# Patient Record
Sex: FEMALE | Race: WHITE | NOT HISPANIC OR LATINO | Employment: FULL TIME | URBAN - METROPOLITAN AREA
[De-identification: names, ages, dates, MRNs, and addresses within clinical notes are randomized per-mention and may not be internally consistent; named-entity substitution may affect disease eponyms.]

---

## 2017-08-08 ENCOUNTER — ALLSCRIPTS OFFICE VISIT (OUTPATIENT)
Dept: OTHER | Facility: OTHER | Age: 27
End: 2017-08-08

## 2017-08-08 DIAGNOSIS — Z86.19 PERSONAL HISTORY OF OTHER INFECTIOUS AND PARASITIC DISEASES: ICD-10-CM

## 2017-08-08 DIAGNOSIS — E78.00 PURE HYPERCHOLESTEROLEMIA: ICD-10-CM

## 2018-01-14 NOTE — PROGRESS NOTES
Assessment    1  Encounter for preventive health examination (V70 0) (Z00 00)   2  H/O cold sores (V12 09) (Z86 19)   3  Hypercholesterolemia (272 0) (E78 00)    Plan  H/O cold sores    · ValACYclovir HCl - 500 MG Oral Tablet; take 1 tablet daily prn   · (1) BASIC METABOLIC PROFILE; Status:Active; Requested for:18Pgx3912;    · (1) HEPATIC FUNCTION PANEL; Status:Active; Requested for:94Zzu9862;    · Follow-up PRN Evaluation and Treatment  Follow-up  Status: Complete  Done:  13OZA0281  Hypercholesterolemia    · (1) LIPID PANEL, FASTING; Status:Active; Requested for:81Zel0110;     Discussion/Summary    Reviewed labs  Results on line, call if any questions  The patient was counseled regarding instructions for management, risk factor reductions  Educational resources provided: None  Possible side effects of new medications were reviewed with the patient/guardian today  The treatment plan was reviewed with the patient/guardian  The patient/guardian understands and agrees with the treatment plan     Self Referrals: No      Chief Complaint  pt her today for yearly PE and Rx refill      History of Present Illness  HPI: Follow up  Using Inhalers infrequently, Valacyclovir when in the sun, summer, not much use in fall or winter  Review of Systems    Constitutional: No fever, no chills, feels well, no tiredness, no recent weight gain or weight loss  Eyes: No complaints of eye pain, no red eyes, no eyesight problems, no discharge, no dry eyes, no itching of eyes  ENT: no complaints of earache, no loss of hearing, no nose bleeds, no nasal discharge, no sore throat, no hoarseness  Cardiovascular: No complaints of slow heart rate, no fast heart rate, no chest pain, no palpitations, no leg claudication, no lower extremity edema  Respiratory: No complaints of shortness of breath, no wheezing, no cough, no SOB on exertion, no orthopnea, no PND     Gastrointestinal: No complaints of abdominal pain, no constipation, no nausea or vomiting, no diarrhea, no bloody stools  Genitourinary: No complaints of dysuria, no incontinence, no pelvic pain, no dysmenorrhea, no vaginal discharge or bleeding  Musculoskeletal: No complaints of arthralgias, no myalgias, no joint swelling or stiffness, no limb pain or swelling  Integumentary: No complaints of skin rash or lesions, no itching, no skin wounds, no breast pain or lump  Neurological: No complaints of headache, no confusion, no convulsions, no numbness, no dizziness or fainting, no tingling, no limb weakness, no difficulty walking  Psychiatric: Not suicidal, no sleep disturbance, no anxiety or depression, no change in personality, no emotional problems  Endocrine: No complaints of proptosis, no hot flashes, no muscle weakness, no deepening of the voice, no feelings of weakness  Hematologic/Lymphatic: No complaints of swollen glands, no swollen glands in the neck, does not bleed easily, does not bruise easily  Active Problems    1  Asthma (493 90) (J45 909)   2  Enlarged tonsils (474 11) (J35 1)   3  Exercise-induced asthma (493 81) (J45 990)   4  Fatigue (780 79) (R53 83)   5  H/O cold sores (V12 09) (Z86 19)   6  Herpes simplex type 1 infection (054 9) (B00 9)   7  Hypercholesterolemia (272 0) (E78 00)   8  Hypertension (401 9) (I10)   9  Iron deficiency anemia (280 9) (D50 9)   10  Pap test, as part of routine gynecological examination (V76 2) (Z01 419)   11  Screening for chlamydial disease (V73 98) (Z11 8)   12  Snores (786 09) (R06 83)   13   Vitamin D deficiency (268 9) (E55 9)    Past Medical History    · History of Asthma (493 90) (J45 909)   · History of hypercholesterolemia (V12 29) (Z86 39)    Surgical History    · History of Adenoidectomy   · History of Myringotomy   · History of Oral Surgery Tooth Extraction    Family History  Mother    · Family history of Hypertension (V17 49)  Father    · Family history of No Significant Family History    Social History · Being A Social Drinker   · Caffeine Use   · Daily Coffee Consumption (1  Cups/Day)   · Never A Smoker    Current Meds   1  Advair Diskus 100-50 MCG/DOSE Inhalation Aerosol Powder Breath Activated; INHALE   1 PUFFS Daily before exercise PRN; Therapy: 90QGW6496 to (Evaluate:07Jan2017)  Requested for: 50BTJ0749; Last   Rx:23Sgn5985 Ordered   2  ValACYclovir HCl - 500 MG Oral Tablet; TAKE 1 TABLET BY MOUTH EVERY DAY; Therapy: 37QRE3059 to (Evaluate:05Aug2017)  Requested for: 85KDC4173; Last   Rx:79Ebu0347 Ordered   3  Ventolin  (90 Base) MCG/ACT Inhalation Aerosol Solution; INHALE 1 TO 2   PUFFS EVERY 6 HOURS AS NEEDED; Therapy: 79BTM4526 to (Evaluate:07Jan2017)  Requested for: 15NUP9340; Last   Rx:62Nqg3779 Ordered   4  Zovia 1/35E (28) 1-35 MG-MCG Oral Tablet; TAKE 1 TABLET DAILY; Therapy: 16RKE1122 to Recorded   5  ZyrTEC Allergy 10 MG Oral Capsule; 1 tab daily prn; Therapy: 99RKB3358 to Recorded    Allergies    1  No Known Drug Allergies    2  Seasonal    Vitals   Recorded: 08Aug2017 09:38AM   Temperature 98 2 F, Oral   Heart Rate 80   Systolic 978, LUE   Diastolic 62, LUE   Height 5 ft 5 in   Weight 154 lb    BMI Calculated 25 63   BSA Calculated 1 77   O2 Saturation 98     Physical Exam    Constitutional   General appearance: No acute distress, well appearing and well nourished  Eyes   Conjunctiva and lids: No swelling, erythema or discharge  Pupils and irises: Equal, round and reactive to light  Ears, Nose, Mouth, and Throat   External inspection of ears and nose: Normal     Otoscopic examination: Tympanic membranes translucent with normal light reflex  Canals patent without erythema  Oropharynx: Normal with no erythema, edema, exudate or lesions  Pulmonary   Respiratory effort: No increased work of breathing or signs of respiratory distress  Auscultation of lungs: Clear to auscultation      Cardiovascular   Auscultation of heart: Normal rate and rhythm, normal S1 and S2, without murmurs  Examination of extremities for edema and/or varicosities: Normal     Lymphatic   Palpation of lymph nodes in neck: No lymphadenopathy  Musculoskeletal   Gait and station: Normal     Digits and nails: Normal without clubbing or cyanosis  Skin   Skin and subcutaneous tissue: Normal without rashes or lesions  Neurologic   Reflexes: 2+ and symmetric  Psychiatric   Orientation to person, place, and time: Normal     Mood and affect: Normal        Results/Data  PHQ-2 Adult Depression Screening 60Ebd0123 09:42AM User, Ahs     Test Name Result Flag Reference   PHQ-2 Adult Depression Score 0     Over the last two weeks, how often have you been bothered by any of the following problems? Little interest or pleasure in doing things: Not at all - 0  Feeling down, depressed, or hopeless: Not at all - 0   PHQ-2 Adult Depression Screening Negative         Health Management  Pap test, as part of routine gynecological examination   (every) THINPREP PAP; every 2 years; Last [id number]; Next Due: 95XPQ5521; Overdue  Screening for chlamydial disease   (1) CHLAMYDIA/GC AMPLIFIED DNA, PCR; every 1 year; Next Due: 41Ygg8724;   Overdue    Signatures   Electronically signed by : Agapito Horne DO; Aug  8 2017  1:13PM EST                       (Author)

## 2018-01-15 NOTE — MISCELLANEOUS
Message   Date: 08/15/2016 08:37 AM,   Patient called to say she is scheduled for 08/22/16 and had lab tests done 08/09/16, patient would like to know if any abnormal results you want to discuss or a need too see her again because otherwise, she can cancel appt  17 Aug 2016 12:01 PM   Called patient per Dr Tellez Genera instructions to tell her he said she may cancel her appt since no need to keep appointment  I let her know the lab results show that her cholesterol and triglycerides are a little high and gave her instructions to do moderate exercise, reduce saturated, trans fat, and cholesterol from diet and eat more fish high on Omega 3  Pt stated she understood and will try to change her diet but she does recall her cholesterol being high before  VR/vfp        Active Problems   1  Asthma (493 90) (J45 909)  2  Enlarged tonsils (474 11) (J35 1)  3  Exercise-induced asthma (493 81) (J45 990)  4  Fatigue (780 79) (R53 83)  5  H/O cold sores (V12 09) (Z86 19)  6  Herpes simplex type 1 infection (054 9) (B00 9)  7  Hypercholesterolemia (272 0) (E78 0)  8  Hypertension (401 9) (I10)  9  Iron deficiency anemia (280 9) (D50 9)  10  Pap test, as part of routine gynecological examination (V76 2) (Z01 419)  11  Screening for chlamydial disease (V73 98) (Z11 8)  12  Snores (786 09) (R06 83)  13  Vitamin D deficiency (268 9) (E55 9)    Current Meds  1  Advair Diskus 100-50 MCG/DOSE Inhalation Aerosol Powder Breath Activated; INHALE   1 PUFFS Daily before exercise PRN; Therapy: 56NXA4071 to (Evaluate:07Jan2017)  Requested for: 85DWD5461; Last   Rx:[id number] Ordered  2  ValACYclovir HCl - 500 MG Oral Tablet; TAKE 1 TABLET BY MOUTH EVERY DAY; Therapy: 31FOZ5303 to (Evaluate:07Jan2017)  Requested for: 13VKU0714; Last   Rx:87Kkb5930 Ordered  3  Ventolin  (90 Base) MCG/ACT Inhalation Aerosol Solution; INHALE 1 TO 2   PUFFS EVERY 6 HOURS AS NEEDED;    Therapy: 90YOI7502 to (Evaluate:07Jan2017)  Requested for: 23PLC1765; Last   Rx:02Ssn3550 Ordered  4  Zovia 1/35E (28) 1-35 MG-MCG Oral Tablet; TAKE 1 TABLET DAILY; Therapy: 48UVA4448 to Recorded  5  ZyrTEC Allergy 10 MG Oral Capsule; 1 tab daily prn; Therapy: 25DSQ7588 to Recorded    Allergies   1  No Known Drug Allergies   2   Seasonal    Signatures   Electronically signed by : Wilver Way DO; Aug 23 2016  9:34AM EST                       (Author)

## 2018-01-22 VITALS
DIASTOLIC BLOOD PRESSURE: 62 MMHG | HEART RATE: 80 BPM | OXYGEN SATURATION: 98 % | WEIGHT: 154 LBS | SYSTOLIC BLOOD PRESSURE: 114 MMHG | BODY MASS INDEX: 25.66 KG/M2 | HEIGHT: 65 IN | TEMPERATURE: 98.2 F

## 2018-07-10 ENCOUNTER — OFFICE VISIT (OUTPATIENT)
Dept: FAMILY MEDICINE CLINIC | Facility: CLINIC | Age: 28
End: 2018-07-10
Payer: COMMERCIAL

## 2018-07-10 VITALS
OXYGEN SATURATION: 99 % | HEIGHT: 65 IN | HEART RATE: 74 BPM | SYSTOLIC BLOOD PRESSURE: 106 MMHG | BODY MASS INDEX: 25.96 KG/M2 | WEIGHT: 155.8 LBS | TEMPERATURE: 98.6 F | DIASTOLIC BLOOD PRESSURE: 70 MMHG

## 2018-07-10 DIAGNOSIS — E78.00 HYPERCHOLESTEREMIA: ICD-10-CM

## 2018-07-10 DIAGNOSIS — Z86.19 HX OF COLD SORES: Primary | ICD-10-CM

## 2018-07-10 PROCEDURE — 3008F BODY MASS INDEX DOCD: CPT | Performed by: FAMILY MEDICINE

## 2018-07-10 PROCEDURE — 99213 OFFICE O/P EST LOW 20 MIN: CPT | Performed by: FAMILY MEDICINE

## 2018-07-10 RX ORDER — CEFUROXIME AXETIL 500 MG/1
500 TABLET ORAL 2 TIMES DAILY
Refills: 0 | COMMUNITY
Start: 2018-04-30 | End: 2019-10-09

## 2018-07-10 RX ORDER — SODIUM FLUORIDE 5 MG/G
GEL, DENTIFRICE DENTAL
COMMUNITY
Start: 2016-02-23 | End: 2019-10-09

## 2018-07-10 RX ORDER — VALACYCLOVIR HYDROCHLORIDE 500 MG/1
500 TABLET, FILM COATED ORAL DAILY PRN
Qty: 30 TABLET | Refills: 5 | Status: SHIPPED | OUTPATIENT
Start: 2018-07-10 | End: 2019-07-15 | Stop reason: SDUPTHER

## 2018-07-10 RX ORDER — ETHYNODIOL DIACETATE AND ETHINYL ESTRADIOL 1 MG-35MCG
1 KIT ORAL DAILY
COMMUNITY
Start: 2014-03-12

## 2018-07-10 RX ORDER — ALBUTEROL SULFATE 90 UG/1
2 AEROSOL, METERED RESPIRATORY (INHALATION) EVERY 6 HOURS PRN
COMMUNITY
Start: 2015-05-26 | End: 2020-05-06 | Stop reason: SDUPTHER

## 2018-07-10 RX ORDER — VALACYCLOVIR HYDROCHLORIDE 500 MG/1
1 TABLET, FILM COATED ORAL DAILY PRN
COMMUNITY
Start: 2015-05-26 | End: 2018-07-10 | Stop reason: SDUPTHER

## 2018-07-10 NOTE — PROGRESS NOTES
Assessment/Plan: patient here today for follow up for Rx refills     No problem-specific Assessment & Plan notes found for this encounter  Diagnoses and all orders for this visit:    Hx of cold sores  -     valACYclovir (VALTREX) 500 mg tablet; Take 1 tablet (500 mg total) by mouth daily as needed (as needed) for up to 30 days  -     Basic metabolic panel; Future  -     Hepatic function panel; Future    Hypercholesteremia  -     Lipid panel; Future    Other orders  -     Cetirizine HCl (ZYRTEC ALLERGY) 10 MG CAPS; Take 1 tablet by mouth daily as needed  -     ethynodiol-ethinyl estradiol (ZOVIA 1/35E, 28,) 1-35 MG-MCG per tablet; Take 1 tablet by mouth daily  -     albuterol (VENTOLIN HFA) 90 mcg/act inhaler; Inhale 2 puffs every 6 (six) hours as needed  -     Discontinue: valACYclovir (VALTREX) 500 mg tablet; Take 1 tablet by mouth daily as needed  -     SODIUM FLUORIDE, DENTAL GEL, (PREVIDENT) 1 1 % GEL;   -     cefuroxime (CEFTIN) 500 mg tablet; Take 500 mg by mouth 2 (two) times a day  -     fluticasone-salmeterol (ADVAIR DISKUS) 100-50 mcg/dose inhaler; Inhale Daily          Subjective:      Patient ID: Ness Dill is a 33 y o  female  Follow up  Using Valtrex maybe one 4 days a week during summer if out in sun  Doesn't use much during winter months        The following portions of the patient's history were reviewed and updated as appropriate: allergies, current medications, past family history, past medical history, past social history, past surgical history and problem list     Review of Systems   Constitutional: Negative  HENT: Negative  Eyes: Negative  Respiratory: Negative  Cardiovascular: Negative  Gastrointestinal: Negative  Genitourinary: Negative  Musculoskeletal: Negative  Skin: Negative  Neurological: Negative  Psychiatric/Behavioral: Negative            Objective:      /70 (BP Location: Left arm, Patient Position: Sitting, Cuff Size: Standard) Pulse 74   Temp 98 6 °F (37 °C) (Oral)   Ht 5' 5" (1 651 m)   Wt 70 7 kg (155 lb 12 8 oz)   LMP 06/20/2018   SpO2 99%   BMI 25 93 kg/m²          Physical Exam   Constitutional: She is oriented to person, place, and time  She appears well-developed and well-nourished  HENT:   Head: Normocephalic and atraumatic  Eyes: Conjunctivae are normal  Pupils are equal, round, and reactive to light  Cardiovascular: Normal rate, regular rhythm and normal heart sounds  Pulmonary/Chest: Effort normal and breath sounds normal    Neurological: She is alert and oriented to person, place, and time  She has normal reflexes  Skin: Skin is warm and dry  Psychiatric: She has a normal mood and affect   Her behavior is normal  Judgment and thought content normal

## 2019-07-12 DIAGNOSIS — Z86.19 HX OF COLD SORES: ICD-10-CM

## 2019-07-12 RX ORDER — VALACYCLOVIR HYDROCHLORIDE 500 MG/1
TABLET, FILM COATED ORAL
Qty: 30 TABLET | Refills: 5 | OUTPATIENT
Start: 2019-07-12

## 2019-07-15 DIAGNOSIS — Z86.19 HX OF COLD SORES: ICD-10-CM

## 2019-07-15 RX ORDER — VALACYCLOVIR HYDROCHLORIDE 500 MG/1
500 TABLET, FILM COATED ORAL DAILY PRN
Qty: 30 TABLET | Refills: 0 | Status: SHIPPED | OUTPATIENT
Start: 2019-07-15 | End: 2019-09-30 | Stop reason: SDUPTHER

## 2019-07-15 NOTE — TELEPHONE ENCOUNTER
Patient is overdue for follow up  Patient states she is leaving for vacation on 7/20/19 and will back to schedule yearly pe after vacation

## 2019-09-30 DIAGNOSIS — Z86.19 HX OF COLD SORES: ICD-10-CM

## 2019-09-30 RX ORDER — VALACYCLOVIR HYDROCHLORIDE 500 MG/1
500 TABLET, FILM COATED ORAL DAILY PRN
Qty: 7 TABLET | Refills: 0 | Status: SHIPPED | OUTPATIENT
Start: 2019-09-30 | End: 2019-10-09 | Stop reason: SDUPTHER

## 2019-09-30 NOTE — TELEPHONE ENCOUNTER
Pt celled for Rx refill, pt was told that last OV was 2018 pt scheduled for 10/9/2019 for follow up and will like Rx refills

## 2019-10-09 ENCOUNTER — OFFICE VISIT (OUTPATIENT)
Dept: FAMILY MEDICINE CLINIC | Facility: CLINIC | Age: 29
End: 2019-10-09
Payer: COMMERCIAL

## 2019-10-09 VITALS
BODY MASS INDEX: 27.66 KG/M2 | TEMPERATURE: 97.8 F | HEART RATE: 70 BPM | SYSTOLIC BLOOD PRESSURE: 106 MMHG | OXYGEN SATURATION: 98 % | WEIGHT: 166 LBS | DIASTOLIC BLOOD PRESSURE: 68 MMHG | HEIGHT: 65 IN

## 2019-10-09 DIAGNOSIS — R53.83 TIREDNESS: Primary | ICD-10-CM

## 2019-10-09 DIAGNOSIS — E55.9 VITAMIN D DEFICIENCY: ICD-10-CM

## 2019-10-09 DIAGNOSIS — Z86.19 HX OF COLD SORES: ICD-10-CM

## 2019-10-09 PROCEDURE — 99213 OFFICE O/P EST LOW 20 MIN: CPT | Performed by: FAMILY MEDICINE

## 2019-10-09 PROCEDURE — 3008F BODY MASS INDEX DOCD: CPT | Performed by: FAMILY MEDICINE

## 2019-10-09 RX ORDER — VALACYCLOVIR HYDROCHLORIDE 500 MG/1
500 TABLET, FILM COATED ORAL DAILY PRN
Qty: 30 TABLET | Refills: 5 | Status: SHIPPED | OUTPATIENT
Start: 2019-10-09 | End: 2021-01-27 | Stop reason: SDUPTHER

## 2019-10-09 NOTE — PROGRESS NOTES
Chief Complaint   Patient presents with    Follow-up     Rx refills     Assessment/Plan:  Discussed weight loss, foods to avoid  PHQ-9 Depression Screening    PHQ-9:    Frequency of the following problems over the past two weeks:       Little interest or pleasure in doing things:  0 - not at all  Feeling down, depressed, or hopeless:  0 - not at all  PHQ-2 Score:  0          Diagnoses and all orders for this visit:    Tiredness  -     Basic metabolic panel; Future  -     CBC and Platelet; Future  -     Hepatic function panel; Future  -     TSH, 3rd generation; Future    Hx of cold sores  -     valACYclovir (VALTREX) 500 mg tablet; Take 1 tablet (500 mg total) by mouth daily as needed (as needed)    Vitamin D deficiency  -     Vitamin D 25 hydroxy; Future          Subjective:      Patient ID: Emerita Marie is a 30 y o  female  Follow up refills  Had a nice summer  Tired a lot  Also on the go  Weight up 11 pounds past year  The following portions of the patient's history were reviewed and updated as appropriate: allergies, current medications, past medical history, past social history and problem list     Review of Systems   Constitutional:        Tiredness   HENT: Negative  Eyes: Negative  Respiratory: Negative  Cardiovascular: Negative  Gastrointestinal: Negative  Genitourinary: Negative  Musculoskeletal: Negative  Skin: Negative  Neurological: Negative  Psychiatric/Behavioral: Negative            Objective:      /68 (BP Location: Left arm, Patient Position: Sitting, Cuff Size: Standard)   Pulse 70   Temp 97 8 °F (36 6 °C) (Oral)   Ht 5' 5" (1 651 m)   Wt 75 3 kg (166 lb)   SpO2 98%   BMI 27 62 kg/m²      Current Outpatient Medications:     albuterol (VENTOLIN HFA) 90 mcg/act inhaler, Inhale 2 puffs every 6 (six) hours as needed, Disp: , Rfl:     Cetirizine HCl (ZYRTEC ALLERGY) 10 MG CAPS, Take 1 tablet by mouth daily as needed, Disp: , Rfl:    ethynodiol-ethinyl estradiol ([place] 1/35E, 28,) 1-35 MG-MCG per tablet, Take 1 tablet by mouth daily, Disp: , Rfl:     fluticasone-salmeterol (ADVAIR DISKUS) 100-50 mcg/dose inhaler, Inhale Daily, Disp: , Rfl:     valACYclovir (VALTREX) 500 mg tablet, Take 1 tablet (500 mg total) by mouth daily as needed (as needed) for up to 7 days, Disp: 7 tablet, Rfl: 0     Allergies   Allergen Reactions    Seasonal Ic  [Cholestatin]             Physical Exam   Constitutional: She is oriented to person, place, and time  She appears well-developed and well-nourished  HENT:   Head: Normocephalic and atraumatic  Right Ear: External ear normal    Left Ear: External ear normal    Nose: Nose normal    Mouth/Throat: Oropharynx is clear and moist    Eyes: Pupils are equal, round, and reactive to light  Conjunctivae and EOM are normal    Neck: Normal range of motion  Neck supple  Cardiovascular: Normal rate, regular rhythm, normal heart sounds and intact distal pulses  Pulmonary/Chest: Effort normal and breath sounds normal    Abdominal: Soft  Bowel sounds are normal    Neurological: She is alert and oriented to person, place, and time  She has normal reflexes  Skin: Skin is warm and dry  Psychiatric: She has a normal mood and affect   Her behavior is normal  Judgment and thought content normal

## 2019-10-09 NOTE — PROGRESS NOTES
BMI Counseling: Body mass index is 27 62 kg/m²  The BMI is above normal  Nutrition recommendations include reducing portion sizes, consuming healthier snacks, decreasing soda and/or juice intake, moderation in carbohydrate intake and increasing intake of lean protein

## 2019-10-21 ENCOUNTER — TELEPHONE (OUTPATIENT)
Dept: FAMILY MEDICINE CLINIC | Facility: CLINIC | Age: 29
End: 2019-10-21

## 2019-10-21 ENCOUNTER — APPOINTMENT (OUTPATIENT)
Dept: LAB | Age: 29
End: 2019-10-21
Payer: COMMERCIAL

## 2019-10-21 DIAGNOSIS — E78.00 HYPERCHOLESTEREMIA: Primary | ICD-10-CM

## 2019-10-21 DIAGNOSIS — E55.9 VITAMIN D DEFICIENCY: ICD-10-CM

## 2019-10-21 DIAGNOSIS — R53.83 TIREDNESS: ICD-10-CM

## 2019-10-21 DIAGNOSIS — E78.00 HYPERCHOLESTEREMIA: ICD-10-CM

## 2019-10-21 LAB
25(OH)D3 SERPL-MCNC: 25.9 NG/ML (ref 30–100)
ALBUMIN SERPL BCP-MCNC: 3.6 G/DL (ref 3.5–5)
ALP SERPL-CCNC: 53 U/L (ref 46–116)
ALT SERPL W P-5'-P-CCNC: 20 U/L (ref 12–78)
ANION GAP SERPL CALCULATED.3IONS-SCNC: 3 MMOL/L (ref 4–13)
AST SERPL W P-5'-P-CCNC: 14 U/L (ref 5–45)
BILIRUB DIRECT SERPL-MCNC: 0.11 MG/DL (ref 0–0.2)
BILIRUB SERPL-MCNC: 0.27 MG/DL (ref 0.2–1)
BUN SERPL-MCNC: 9 MG/DL (ref 5–25)
CALCIUM SERPL-MCNC: 9 MG/DL (ref 8.3–10.1)
CHLORIDE SERPL-SCNC: 108 MMOL/L (ref 100–108)
CHOLEST SERPL-MCNC: 221 MG/DL (ref 50–200)
CO2 SERPL-SCNC: 27 MMOL/L (ref 21–32)
CREAT SERPL-MCNC: 0.77 MG/DL (ref 0.6–1.3)
ERYTHROCYTE [DISTWIDTH] IN BLOOD BY AUTOMATED COUNT: 11.7 % (ref 11.6–15.1)
GFR SERPL CREATININE-BSD FRML MDRD: 105 ML/MIN/1.73SQ M
GLUCOSE P FAST SERPL-MCNC: 83 MG/DL (ref 65–99)
HCT VFR BLD AUTO: 40.2 % (ref 34.8–46.1)
HDLC SERPL-MCNC: 63 MG/DL
HGB BLD-MCNC: 13.6 G/DL (ref 11.5–15.4)
LDLC SERPL CALC-MCNC: 135 MG/DL (ref 0–100)
MCH RBC QN AUTO: 31.7 PG (ref 26.8–34.3)
MCHC RBC AUTO-ENTMCNC: 33.8 G/DL (ref 31.4–37.4)
MCV RBC AUTO: 94 FL (ref 82–98)
NONHDLC SERPL-MCNC: 158 MG/DL
PLATELET # BLD AUTO: 236 THOUSANDS/UL (ref 149–390)
PMV BLD AUTO: 11.3 FL (ref 8.9–12.7)
POTASSIUM SERPL-SCNC: 4.2 MMOL/L (ref 3.5–5.3)
PROT SERPL-MCNC: 7.7 G/DL (ref 6.4–8.2)
RBC # BLD AUTO: 4.29 MILLION/UL (ref 3.81–5.12)
SODIUM SERPL-SCNC: 138 MMOL/L (ref 136–145)
TRIGL SERPL-MCNC: 115 MG/DL
TSH SERPL DL<=0.05 MIU/L-ACNC: 1.58 UIU/ML (ref 0.36–3.74)
WBC # BLD AUTO: 7.03 THOUSAND/UL (ref 4.31–10.16)

## 2019-10-21 PROCEDURE — 80048 BASIC METABOLIC PNL TOTAL CA: CPT

## 2019-10-21 PROCEDURE — 82306 VITAMIN D 25 HYDROXY: CPT

## 2019-10-21 PROCEDURE — 85027 COMPLETE CBC AUTOMATED: CPT

## 2019-10-21 PROCEDURE — 80061 LIPID PANEL: CPT

## 2019-10-21 PROCEDURE — 84443 ASSAY THYROID STIM HORMONE: CPT

## 2019-10-21 PROCEDURE — 36415 COLL VENOUS BLD VENIPUNCTURE: CPT

## 2019-10-21 PROCEDURE — 80076 HEPATIC FUNCTION PANEL: CPT

## 2019-10-21 NOTE — TELEPHONE ENCOUNTER
Patient called and asked for lipid panel to be added to her blood work, she is at the lab and is fasting and would like to have that re-checked since she has history of high cholesterol

## 2020-05-06 DIAGNOSIS — J45.909 ASTHMA, UNSPECIFIED ASTHMA SEVERITY, UNSPECIFIED WHETHER COMPLICATED, UNSPECIFIED WHETHER PERSISTENT: Primary | ICD-10-CM

## 2020-05-06 RX ORDER — ALBUTEROL SULFATE 90 UG/1
1 AEROSOL, METERED RESPIRATORY (INHALATION) EVERY 6 HOURS PRN
Qty: 1 INHALER | Refills: 0 | Status: SHIPPED | OUTPATIENT
Start: 2020-05-06

## 2021-01-23 DIAGNOSIS — Z86.19 HX OF COLD SORES: ICD-10-CM

## 2021-01-25 RX ORDER — VALACYCLOVIR HYDROCHLORIDE 500 MG/1
TABLET, FILM COATED ORAL
Qty: 30 TABLET | Refills: 5 | OUTPATIENT
Start: 2021-01-25

## 2021-01-27 DIAGNOSIS — Z86.19 HX OF COLD SORES: ICD-10-CM

## 2021-01-27 RX ORDER — VALACYCLOVIR HYDROCHLORIDE 500 MG/1
500 TABLET, FILM COATED ORAL DAILY PRN
Qty: 30 TABLET | Refills: 0 | Status: SHIPPED | OUTPATIENT
Start: 2021-01-27 | End: 2021-07-13 | Stop reason: SDUPTHER

## 2021-03-31 DIAGNOSIS — Z23 ENCOUNTER FOR IMMUNIZATION: ICD-10-CM

## 2021-07-13 DIAGNOSIS — Z86.19 HX OF COLD SORES: ICD-10-CM

## 2021-07-13 RX ORDER — VALACYCLOVIR HYDROCHLORIDE 500 MG/1
500 TABLET, FILM COATED ORAL DAILY PRN
Qty: 10 TABLET | Refills: 0 | Status: SHIPPED | OUTPATIENT
Start: 2021-07-13 | End: 2021-07-23

## 2022-10-05 ENCOUNTER — TELEPHONE (OUTPATIENT)
Dept: OBGYN CLINIC | Facility: MEDICAL CENTER | Age: 32
End: 2022-10-05

## 2022-10-05 ENCOUNTER — APPOINTMENT (OUTPATIENT)
Dept: LAB | Facility: MEDICAL CENTER | Age: 32
End: 2022-10-05
Payer: COMMERCIAL

## 2022-10-05 DIAGNOSIS — Z32.01 POSITIVE PREGNANCY TEST: ICD-10-CM

## 2022-10-05 DIAGNOSIS — Z32.01 POSITIVE PREGNANCY TEST: Primary | ICD-10-CM

## 2022-10-05 LAB
ABO GROUP BLD: NORMAL
B-HCG SERPL-ACNC: ABNORMAL MIU/ML
BLD GP AB SCN SERPL QL: NEGATIVE
PROGEST SERPL-MCNC: 8.4 NG/ML
RH BLD: POSITIVE
SPECIMEN EXPIRATION DATE: NORMAL

## 2022-10-05 PROCEDURE — 86900 BLOOD TYPING SEROLOGIC ABO: CPT

## 2022-10-05 PROCEDURE — 84144 ASSAY OF PROGESTERONE: CPT

## 2022-10-05 PROCEDURE — 86901 BLOOD TYPING SEROLOGIC RH(D): CPT

## 2022-10-05 PROCEDURE — 86850 RBC ANTIBODY SCREEN: CPT

## 2022-10-05 PROCEDURE — 84702 CHORIONIC GONADOTROPIN TEST: CPT

## 2022-10-05 PROCEDURE — 36415 COLL VENOUS BLD VENIPUNCTURE: CPT

## 2022-10-05 NOTE — TELEPHONE ENCOUNTER
Pt called into office with positive pregnancy test, LMP was 8/16/22  Pt advised to have labs drawn at any time, pt agreed with treatment plan  Lab scripts added to patient's chart

## 2022-10-06 NOTE — TELEPHONE ENCOUNTER
Pt called in with concerns regarding test results  Pt would like to discuss results  Please call pt to follow up

## 2022-10-06 NOTE — TELEPHONE ENCOUNTER
Spoke with patient at this time  Advised her to schedule dating us   Pt requested earliest available apt

## 2022-10-11 PROBLEM — N91.2 AMENORRHEA: Status: ACTIVE | Noted: 2022-10-11

## 2022-10-11 NOTE — PROGRESS NOTES
Pregnancy Confirmation Visit  [facility] [address] [place] [address], [facility]    Assessment/Plan:  33 y o  No obstetric history on file  presenting with missed menses  No LMP recorded  EDC - 5/23/23     @ 8w2d   Sonogram EDC  5/19/23      Final EDC 5/23/22    by    LMP            - Continue/start prenatal vitamin  - We reviewed her current medications and discussed which are safe to continue in pregnancy  - We briefly discussed options for aneuploidy screening, to be discussed further at the prenatal intake  - Schedule prenatal intake with RN and initial prenatal visit; prenatal labs will be ordered during the prenatal intake      Subjective:    CC: Missed period    Madelin Omer is a 33 y o  No obstetric history on file  who presents with missed menses  No LMP recorded  Patient notes that this pregnancy was planned and desired  She was not using contraception at the time of conception  She reports she is certain of her LMP and that she has regular menses  She has has no vaginal bleeding since her LMP  Objective: There were no vitals taken for this visit      Physical Exam:  General: Well appearing, no distress  CV: Regular rate  Respiratory: Unlabored breathing  Abdomen: Soft, nontender  Extremities: Without edema  Mood and Affect: Appropriate    Transvaginal Pelvic Ultrasound  Jane IUP  Yolk sac: Present  Fetal Pole: Present  CRL consistent with EGA 5/26/22  Cardiac activity: Present   bpm  No adnexal masses appreciated

## 2022-10-12 ENCOUNTER — ULTRASOUND (OUTPATIENT)
Dept: OBGYN CLINIC | Facility: MEDICAL CENTER | Age: 32
End: 2022-10-12
Payer: COMMERCIAL

## 2022-10-12 VITALS
DIASTOLIC BLOOD PRESSURE: 68 MMHG | HEIGHT: 65 IN | BODY MASS INDEX: 27.32 KG/M2 | SYSTOLIC BLOOD PRESSURE: 108 MMHG | WEIGHT: 164 LBS

## 2022-10-12 DIAGNOSIS — N91.2 AMENORRHEA: Primary | ICD-10-CM

## 2022-10-12 PROCEDURE — 76817 TRANSVAGINAL US OBSTETRIC: CPT | Performed by: OBSTETRICS & GYNECOLOGY

## 2022-10-12 PROCEDURE — 99214 OFFICE O/P EST MOD 30 MIN: CPT | Performed by: OBSTETRICS & GYNECOLOGY

## 2022-10-12 RX ORDER — MULTIVIT WITH MINERALS/LUTEIN
TABLET ORAL
COMMUNITY
Start: 2022-08-10

## 2022-10-12 RX ORDER — LORATADINE 10 MG/1
10 TABLET ORAL 2 TIMES DAILY
COMMUNITY

## 2022-11-03 ENCOUNTER — INITIAL PRENATAL (OUTPATIENT)
Dept: OBGYN CLINIC | Facility: MEDICAL CENTER | Age: 32
End: 2022-11-03

## 2022-11-03 VITALS
HEIGHT: 65 IN | SYSTOLIC BLOOD PRESSURE: 118 MMHG | WEIGHT: 162 LBS | BODY MASS INDEX: 26.99 KG/M2 | DIASTOLIC BLOOD PRESSURE: 64 MMHG

## 2022-11-03 DIAGNOSIS — Z34.01 ENCOUNTER FOR SUPERVISION OF NORMAL FIRST PREGNANCY IN FIRST TRIMESTER: Primary | ICD-10-CM

## 2022-11-03 NOTE — PROGRESS NOTES
OB History    Para Term  AB Living   1             SAB IAB Ectopic Multiple Live Births                  # Outcome Date GA Lbr Norris/2nd Weight Sex Delivery Anes PTL Lv   1 Current                  * Pt presents for OB intake  *  *Pt's LMP was Patient's last menstrual period was 2022 (exact date)  *Ultrasound date:10/12/2022   8weeks 1days  *Estimated date of delivery: 2023   * confirmed by lmp    *Signs/Symptoms of Pregnancy   *no Constipation    *yes Headaches   *no Cramping  *no Spotting   Diabetes     *no Hx of GDM    *no BMI >35    *no First degree relative with type 2 diabetes     Hypertension-   *no Hx of chronic HTN   *Infection Screening   *no Does the pt have a hx of MRSA? *no History of herpes? *Immunizations:   *yes Discussed influenza vaccine Received:    *yes Discussed TDaP vaccine   *yes COVID Vaccine  Received   ACTIVE MEDICAL/MENTAL HEALTH CONDITIONS  Asthma: *yes- exercise induces   Depression *n   Anxiety*n  Medications*n  *Interview education   *Handouts given:    *[facility] Me support center     *MyChart sign up instructions    *Lab Locations    *[facility]ians List/Choosing Pediatrician Sheet    *[facility]    *Schedule for Prenatal Visits    *Pregnancy Warning Signs Reviewed    *Safe Medications During Pregnancy    *SMA and CF Testing information sheet    *CPT Code Sheet/MFM 13week NT pamphlet    *[employer]      SBIRT Screen:  Depression Screening Follow-up Plan: Patient's depression screening was negative with an [place] score of  4    *[facility]   *yes discussed genetic testing- pt interested   Patient has been informed of basic prenatal advice such as avoiding alcohol, drugs, and smoking  She should remain hydrated and take daily prenatal vitamins   Patient should avoid caffeine, raw sprouts, high mercury fish, undercooked fish, raw eggs, organ meat, unwashed produce, and unpasteurized cheeses, milk, and fruit juice and undercooked meats  She has been informed about toxoplasmosis and to avoid cat feces  *Details that I feel the provider should be aware of:  Tana Moseley was seen for her ob intake at 11w2d  Prenatal panel ordered  PN1 visit scheduled for *11/15/2022  The patient was oriented to our practice and all questions were answered      Interviewed by:  Matthias Urban

## 2022-11-14 PROBLEM — Z3A.13 13 WEEKS GESTATION OF PREGNANCY: Status: ACTIVE | Noted: 2022-10-11

## 2022-11-14 NOTE — PROGRESS NOTES
Prenatal H&P     Denies loss of fluid, vaginal discharge, vaginal bleeding  and abdominal pain  Not feeling fetal movement    Tolerating PNV, Claritin, B6 and Unisom well  Has not needed rescue inhaler  Planning to complete prenatal panel today  Plans for genetic screening appointment today  Planned  pregnancy  Patient has been getting daily headaches  Headaches are described as frontal takes Tylenol once today with good relief  Believes they are related to not drinking coffee  Patient states headaches will improve with caffeine use  Has an aversion to coffee but has been able to tolerate diet soda  OB history:     G1- current     Dating:     LMP - 8/16/22 JOSE 5/23/23     US on 10/12/22 @  8w 4d JOSE 5/20/23  Working JOSE 5/23/23     Surgical history:     Adenoidectomy, dental surgery and myringotomy    Medical History:     Asthma-exercise-induced, high cholesterol, COVID (8/14/2022), cold sores     Social history:     Alcohol/ tobacco/ illicit drug -no alcohol use since positive pregnancy test/denies tobacco or drug use     Denies history of STD/STI     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, family and friends supportive     PCP/ Dental- last PE 8/2022/ last cleaning 6 months ago      SBIRT-screen negative at intake    She denies a hx of recent cough, chills, fever,  STD/STI, denies a personal history  of TB or  Zika virus or close contacts with persons with TB or COVID -23  She denies a family history of inheritable conditions such as physical or intellectual disabilities, birth defects, blood disorders, heart or neural tube defects  She has never had MRSA  She denies recent travel or travel planned in the near future  Patient Plans   - Feeding breastfeeding  - Contraception uncertain  - Aware office delivers at [address] [place] [address]   If < 32 weeks will recommend evaluation at [address]:  - Continue prenatal vitamin daily  -  Genetic screening/ [facility] appointment today   - asthma in pregnancy has rescue inhaler  Encouraged to call office if using rescue inhaler more than 2-3 times per week  -daily headache encouraged good hydration, good eating hygiene  If no improvement can offer Reglan  -nausea/vomiting in pregnancy continue Unisom nightly and B6 12 5 mg 3 times a day  Encouraged small frequent meals  -  Weight gain in pregnancy-Who BMI recommend 15-25lb   Healthy diet and daily exercise reviewed and encouraged  - Unit regimen reviewed visit every 4 weeks until 28 weeks, every 2 weeks until 36 weeks and then weekly until delivery  - Labs - gonorrhea/chlamydia collected  Encouraged to complete prenatal panel  Pap smear UTD  - Vaccines in Pregnancy      - TDAP vaccine after 27 gestational weeks     - Reviewed with patient  Pregnancy with COVID infection is considered  high risk and can have poor outcome including  delivery, more severe infection  therefore  pregnant patients are recommended to get  COVID-19 vaccination  Written information provided  Had vaccine x 2 and booster  Planning to get 2nd booster in December     - influenza October- March had vaccine 22  -  Common discomforts of pregnancy and precautions reviewed  Signs and symptoms to report reviewed  Encouraged social distancing, good hand hygiene, masking, avoiding crowds and written information provided about COVID-19    RTO 4 weeks f/u US, HA & N/V

## 2022-11-14 NOTE — PATIENT INSTRUCTIONS
Medications and Pregnancy  The following list of over-the-counter medications is usually considered safe to take during pregnancy  Take care to not double up on products containing acetaminophen (Tylenol)  Colds/Sore Throat  Robitussin DM - Plain (guaifenesin)  Saline nasal spray  Warm salt water gargle  Cepacol throat lozenges or mouthwash (cetylpyridinium)  Sucrets (hexylresoricinol)  Allergy  AVOID the “D” - or DECONGESTANT  Claritin (loratadine)  Zrytec (cetirizine)  Allerga (fexofenadine)  Headaches / Aches and Pains  Tylenol (acetaminophen)  Do NOT exceed more than 3000 mg of Tylenol in a 24-hour period  Heartburn  Mylanta (aluminum hydroxide/simethicone, magnesium hydroxide)  Maalaox (aluminum magnesium hydroxide, magnesium hydroxide)  Tums (calcium carbonate)  Riopan (magaldrate)  Constipation  Colace (docusate sodium)  Surfak (docusate sodium)  MiraLAX  Glycerin suppositories  Fleets enema (sodium phosphate & sodium biphosphate)  Nausea/Vomiting  Vitamin B6 (pyridoxine) - May take 50 mg at bedtime, 25 mg in the morning, 25 mg in the afternoon  Unisom (doxylamine) - May use for nausea/vomiting - (cut a 25 mg tablet in half)  May cause drowsiness  Sleep  Benadryl (diphenhydramine) - Take 1-2 tablets as needed at bedtime  Unisom (doxylamine) 25 mg tablet - As needed at bedtime  Melatonin 5 mg tablet - As needed at bedtime    Generally the generic form of medicine is usually lower priced than the brand name form of the medicine  Talk to your healthcare provider before you take any medicines  Many medicines may harm your baby if you take them when you are pregnant  Do not take any medicines, vitamins, herbs, or supplements without first talking to your healthcare provider

## 2022-11-15 ENCOUNTER — ROUTINE PRENATAL (OUTPATIENT)
Dept: PERINATAL CARE | Facility: OTHER | Age: 32
End: 2022-11-15

## 2022-11-15 ENCOUNTER — INITIAL PRENATAL (OUTPATIENT)
Dept: OBGYN CLINIC | Facility: CLINIC | Age: 32
End: 2022-11-15

## 2022-11-15 VITALS — SYSTOLIC BLOOD PRESSURE: 118 MMHG | DIASTOLIC BLOOD PRESSURE: 72 MMHG | WEIGHT: 161.6 LBS | BODY MASS INDEX: 26.89 KG/M2

## 2022-11-15 VITALS
BODY MASS INDEX: 27.16 KG/M2 | HEIGHT: 65 IN | HEART RATE: 72 BPM | WEIGHT: 163 LBS | SYSTOLIC BLOOD PRESSURE: 112 MMHG | DIASTOLIC BLOOD PRESSURE: 76 MMHG

## 2022-11-15 DIAGNOSIS — Z11.3 ROUTINE SCREENING FOR STI (SEXUALLY TRANSMITTED INFECTION): ICD-10-CM

## 2022-11-15 DIAGNOSIS — Z13.79 GENETIC SCREENING: ICD-10-CM

## 2022-11-15 DIAGNOSIS — R51.9 PREGNANCY HEADACHE IN FIRST TRIMESTER: Primary | ICD-10-CM

## 2022-11-15 DIAGNOSIS — Z3A.13 13 WEEKS GESTATION OF PREGNANCY: ICD-10-CM

## 2022-11-15 DIAGNOSIS — O26.891 PREGNANCY HEADACHE IN FIRST TRIMESTER: Primary | ICD-10-CM

## 2022-11-15 DIAGNOSIS — Z3A.13 13 WEEKS GESTATION OF PREGNANCY: Primary | ICD-10-CM

## 2022-11-15 DIAGNOSIS — Z36.82 NUCHAL TRANSLUCENCY OF FETUS ON PRENATAL ULTRASOUND: ICD-10-CM

## 2022-11-15 DIAGNOSIS — Z71.85 VACCINE COUNSELING: ICD-10-CM

## 2022-11-15 DIAGNOSIS — J45.990 EXERCISE-INDUCED ASTHMA: ICD-10-CM

## 2022-11-15 DIAGNOSIS — O21.9 NAUSEA AND VOMITING IN PREGNANCY: ICD-10-CM

## 2022-11-15 RX ORDER — DIPHENHYDRAMINE HYDROCHLORIDE 25 MG/1
25 CAPSULE ORAL DAILY
COMMUNITY

## 2022-11-15 NOTE — PROGRESS NOTES
Patient chose to have [facility] Non-invasive Prenatal Screen with fetal sex  Patient given brochure and is aware [facility] will contact patients insurance and coordinate coverage  Patient made aware she will need to respond to text message or e-mail from "[employer] within 2 business days or testing will be run through insurance  Patient informed text message will come from area code  "415"  Provided [employer] # [phone] and web site : [email or web address]  "[place] your test online" card with barcode and test tube ID provided to patient  Reviewed [facility]'s web site states 5-7 business days for results via their portal  [facility] states 7-10 business days for results to be in [facility] [address]  A [employer] message will be sent once we receive results  2 vials of blood drawn from right arm by Danae Wasserman  Patient tolerated blood draw without difficulty  Specimens labeled with patient identifiers (name, date of birth, specimen collection date), order and specimen was verified with patient, packed and sent via [employer]  Copy of lab order scanned to Epic media  Maternal Fetal Medicine will have results in approximately 7-10 business days and will call patient or notify via [address]  Patient aware viewing lab result online will reveal fetal sex if ordered  Patient verbalized understanding of all instructions and no questions at this time

## 2022-11-15 NOTE — LETTER
November 20, 2022     Marleen Zazueta MD  [address]    Patient: Yvette Clayton   YOB: 1990   Date of Visit: 11/15/2022       Dear Dr Sheri Brooks: Thank you for referring Yvette Clayton to me for evaluation  Below are my notes for this consultation  If you have questions, please do not hesitate to call me  I look forward to following your patient along with you  Sincerely,        Abrahan Emerson MD        CC: No Recipients  Abrahan Emerson MD  11/20/2022 11:44 AM  Sign when Signing Visit  A fetal ultrasound was completed  See Ob procedures in Epic for an interpretation and recommendations  Do not hesitate to contact us in [facility] if you have questions  Joannie Spatz Patti Bleacher MD, MSCE  Maternal Fetal Medicine      Jesus Harvey  11/15/2022 11:28 AM  Sign when Signing Visit  Patient chose to have [facility] Non-invasive Prenatal Screen with fetal sex  Patient given brochure and is aware [facility] will contact patients insurance and coordinate coverage  Patient made aware she will need to respond to text message or e-mail from [employer] within 2 business days or testing will be run through insurance  Patient informed text message will come from area code  "415"  Provided [employer] # [phone] and web site : [employer]  "[place] your test online" card with barcode and test tube ID provided to patient  Reviewed [facility]'s web site states 5-7 business days for results via their portal  [facility] states 7-10 business days for results to be in [address] [place] [address] [address]  A [employer] message will be sent once we receive results  2 vials of blood drawn from right arm by Jesus Harvey  Patient tolerated blood draw without difficulty  Specimens labeled with patient identifiers (name, date of birth, specimen collection date), order and specimen was verified with patient, packed and sent via [facility]  Copy of lab order scanned to Epic media      Maternal Fetal Medicine will have results in approximately 7-10 business days and will call patient or notify via [address]  Patient aware viewing lab result online will reveal fetal sex if ordered  Patient verbalized understanding of all instructions and no questions at this time

## 2022-11-15 NOTE — LETTER
November 20, 2022     Zenda Spatz, MD  [address]    Patient: Brittney Garcia   YOB: 1990   Date of Visit: 11/15/2022       Dear Dr Karina Haynes: Thank you for referring Brittney Garcia to me for evaluation  Below are my notes for this consultation  If you have questions, please do not hesitate to call me  I look forward to following your patient along with you  Sincerely,        Amy Beatty MD        CC: No Recipients  Amy Beatty MD  11/20/2022 11:44 AM  Sign when Signing Visit  A fetal ultrasound was completed  See Ob procedures in Epic for an interpretation and recommendations  Do not hesitate to contact us in [facility] if you have questions  Shikha Cardenas MD, MSCE  Maternal Fetal Medicine      Maryjo Ann  11/15/2022 11:28 AM  Sign when Signing Visit  Patient chose to have [facility] Non-invasive Prenatal Screen with fetal sex  Patient given brochure and is aware [facility] will contact patients insurance and coordinate coverage  Patient made aware she will need to respond to text message or e-mail from [employer] within 2 business days or testing will be run through insurance  Patient informed text message will come from area code  "415"  Provided [employer] # [phone] and web site : [email or web address]  "[place] your test online" card with barcode and test tube ID provided to patient  Reviewed [facility]'s web site states 5-7 business days for results via their portal  [facility] states 7-10 business days for results to be in [address] [place] [address] [address]  A [employer] message will be sent once we receive results  2 vials of blood drawn from right arm by Maryjo Ann  Patient tolerated blood draw without difficulty  Specimens labeled with patient identifiers (name, date of birth, specimen collection date), order and specimen was verified with patient, packed and sent via [employer]  Copy of lab order scanned to Epic media      Maternal Fetal Medicine will have results in approximately 7-10 business days and will call patient or notify via [address]  Patient aware viewing lab result online will reveal fetal sex if ordered  Patient verbalized understanding of all instructions and no questions at this time

## 2022-11-16 LAB
C TRACH DNA SPEC QL NAA+PROBE: NEGATIVE
N GONORRHOEA DNA SPEC QL NAA+PROBE: NEGATIVE

## 2022-11-20 PROBLEM — Z71.85 VACCINE COUNSELING: Status: ACTIVE | Noted: 2022-11-20

## 2022-11-20 NOTE — PROGRESS NOTES
A fetal ultrasound was completed  See Ob procedures in Epic for an interpretation and recommendations  Do not hesitate to contact us in [facility] if you have questions  Shikha Cardenas MD, [facility]  Maternal Fetal Medicine

## 2022-11-21 ENCOUNTER — TELEPHONE (OUTPATIENT)
Dept: PERINATAL CARE | Facility: OTHER | Age: 32
End: 2022-11-21

## 2022-11-21 NOTE — TELEPHONE ENCOUNTER
----- Message from Abrahan Emerson MD sent at 11/21/2022 10:07 AM EST -----  I have reviewed the patient's lab results which are normal   Please contact the patient to inform her of the normal results, unless Ms Paulo Pina has already reviewed the results using mychart      Abrahan Emerson

## 2022-11-21 NOTE — TELEPHONE ENCOUNTER
TC to patient  Reported NIPT results screen negative  Patient did not wish to be informed of fetal sex  Patient advised to expect communication about [facility]SAFP  Tana Gant verbalized understanding and denies any questions

## 2022-11-23 ENCOUNTER — LAB (OUTPATIENT)
Dept: LAB | Facility: CLINIC | Age: 32
End: 2022-11-23

## 2022-11-23 DIAGNOSIS — Z34.01 ENCOUNTER FOR SUPERVISION OF NORMAL FIRST PREGNANCY IN FIRST TRIMESTER: ICD-10-CM

## 2022-11-23 LAB
ABO GROUP BLD: NORMAL
AMORPH URATE CRY URNS QL MICRO: ABNORMAL
BACTERIA UR QL AUTO: ABNORMAL /HPF
BASOPHILS # BLD AUTO: 0.04 THOUSANDS/ÂΜL (ref 0–0.1)
BASOPHILS NFR BLD AUTO: 1 % (ref 0–1)
BILIRUB UR QL STRIP: NEGATIVE
BLD GP AB SCN SERPL QL: NEGATIVE
CLARITY UR: ABNORMAL
COLOR UR: YELLOW
EOSINOPHIL # BLD AUTO: 0.04 THOUSAND/ÂΜL (ref 0–0.61)
EOSINOPHIL NFR BLD AUTO: 1 % (ref 0–6)
ERYTHROCYTE [DISTWIDTH] IN BLOOD BY AUTOMATED COUNT: 12.5 % (ref 11.6–15.1)
GLUCOSE UR STRIP-MCNC: NEGATIVE MG/DL
HBV SURFACE AG SER QL: NORMAL
HCT VFR BLD AUTO: 34 % (ref 34.8–46.1)
HGB BLD-MCNC: 11.9 G/DL (ref 11.5–15.4)
HGB UR QL STRIP.AUTO: NEGATIVE
IMM GRANULOCYTES # BLD AUTO: 0.03 THOUSAND/UL (ref 0–0.2)
IMM GRANULOCYTES NFR BLD AUTO: 0 % (ref 0–2)
KETONES UR STRIP-MCNC: NEGATIVE MG/DL
LEUKOCYTE ESTERASE UR QL STRIP: NEGATIVE
LYMPHOCYTES # BLD AUTO: 2.29 THOUSANDS/ÂΜL (ref 0.6–4.47)
LYMPHOCYTES NFR BLD AUTO: 29 % (ref 14–44)
MCH RBC QN AUTO: 31.6 PG (ref 26.8–34.3)
MCHC RBC AUTO-ENTMCNC: 35 G/DL (ref 31.4–37.4)
MCV RBC AUTO: 90 FL (ref 82–98)
MONOCYTES # BLD AUTO: 0.43 THOUSAND/ÂΜL (ref 0.17–1.22)
MONOCYTES NFR BLD AUTO: 5 % (ref 4–12)
MUCOUS THREADS UR QL AUTO: ABNORMAL
NEUTROPHILS # BLD AUTO: 5.15 THOUSANDS/ÂΜL (ref 1.85–7.62)
NEUTS SEG NFR BLD AUTO: 64 % (ref 43–75)
NITRITE UR QL STRIP: NEGATIVE
NON-SQ EPI CELLS URNS QL MICRO: ABNORMAL /HPF
NRBC BLD AUTO-RTO: 0 /100 WBCS
PH UR STRIP.AUTO: 7.5 [PH]
PLATELET # BLD AUTO: 235 THOUSANDS/UL (ref 149–390)
PMV BLD AUTO: 10.7 FL (ref 8.9–12.7)
PROT UR STRIP-MCNC: ABNORMAL MG/DL
RBC # BLD AUTO: 3.76 MILLION/UL (ref 3.81–5.12)
RBC #/AREA URNS AUTO: ABNORMAL /HPF
RH BLD: POSITIVE
RUBV IGG SERPL IA-ACNC: 5.3 IU/ML
SP GR UR STRIP.AUTO: 1.01 (ref 1–1.03)
SPECIMEN EXPIRATION DATE: NORMAL
UROBILINOGEN UR STRIP-ACNC: <2 MG/DL
WBC # BLD AUTO: 7.98 THOUSAND/UL (ref 4.31–10.16)
WBC #/AREA URNS AUTO: ABNORMAL /HPF

## 2022-11-24 LAB
BACTERIA UR CULT: NORMAL
HIV 1+2 AB+HIV1 P24 AG SERPL QL IA: NORMAL
RPR SER QL: NORMAL

## 2022-11-25 ENCOUNTER — TELEPHONE (OUTPATIENT)
Dept: OBGYN CLINIC | Facility: MEDICAL CENTER | Age: 32
End: 2022-11-25

## 2022-11-25 NOTE — RESULT ENCOUNTER NOTE
Please inform patient I reviewed her labs   She is rubella non immune and will need MMR after delivery

## 2022-11-25 NOTE — TELEPHONE ENCOUNTER
----- Message from Ishan Alvarez MD sent at 11/25/2022  4:09 AM EST -----  Please inform patient I reviewed her labs   She is rubella non immune and will need MMR after delivery

## 2022-12-13 ENCOUNTER — ROUTINE PRENATAL (OUTPATIENT)
Dept: OBGYN CLINIC | Facility: MEDICAL CENTER | Age: 32
End: 2022-12-13

## 2022-12-13 VITALS — BODY MASS INDEX: 28.29 KG/M2 | WEIGHT: 170 LBS | SYSTOLIC BLOOD PRESSURE: 122 MMHG | DIASTOLIC BLOOD PRESSURE: 80 MMHG

## 2022-12-13 DIAGNOSIS — Z13.79 GENETIC TESTING: Primary | ICD-10-CM

## 2022-12-13 DIAGNOSIS — Z3A.17 17 WEEKS GESTATION OF PREGNANCY: ICD-10-CM

## 2022-12-13 NOTE — PROGRESS NOTES
Judah Crowley is a 33y o  year old  at 17w0d for routine prenatal visit    + FM, no vaginal bleeding, contractions, or LOF  Complaints: No   Most recent ultrasound and labs reviewed    Has level 2 US scheduled   Aware will need MMR postpartum  Order for AFP given   NIPT - normal female [place] ([facility]

## 2022-12-26 ENCOUNTER — HOSPITAL ENCOUNTER (OUTPATIENT)
Facility: HOSPITAL | Age: 32
End: 2022-12-26
Admitting: OBSTETRICS & GYNECOLOGY

## 2022-12-26 ENCOUNTER — NURSE TRIAGE (OUTPATIENT)
Dept: OTHER | Facility: OTHER | Age: 32
End: 2022-12-26

## 2022-12-26 ENCOUNTER — HOSPITAL ENCOUNTER (OUTPATIENT)
Facility: HOSPITAL | Age: 32
Discharge: HOME | End: 2022-12-26
Attending: OBSTETRICS & GYNECOLOGY | Admitting: OBSTETRICS & GYNECOLOGY

## 2022-12-26 VITALS — DIASTOLIC BLOOD PRESSURE: 73 MMHG | TEMPERATURE: 99 F | SYSTOLIC BLOOD PRESSURE: 126 MMHG | HEART RATE: 77 BPM

## 2022-12-26 DIAGNOSIS — Z3A.18 18 WEEKS GESTATION OF PREGNANCY: Primary | ICD-10-CM

## 2022-12-26 LAB
BACTERIA UR QL AUTO: NORMAL /HPF
BILIRUB UR QL STRIP: NEGATIVE
CLARITY UR: CLEAR
COLOR UR: ABNORMAL
GLUCOSE UR STRIP-MCNC: NEGATIVE MG/DL
HGB UR QL STRIP.AUTO: NEGATIVE
KETONES UR STRIP-MCNC: ABNORMAL MG/DL
LEUKOCYTE ESTERASE UR QL STRIP: ABNORMAL
NITRITE UR QL STRIP: NEGATIVE
NON-SQ EPI CELLS URNS QL MICRO: NORMAL /HPF
PH UR STRIP.AUTO: 5.5 [PH]
PROT UR STRIP-MCNC: NEGATIVE MG/DL
RBC #/AREA URNS AUTO: NORMAL /HPF
SP GR UR STRIP.AUTO: 1.01 (ref 1–1.03)
UROBILINOGEN UR QL STRIP.AUTO: 0.2 E.U./DL
WBC #/AREA URNS AUTO: NORMAL /HPF

## 2022-12-26 RX ORDER — NITROFURANTOIN 25; 75 MG/1; MG/1
100 CAPSULE ORAL 2 TIMES DAILY
Qty: 14 CAPSULE | Refills: 0 | Status: SHIPPED | OUTPATIENT
Start: 2022-12-26 | End: 2022-12-26

## 2022-12-26 RX ORDER — NITROFURANTOIN 25; 75 MG/1; MG/1
100 CAPSULE ORAL 2 TIMES DAILY
Qty: 14 CAPSULE | Refills: 0 | Status: SHIPPED | OUTPATIENT
Start: 2022-12-26 | End: 2023-01-02

## 2022-12-26 NOTE — TELEPHONE ENCOUNTER
Regardin wks pregnant - lower abdomen pain/feeling like passing out  ----- Message from [facility] sent at 2022  8:09 AM EST -----  "I am 19 weeks pregnant  I have been having shooting in my lower abdomen after I have been going to the bathroom for the last 4 or 5 days  It eventually subsides but it is lasting longger and longer  I have been frequently urinating   Last night I feel like a nerve was triggered and I felt like I was going to pass out "

## 2022-12-26 NOTE — PROGRESS NOTES
L&D Triage Note - OB/GYN  Breana Hutchins 33 y o  female MRN: [id number]  Unit/Bed#: L&D 831-35 Encounter: [id number]      ASSESSMENT:    Breana Hutchins is a 33 y o   at 18w6d presenting to triage with complaint of abdominal pain  R/o PTL negative, may be UTI vs round ligament  Treating empirically for UTI with nitrofurantoin, Patient discharged  PLAN:    1) R/o PTL  -SVE cl/th/high  -CL4  13cm  -Tones 145  2) Pelvic pain and back pain  -Pain consistent with UTI  -Treating with nitrofurantoin  -No ovarian cysts on TVUS  3) Continue routine prenatal care  4) Discharge from [facility] triage with  labor precautions    - Reviewed rupture of membranes, false vs true labor, decreased fetal movement, and vaginal bleeding   - Pt to call provider with any concerns and follow up at her next scheduled prenatal appointment    - Case discussed with Dr Marco Antonio Steen:    Breana Hutchins 33 y o   at 18w6d with an Estimated Date of Delivery: 23 presenting to triage with low pelvic pain and back pain for past 6-7 days  Patient has a history of ovarian cysts and was concerned that this may be the source of the pain      Her current obstetrical history is significant for HSV    Her past obstetrical history is significant for none    Contractions: back pain  Leakage of fluid: none  Vaginal Bleeding: none  Fetal movement: not yet    OBJECTIVE:    Vitals:    22 1619   BP: 126/73   Pulse: 77   Temp: 99 °F (37 2 °C)       ROS:  Constitutional: Negative  Respiratory: Negative  Cardiovascular: Negative    Gastrointestinal: Negative    General Physical Exam:  General: in no apparent distress  Cardiovascular: No murmurs  Lungs: non-labored breathing  Abdomen: abdomen is soft without significant tenderness, masses, organomegaly or guarding  Lower extremeties: nontender    Cervical Exam  Speculum: Cervical os is closed, absence of pooling, scant white discharge    SVE: cl/th/high  FHT:     Tones 145  TOCO:   Contraction Frequency (minutes): 0  Contraction Duration (seconds): 0  Contraction Quality: Not applicablequiet    Lab Results   Component Value Date    WBC 7 98 11/23/2022    HGB 11 9 11/23/2022    HCT 34 0 (L) 11/23/2022     11/23/2022     Lab Results   Component Value Date    K 4 2 10/21/2019     10/21/2019    CO2 27 10/21/2019    BUN 9 10/21/2019    CREATININE 0 77 10/21/2019    AST 14 10/21/2019    ALT 20 10/21/2019       KOH/WTMT:     Infection:   - few clue cells    - no hyphae   - no trichomonads present    Membrane status   - neg ferning   - neg nitrazene   - neg pooling     Urine Dip    - pending    Imaging:           TVUS   - Cervical length    - 4 13cm    - 4 14cm    - 4 72cm   -No ovarian cysts      Saniya Modi MD,  OBGYN PGY-1  12/26/2022 5:38 PM

## 2022-12-26 NOTE — TELEPHONE ENCOUNTER
Regardin Weeks pregnanat Believe I have an ovarian cyst pain has gotten more frequent and worst  ----- Message from Nini Paulson sent at 2022  2:58 PM EST -----  '' I'm 19 weeks pregnant believe I have an ovarian cyst, I'm having abdominal pain for about 6-7 days the pain is gotten more frequent and worst ''

## 2022-12-26 NOTE — TELEPHONE ENCOUNTER
Patient calling in stating that she has been having intermittent sharp pain in her suprapubic area that lasts for a few seconds after she urinates or has a BM  She states that she has no burning with passing urine but her urine has been more cloudy than usual and she is urinating more frequently than usual  Patient denies VB, discharge or cramping  She has not been able to feel any fetal movement so far this pregnancy  Encouraged patient to increase fluids, use tylenol and heating pad for pain if needed and if symptoms do not get better to be seen at an UC today  Patient verbalized understanding

## 2022-12-26 NOTE — TELEPHONE ENCOUNTER
Patient called in to report worsening intermittent abdominal pain occurring more frequently and lasting longer  Triaged earlier today and patient went to [facility]; negative for UTI  Using Tylenol and heating pad  Reached out to on call provider  Advised to go to [facility] for evaluation since this is patient's second call in today and pain is worsening  Patient advised and will be there in 15-20 minutes  Charge RN L&D AL advised    Reason for Disposition  • Pregnant or could be pregnant (i e , missed last menstrual period)    Answer Assessment - Initial Assessment Questions  1  LOCATION: "Where does it hurt?"       Pain in lower abdomen with increasing frequency of shooting abdominal pain and problem lifting her left leg increases pain in lower abdomen; pain is the worst after finish voiding or moving bowels and trying to stand up after; would subside in a few minutes, now lasting up to an hour  2  RADIATION: "Does the pain shoot anywhere else?" (e g , chest, back)      Denies  3  ONSET: "When did the pain begin?" (e g , minutes, hours or days ago)       12/20   4  SUDDEN: "Gradual or sudden onset?"      Gradual  5  PATTERN "Does the pain come and go, or is it constant?"     - If constant: "Is it getting better, staying the same, or worsening?"       (Note: Constant means the pain never goes away completely; most serious pain is constant and it progresses)      - If intermittent: "How long does it last?" "Do you have pain now?"      (Note: Intermittent means the pain goes away completely between bouts)      Intermittent; becoming more frequent is the last two days  6   SEVERITY: "How bad is the pain?"  (e g , Scale 1-10; mild, moderate, or severe)    - MILD (1-3): doesn't interfere with normal activities, abdomen soft and not tender to touch     - MODERATE (4-7): interferes with normal activities or awakens from sleep, tender to touch     - SEVERE (8-10): excruciating pain, doubled over, unable to do any normal activities       Moderate to severe  7  RECURRENT SYMPTOM: "Have you ever had this type of stomach pain before?" If Yes, ask: "When was the last time?" and "What happened that time?"       Reminds patient of occurrences with ovarian cysts in the past  8  CAUSE: "What do you think is causing the stomach pain?"      Not sure; ruled out UTI  9  RELIEVING/AGGRAVATING FACTORS: "What makes it better or worse?" (e g , movement, antacids, bowel movement)      Heating pad  10  OTHER SYMPTOMS: "Has there been any vomiting, diarrhea, constipation, or urine problems?"       Ovarian cysts with one rupture in past with hospitalization, and 2 episodes of with non treatment  11   PREGNANCY: "Is there any chance you are pregnant?" "When was your last menstrual period?"        19 weeks; JOSE is 5/23/2023    Protocols used: ABDOMINAL PAIN - [facility] - CHANI HERMAN

## 2022-12-26 NOTE — PROCEDURES
[employer], a  at 18w6d with an JOSE of 2023, by Last Menstrual Period, was seen at [facility] for the following procedure(s): $Procedure Type: US - Transvaginal]                   Ultrasound Other  Cervical Length: 4 13         Ultrasound Probe Disinfection    A transvaginal ultrasound was performed     Prior to use, disinfection was performed with High Level Disinfection Process (Trophon)        Kristi Haas MD  22  5:12 PM

## 2022-12-26 NOTE — TELEPHONE ENCOUNTER
Reason for Disposition  • Pain or burning with passing urine (urination)    Answer Assessment - Initial Assessment Questions  1  LOCATION: "Where does it hurt?"       Lower abd- suprapubic area     2  RADIATION: "Does the pain shoot anywhere else?" (e g , chest, back, shoulder)      No     3  ONSET: "When did the pain begin?" (e g , minutes, hours or days ago)       4-5 days ago     4  ONSET: "Gradual or sudden onset?"    Sudden     5  PATTERN "Does the pain come and go, or has it been constant since it started?"       Comes and goes     6  SEVERITY: "How bad is the pain?" "What does it keep you from doing?"  (e g , Scale 1-10; mild, moderate, or severe)    - MILD (1-3): doesn't interfere with normal activities, abdomen soft and not tender to touch     - MODERATE (4-7): interferes with normal activities or awakens from sleep, tender to touch     - SEVERE (8-10): excruciating pain, doubled over, unable to do any normal activities      Initially to stand up from urinating pain is 7/10 and then subsides after a few seconds     7  RECURRENT SYMPTOM: "Have you ever had this type of stomach pain before?" If Yes, ask: "When was the last time?" and "What happened that time?"       No not like this before     8  CAUSE: "What do you think is causing the stomach pain?"    Unsure     9  RELIEVING/AGGRAVATING FACTORS: "What makes it better or worse?" (e g , antacids, bowel movement, movement)      After BM or urinating gets worse     10  OTHER SYMPTOMS: "Has there been any vaginal bleeding, fever, vomiting, diarrhea, or urine problems?"       Loose stools, denies VB or leaking, no fever       11  JOSE: "What date are you expecting to deliver?"        *No Answer*    Protocols used: PREGNANCY - ABDOMINAL PAIN LESS THAN 20 WEEKS EGA-ADULT-

## 2022-12-27 ENCOUNTER — TELEPHONE (OUTPATIENT)
Dept: OBGYN CLINIC | Facility: MEDICAL CENTER | Age: 32
End: 2022-12-27

## 2022-12-27 NOTE — TELEPHONE ENCOUNTER
Called pt to see if issue had been resolved pt stated that she had already spoken to on call Md ans was doing better

## 2022-12-30 ENCOUNTER — APPOINTMENT (OUTPATIENT)
Dept: LAB | Facility: CLINIC | Age: 32
End: 2022-12-30

## 2023-01-01 LAB
2ND TRIMESTER 4 SCREEN SERPL-IMP: NORMAL
AFP ADJ MOM SERPL: 1
AFP INTERP AMN-IMP: NORMAL
AFP INTERP SERPL-IMP: NORMAL
AFP INTERP SERPL-IMP: NORMAL
AFP SERPL-MCNC: 55.5 NG/ML
AGE AT DELIVERY: 31.6 YR
GA METHOD: NORMAL
GA: 19.4 WEEKS
IDDM PATIENT QL: NO
MULTIPLE PREGNANCY: NO
NEURAL TUBE DEFECT RISK FETUS: NORMAL %

## 2023-01-04 ENCOUNTER — TELEPHONE (OUTPATIENT)
Dept: OBGYN CLINIC | Facility: MEDICAL CENTER | Age: 33
End: 2023-01-04

## 2023-01-04 NOTE — TELEPHONE ENCOUNTER
Pt called in concerns with having a couple episodes since 12/25 pt stated feels like passing out and b/p might be dropping, though checked b/p and seemed okay  Pt stated same feeling happened again today at work and is in the nurse office for now and would like a call back to further discuss the episodes and any recommendation to help in the meantime   Please advise thank you

## 2023-01-05 PROBLEM — Z3A.20 20 WEEKS GESTATION OF PREGNANCY: Status: ACTIVE | Noted: 2022-10-11

## 2023-01-06 ENCOUNTER — APPOINTMENT (OUTPATIENT)
Dept: LAB | Facility: MEDICAL CENTER | Age: 33
End: 2023-01-06

## 2023-01-06 ENCOUNTER — ROUTINE PRENATAL (OUTPATIENT)
Dept: OBGYN CLINIC | Facility: MEDICAL CENTER | Age: 33
End: 2023-01-06

## 2023-01-06 ENCOUNTER — APPOINTMENT (OUTPATIENT)
Dept: URGENT CARE | Facility: MEDICAL CENTER | Age: 33
End: 2023-01-06

## 2023-01-06 VITALS
DIASTOLIC BLOOD PRESSURE: 60 MMHG | SYSTOLIC BLOOD PRESSURE: 110 MMHG | HEIGHT: 65 IN | BODY MASS INDEX: 28.49 KG/M2 | WEIGHT: 171 LBS

## 2023-01-06 DIAGNOSIS — R55 NEAR SYNCOPE: ICD-10-CM

## 2023-01-06 DIAGNOSIS — Z34.92 PRENATAL CARE IN SECOND TRIMESTER: Primary | ICD-10-CM

## 2023-01-06 DIAGNOSIS — Z3A.20 20 WEEKS GESTATION OF PREGNANCY: ICD-10-CM

## 2023-01-06 LAB
ATRIAL RATE: 73 BPM
BASOPHILS # BLD AUTO: 0.05 THOUSANDS/ÂΜL (ref 0–0.1)
BASOPHILS NFR BLD AUTO: 0 % (ref 0–1)
EOSINOPHIL # BLD AUTO: 0.07 THOUSAND/ÂΜL (ref 0–0.61)
EOSINOPHIL NFR BLD AUTO: 1 % (ref 0–6)
ERYTHROCYTE [DISTWIDTH] IN BLOOD BY AUTOMATED COUNT: 12.2 % (ref 11.6–15.1)
HCT VFR BLD AUTO: 31.5 % (ref 34.8–46.1)
HGB BLD-MCNC: 10.7 G/DL (ref 11.5–15.4)
IMM GRANULOCYTES # BLD AUTO: 0.05 THOUSAND/UL (ref 0–0.2)
IMM GRANULOCYTES NFR BLD AUTO: 0 % (ref 0–2)
LYMPHOCYTES # BLD AUTO: 2.51 THOUSANDS/ÂΜL (ref 0.6–4.47)
LYMPHOCYTES NFR BLD AUTO: 21 % (ref 14–44)
MCH RBC QN AUTO: 31.7 PG (ref 26.8–34.3)
MCHC RBC AUTO-ENTMCNC: 34 G/DL (ref 31.4–37.4)
MCV RBC AUTO: 93 FL (ref 82–98)
MONOCYTES # BLD AUTO: 0.53 THOUSAND/ÂΜL (ref 0.17–1.22)
MONOCYTES NFR BLD AUTO: 5 % (ref 4–12)
NEUTROPHILS # BLD AUTO: 8.53 THOUSANDS/ÂΜL (ref 1.85–7.62)
NEUTS SEG NFR BLD AUTO: 73 % (ref 43–75)
NRBC BLD AUTO-RTO: 0 /100 WBCS
P AXIS: -5 DEGREES
PLATELET # BLD AUTO: 241 THOUSANDS/UL (ref 149–390)
PMV BLD AUTO: 10.1 FL (ref 8.9–12.7)
PR INTERVAL: 164 MS
QRS AXIS: 36 DEGREES
QRSD INTERVAL: 92 MS
QT INTERVAL: 400 MS
QTC INTERVAL: 440 MS
RBC # BLD AUTO: 3.38 MILLION/UL (ref 3.81–5.12)
T WAVE AXIS: 18 DEGREES
VENTRICULAR RATE: 73 BPM
WBC # BLD AUTO: 11.74 THOUSAND/UL (ref 4.31–10.16)

## 2023-01-06 NOTE — PROGRESS NOTES
Problem OB visit -  denies loss of fluid, vaginal bleeding or abdominal pain  Has not felt fetal movement  Tolerating prenatal vitamin well  Patient is complaining of 3 isolated episodes of near syncope lasting 5 to 15 minutes  Episodes occurred on ,  &  Patient states it starts by feeling sweaty, foggy and then she starts dry heaving  Eats breakfast about 1 hour after waking has at least 2-3 snacks in between 3 meals a day  Denies feeling jittery, shaky, palpitations, dizziness, visual changes  Has had 2 vasovagal episodes in the past  at school while having a bowel movement and 2018 with ovarian cyst rupture admits to drinking a lot of water throughout the day  BP: 110/60 weight: +11lb  Plan  -Continue prenatal vitamins daily  -Near syncope-encouraged to complete EKG and blood work-TSH, free T4, CBC, CMP, ferritin  Encouraged to continue having a snack in between meals  Encouraged to keep hydrated however not to drink in excess of water can cause sodium deficiency   -Follow-up with  center scheduled for 23  -Common discomforts of pregnancy and precautions reviewed  Signs and symptoms to report reviewed    Written information provided about COVID-19  RTO as scheduled review labs/ EKG

## 2023-01-07 LAB
ALBUMIN SERPL BCP-MCNC: 3 G/DL (ref 3.5–5)
ALP SERPL-CCNC: 71 U/L (ref 46–116)
ALT SERPL W P-5'-P-CCNC: 20 U/L (ref 12–78)
ANION GAP SERPL CALCULATED.3IONS-SCNC: 8 MMOL/L (ref 4–13)
AST SERPL W P-5'-P-CCNC: 15 U/L (ref 5–45)
BILIRUB SERPL-MCNC: 0.19 MG/DL (ref 0.2–1)
BUN SERPL-MCNC: 7 MG/DL (ref 5–25)
CALCIUM ALBUM COR SERPL-MCNC: 9.6 MG/DL (ref 8.3–10.1)
CALCIUM SERPL-MCNC: 8.8 MG/DL (ref 8.3–10.1)
CHLORIDE SERPL-SCNC: 106 MMOL/L (ref 96–108)
CO2 SERPL-SCNC: 23 MMOL/L (ref 21–32)
CREAT SERPL-MCNC: 0.55 MG/DL (ref 0.6–1.3)
FERRITIN SERPL-MCNC: 40 NG/ML (ref 8–388)
GFR SERPL CREATININE-BSD FRML MDRD: 125 ML/MIN/1.73SQ M
GLUCOSE P FAST SERPL-MCNC: 111 MG/DL (ref 65–99)
POTASSIUM SERPL-SCNC: 3.6 MMOL/L (ref 3.5–5.3)
PROT SERPL-MCNC: 6.8 G/DL (ref 6.4–8.4)
SODIUM SERPL-SCNC: 137 MMOL/L (ref 135–147)
T4 FREE SERPL-MCNC: 0.99 NG/DL (ref 0.76–1.46)
TSH SERPL DL<=0.05 MIU/L-ACNC: 1.31 UIU/ML (ref 0.45–4.5)

## 2023-01-09 ENCOUNTER — ROUTINE PRENATAL (OUTPATIENT)
Dept: OBGYN CLINIC | Facility: MEDICAL CENTER | Age: 33
End: 2023-01-09

## 2023-01-09 VITALS — DIASTOLIC BLOOD PRESSURE: 68 MMHG | BODY MASS INDEX: 28.96 KG/M2 | WEIGHT: 174 LBS | SYSTOLIC BLOOD PRESSURE: 105 MMHG

## 2023-01-09 DIAGNOSIS — R94.31 ABNORMAL EKG: Primary | ICD-10-CM

## 2023-01-09 DIAGNOSIS — Z34.02 ENCOUNTER FOR SUPERVISION OF NORMAL FIRST PREGNANCY IN SECOND TRIMESTER: Primary | ICD-10-CM

## 2023-01-09 DIAGNOSIS — R94.31 ELECTROCARDIOGRAM SHOWING T WAVE ABNORMALITIES: ICD-10-CM

## 2023-01-09 DIAGNOSIS — Z3A.20 20 WEEKS GESTATION OF PREGNANCY: ICD-10-CM

## 2023-01-09 DIAGNOSIS — R55 NEAR SYNCOPE: ICD-10-CM

## 2023-01-09 NOTE — PROGRESS NOTES
Routine Prenatal Visit  [facility] [address] [place] [address]    Assessment/Plan:  Shell Kruse is a 33y o  year old  at 23w11d who presents for routine prenatal visit  1  Encounter for supervision of normal first pregnancy in second trimester    2  20 weeks gestation of pregnancy    3  Electrocardiogram showing T wave abnormalities          Subjective:     CC: Prenatal care    Seth Estevez is a 33 y o   female who presents for routine prenatal care at 23w11d  Pregnancy ROS: no leakage of fluid, pelvic pain, or vaginal bleeding  some fetal movement  Patient is follow up after recent visit with Leonor Swartz  Labs are normal, EKG reviewed with possible T wave abnormality  Will refer to cardiology  Has level 2 this week   The following portions of the patient's history were reviewed and updated as appropriate: allergies, current medications, past family history, past medical history, obstetric history, gynecologic history, past social history, past surgical history and problem list       Objective:  /68   Wt 78 9 kg (174 lb)   LMP 2022 (Exact Date)   BMI 28 96 kg/m²   Pregravid Weight/BMI: 72 6 kg (160 lb) (BMI 26 63)  Current Weight: 78 9 kg (174 lb)   Total Weight Gain: 6 35 kg (14 lb)   Pre-Leanne Vitals    Flowsheet Row Most Recent Value   Prenatal Assessment    Fetal Heart Rate 140   Movement Present   Prenatal Vitals    Blood Pressure 105/68   Weight - Scale 78 9 kg (174 lb)   Urine Albumin/Glucose    Dilation/Effacement/Station    Vaginal Drainage    Edema    LLE Edema None   RLE Edema None   Facial Edema None           General: Well appearing, no distress  Respiratory: Unlabored breathing  Cardiovascular: Regular rate  Abdomen: Soft, gravid, nontender, FH 20 5  Fundal Height: Appropriate for gestational age  Extremities: Warm and well perfused  Non tender

## 2023-01-10 ENCOUNTER — TELEPHONE (OUTPATIENT)
Dept: OBGYN CLINIC | Facility: MEDICAL CENTER | Age: 33
End: 2023-01-10

## 2023-01-10 NOTE — TELEPHONE ENCOUNTER
Pt scheduled with cardiology associates for 1/11/2023 at 3:40pm at the [place] location  Pt aware and agreeable   All office info provided

## 2023-01-11 NOTE — PATIENT INSTRUCTIONS
Thank you for choosing us for your  care today  If you have any questions about your ultrasound or care, please do not hesitate to contact us or your primary obstetrician  Some general instructions for your pregnancy are:    Exercise: Aim for 22 minutes per day (150 minutes per week) of regular exercise  Walking is great! Nutrition: Choose healthy sources of calcium, iron, and protein  Learn about Preeclampsia: preeclampsia is a common, serious high blood pressure complication in pregnancy  A blood pressure of 510PBAQ (systolic or top number) or 45LZFO (diastolic or bottom number) is not normal and needs evaluation by your doctor  Aspirin is sometimes prescribed in early pregnancy to prevent preeclampsia in women with risk factors - ask your obstetrician if you should be on this medication  For more resources, visit:  https://mothertobaby org/fact-sheets/low-dose-aspirin/  [employer] com cy  https://www highriskpregnancyinfo org/preeclampsia  If you smoke, try to reduce how many cigarettes you smoke or try to quit completely  Do not vape  Other warning signs to watch out for in pregnancy or postpartum: chest pain, obstructed breathing or shortness of breath, seizures, thoughts of hurting yourself or your baby, bleeding, a painful or swollen leg, fever, or headache (see [facility] POST-BIRTH Warning Signs campaign)  If these happen call 911  Itching is also not normal in pregnancy and if you experience this, especially over your hands and feet, potentially worse at night, notify your doctors

## 2023-01-12 ENCOUNTER — ROUTINE PRENATAL (OUTPATIENT)
Dept: PERINATAL CARE | Facility: CLINIC | Age: 33
End: 2023-01-12

## 2023-01-12 ENCOUNTER — CONSULT (OUTPATIENT)
Dept: CARDIOLOGY CLINIC | Facility: CLINIC | Age: 33
End: 2023-01-12

## 2023-01-12 VITALS
BODY MASS INDEX: 28.79 KG/M2 | DIASTOLIC BLOOD PRESSURE: 68 MMHG | HEART RATE: 93 BPM | SYSTOLIC BLOOD PRESSURE: 114 MMHG | WEIGHT: 172.8 LBS | HEIGHT: 65 IN

## 2023-01-12 VITALS
SYSTOLIC BLOOD PRESSURE: 110 MMHG | WEIGHT: 175 LBS | HEART RATE: 84 BPM | HEIGHT: 65 IN | BODY MASS INDEX: 29.16 KG/M2 | DIASTOLIC BLOOD PRESSURE: 74 MMHG

## 2023-01-12 DIAGNOSIS — Z36.86 ENCOUNTER FOR ANTENATAL SCREENING FOR CERVICAL LENGTH: ICD-10-CM

## 2023-01-12 DIAGNOSIS — R55 NEAR SYNCOPE: ICD-10-CM

## 2023-01-12 DIAGNOSIS — D50.9 IRON DEFICIENCY ANEMIA DURING PREGNANCY: ICD-10-CM

## 2023-01-12 DIAGNOSIS — Z3A.20 20 WEEKS GESTATION OF PREGNANCY: ICD-10-CM

## 2023-01-12 DIAGNOSIS — R94.31 ABNORMAL EKG: ICD-10-CM

## 2023-01-12 DIAGNOSIS — Z36.3 ENCOUNTER FOR ANTENATAL SCREENING FOR MALFORMATIONS: Primary | ICD-10-CM

## 2023-01-12 DIAGNOSIS — O99.019 IRON DEFICIENCY ANEMIA DURING PREGNANCY: ICD-10-CM

## 2023-01-12 RX ORDER — FERROUS SULFATE TAB EC 324 MG (65 MG FE EQUIVALENT) 324 (65 FE) MG
324 TABLET DELAYED RESPONSE ORAL DAILY
Qty: 28 TABLET | Refills: 0 | Status: SHIPPED | OUTPATIENT
Start: 2023-01-12 | End: 2023-02-09

## 2023-01-12 RX ORDER — VALACYCLOVIR HYDROCHLORIDE 1 G/1
1000 TABLET, FILM COATED ORAL 2 TIMES DAILY
COMMUNITY

## 2023-01-12 NOTE — PATIENT INSTRUCTIONS
You were seen today in the Cardiology office for evaluation of an abnormal EKG        Please schedule your echocardiogram      Thank you for choosing [address]  Please call our office or use [employer] with any questions

## 2023-01-12 NOTE — PROGRESS NOTES
[facility]    Name:Wander Barker   DOS: 1/12/2023     Chief Complaint:   Chief Complaint   Patient presents with   • New Patient Visit     21 weeks pregnant   • near syncope   • Abnormal ECG       HISTORY OF PRESENT ILLNESS:    HPI:  Sweta Trinidad is a 33 y o  female  She  has a past medical history of Asthma, Hypercholesterolemia, and Varicella  She is presents to establish care with a cardiologist at the recommendation of her obstetrician for evaluation of an abnormal EKG in the setting of longstanding history of vasovagal near syncope  The patient is presently 21 weeks pregnant, G1, P0  She reports that she was first diagnosed as having vasovagal near syncope in 2013, when she was in college  The event at that time occurred immediately after a bowel movement, and the patient reports feeling sweaty, flushed, dizzy at the time  There is no reported history of syncope with that episode, and the patient was not constipated or overly straining  Subsequent to that episode, she had another episode in 2018 similar preceding symptoms however this episode was associated with true syncope  The patient does not recall undergoing a cardiac work-up at that time  Today, she reports that she has been experiencing an increased frequency of episodic near syncopal symptoms associated with the same situational triggers (bowel movements, sudden changes in position)  Again, she describes these episodes as being associated with sweating, dizziness, feeling "foggy"  Routine lab work at her obstetrician has demonstrated mild anemia, low normal ferritin level, normal thyroid function  She denies chest pain, shortness of breath, diaphoresis, palpitations, orthopnea, edema, claudication, syncope, loss of vision  States that she had a poor appetite in her first trimester with mild nausea, and is feeling better in her second trimester with good appetite and no nausea  Sleeping well at night        Family history: Mother has HTN and HLD, LBBB  LBBB and CAD with stent in maternal materal grandmother  Maternal grandfather with 3V CABG  No family history of cardiac arrhythmias, valvular heart disease, sudden cardiac death, miscarriage, clotting disorders  ROS    ROS: Pertinent positives and negatives as described in History of Present Illness  Remainder of a 14 point review of systems was negative  Allergies   Allergen Reactions   • Seasonal Ic  [Cholestatin]         Current Outpatient Medications on File Prior to Visit   Medication Sig Dispense Refill   • loratadine (CLARITIN) 10 mg tablet Take 10 mg by mouth 2 (two) times a day     • Prenatal Vit-Fe Fumarate-FA (PRENATAL VITAMINS PO) Take by mouth     • valACYclovir (VALTREX) 1,000 mg tablet Take 1,000 mg by mouth 2 (two) times a day PRN       No current facility-administered medications on file prior to visit         Past Medical History:   Diagnosis Date   • Asthma    • Hypercholesterolemia     Last Assessed: [id number]   • Varicella        Past Surgical History:   Procedure Laterality Date   • ADENOIDECTOMY      Last Assessed: [id number]   • DENTAL SURGERY      Last Assessed: [id number]   • MYRINGOTOMY      Last Assessed: [id number]       Family History   Problem Relation Age of Onset   • Hypertension Mother    • Hyperlipidemia Mother    • Heart block Mother    • Hyperlipidemia Father    • Skin cancer Father    • No Known Problems Sister    • Lung cancer Maternal Grandmother    • Heart block Maternal Grandmother    • Diabetes Maternal Grandfather    • Hypertension Maternal Grandfather    • Coronary artery disease Maternal Grandfather    • Hyperlipidemia Maternal Grandfather    • Breast cancer Paternal Grandmother    • Throat cancer Paternal Grandmother    • Alcohol abuse Paternal Grandmother    • Colon cancer Paternal Grandfather    • Liver cancer Paternal Grandfather    • Ovarian cancer Neg Hx        Social History     Socioeconomic History   • Marital status: /Civil Union     Spouse name: Not on file   • Number of children: Not on file   • Years of education: Not on file   • Highest education level: Not on file   Occupational History   • Not on file   Tobacco Use   • Smoking status: Never   • Smokeless tobacco: Never   Vaping Use   • Vaping Use: Never used   Substance and Sexual Activity   • Alcohol use: Not Currently     Comment: Socially   • Drug use: Never   • Sexual activity: Yes     Partners: Male     Birth control/protection: None   Other Topics Concern   • Not on file   Social History Narrative   • Not on file     Social Determinants of Health     Financial Resource Strain: Not on file   Food Insecurity: Not on file   Transportation Needs: Not on file   Physical Activity: Not on file   Stress: Not on file   Social Connections: Not on file   Intimate Partner Violence: Not on file   Housing Stability: Not on file       OBJECTIVE:    /68 (BP Location: Left arm, Patient Position: Sitting, Cuff Size: Standard)   Pulse 93   Ht 5' 5" (1 651 m)   Wt 78 4 kg (172 lb 12 8 oz)   LMP 08/16/2022 (Exact Date)   BMI 28 76 kg/m²      BP Readings from Last 3 Encounters:   01/12/23 114/68   01/09/23 105/68   01/06/23 110/60       Wt Readings from Last 3 Encounters:   01/12/23 78 4 kg (172 lb 12 8 oz)   01/09/23 78 9 kg (174 lb)   01/06/23 77 6 kg (171 lb)         Physical Exam  Vitals reviewed  Constitutional:       General: She is not in acute distress  Appearance: Normal appearance  She is not diaphoretic  HENT:      Head: Normocephalic and atraumatic  Eyes:      Conjunctiva/sclera: Conjunctivae normal    Neck:      Vascular: No carotid bruit or JVD  Cardiovascular:      Rate and Rhythm: Regular rhythm  Tachycardia present  Pulses: Normal pulses  Heart sounds: Normal heart sounds  No murmur heard  No friction rub  No gallop  Pulmonary:      Effort: Pulmonary effort is normal       Breath sounds: Normal breath sounds   No wheezing, rhonchi or rales  Abdominal:      General: Abdomen is flat  Bowel sounds are normal  There is no distension  Palpations: Abdomen is soft  Musculoskeletal:      Right lower leg: No edema  Left lower leg: No edema  Skin:     General: Skin is warm and dry  Neurological:      General: No focal deficit present  Mental Status: She is alert and oriented to person, place, and time  Psychiatric:         Mood and Affect: Mood normal          Behavior: Behavior normal                                                        Cardiac testing:   EKG reviewed personally: Sinus rhythm  Nonspecific T wave abnormality in the anterior leads  Consider persistent juvenile T waves  LABS:  Lab Results   Component Value Date    BUN 7 01/06/2023    CREATININE 0 55 (L) 01/06/2023    CALCIUM 8 8 01/06/2023    K 3 6 01/06/2023    CO2 23 01/06/2023     01/06/2023    ALKPHOS 71 01/06/2023    AST 15 01/06/2023    ALT 20 01/06/2023        Lab Results   Component Value Date    WBC 11 74 (H) 01/06/2023    HGB 10 7 (L) 01/06/2023    HCT 31 5 (L) 01/06/2023    MCV 93 01/06/2023     01/06/2023       Lab Results   Component Value Date    HDL 63 10/21/2019    LDLCALC 135 (H) 10/21/2019    TRIG 115 10/21/2019       No results found for: HGBA1C    No results found for: TSH      ASSESSMENT/PLAN:  Diagnoses and all orders for this visit:    Abnormal EKG  Near syncope  Personally reviewed, interpreted, and compared to her EKG from today's office visit to prior available in the chart  Her EKG today demonstrates sinus rhythm, with anterior T wave inversions  Although this likely represents persistent juvenile T wave changes, further evaluation is required given her history of vasovagal syncope and recurrent near syncopal symptoms    Have recommended a transthoracic echocardiogram   She has no concerning findings to suggest an arrhythmia genic cause, and therefore I have deferred rhythm monitoring after a detailed discussion with the patient regarding the risks and benefits of doing so  If her transthoracic echo is unrevealing and/or she develops tachycardia/palpitations/recurrent syncope, at that time I do believe that a rhythm monitoring would be useful  I did  the patient on red flag signs that would warrant urgent evaluation, including but not limited to chest pain, shortness of breath at rest, resting tachycardia, exertion limiting dyspnea, recurrent syncope  -     Ambulatory Referral to Cardiology  -     POCT ECG  -     Echo complete w/ contrast if indicated; Future    20 weeks gestation of pregnancy  First pregnancy  No personal history of miscarriage  No history of cardiomyopathy  No family history of gestational hypertension, eclampsia, or peripartum cardiomyopathy  -     Ambulatory Referral to Cardiology    Other orders  -     valACYclovir (VALTREX) 1,000 mg tablet;  Take 1,000 mg by mouth 2 (two) times a day PRN Judie Cowden, MD

## 2023-01-12 NOTE — PROGRESS NOTES
[address]: Ms Candance Gamble was seen today for anatomic survey and cervical length screening ultrasound  See ultrasound report under "OB Procedures" tab  The time spent on this established patient on the encounter date included 5 minutes previsit service time reviewing records and precharting, 10 minutes face-to-face service time counseling regarding results and coordinating care, and  5 minutes charting, totalling 20 minutes  Please don't hesitate to contact our office with any concerns or questions    Kristen Medina MD

## 2023-01-12 NOTE — PROGRESS NOTES
Ultrasound Probe Disinfection    A transvaginal ultrasound was performed  Prior to use, disinfection was performed with High Level Disinfection Process (Trophon)  Probe serial number [id number]: [id number] was used        Martín Royal  01/12/23  4:28 PM

## 2023-01-27 ENCOUNTER — HOSPITAL ENCOUNTER (OUTPATIENT)
Dept: NON INVASIVE DIAGNOSTICS | Facility: CLINIC | Age: 33
Discharge: HOME | End: 2023-01-27

## 2023-01-27 VITALS
HEART RATE: 75 BPM | HEIGHT: 65 IN | WEIGHT: 175 LBS | SYSTOLIC BLOOD PRESSURE: 110 MMHG | BODY MASS INDEX: 29.16 KG/M2 | DIASTOLIC BLOOD PRESSURE: 74 MMHG

## 2023-01-27 DIAGNOSIS — R94.31 ABNORMAL EKG: ICD-10-CM

## 2023-01-27 DIAGNOSIS — R55 NEAR SYNCOPE: ICD-10-CM

## 2023-01-27 LAB
AORTIC ROOT: 2.8 CM
APICAL FOUR CHAMBER EJECTION FRACTION: 57 %
ASCENDING AORTA: 3.2 CM
E WAVE DECELERATION TIME: 196 MS
FRACTIONAL SHORTENING: 30 % (ref 28–44)
INTERVENTRICULAR SEPTUM IN DIASTOLE (PARASTERNAL SHORT AXIS VIEW): 0.7 CM
INTERVENTRICULAR SEPTUM: 0.7 CM (ref 0.6–1.1)
LAAS-AP2: 9.9 CM2
LAAS-AP4: 12.7 CM2
LEFT ATRIUM AREA SYSTOLE SINGLE PLANE A4C: 12.8 CM2
LEFT ATRIUM SIZE: 3.6 CM
LEFT INTERNAL DIMENSION IN SYSTOLE: 3.2 CM (ref 2.1–4)
LEFT VENTRICLE DIASTOLIC VOLUME (MOD BIPLANE): 91 ML
LEFT VENTRICLE SYSTOLIC VOLUME (MOD BIPLANE): 33 ML
LEFT VENTRICULAR INTERNAL DIMENSION IN DIASTOLE: 4.6 CM (ref 3.5–6)
LEFT VENTRICULAR POSTERIOR WALL IN END DIASTOLE: 0.7 CM
LEFT VENTRICULAR STROKE VOLUME: 58 ML
LV EF: 63 %
LVSV (TEICH): 58 ML
MV E'TISSUE VEL-SEP: 17 CM/S
MV PEAK A VEL: 0.69 M/S
MV PEAK E VEL: 125 CM/S
MV STENOSIS PRESSURE HALF TIME: 57 MS
MV VALVE AREA P 1/2 METHOD: 3.86 CM2
RIGHT ATRIUM AREA SYSTOLE A4C: 11.2 CM2
RIGHT VENTRICLE ID DIMENSION: 3.3 CM
SL CV LEFT ATRIUM LENGTH A2C: 3.7 CM
SL CV LV EF: 60
SL CV PED ECHO LEFT VENTRICLE DIASTOLIC VOLUME (MOD BIPLANE) 2D: 98 ML
SL CV PED ECHO LEFT VENTRICLE SYSTOLIC VOLUME (MOD BIPLANE) 2D: 40 ML
TR MAX PG: 21 MMHG
TR PEAK VELOCITY: 2.3 M/S
TRICUSPID VALVE PEAK REGURGITATION VELOCITY: 2.26 M/S

## 2023-02-01 ENCOUNTER — PATIENT MESSAGE (OUTPATIENT)
Dept: OBGYN CLINIC | Facility: MEDICAL CENTER | Age: 33
End: 2023-02-01

## 2023-02-01 ENCOUNTER — HOSPITAL ENCOUNTER (EMERGENCY)
Facility: HOSPITAL | Age: 33
Discharge: HOME | End: 2023-02-01
Attending: EMERGENCY MEDICINE

## 2023-02-01 VITALS
SYSTOLIC BLOOD PRESSURE: 125 MMHG | DIASTOLIC BLOOD PRESSURE: 73 MMHG | WEIGHT: 173.72 LBS | BODY MASS INDEX: 28.91 KG/M2 | RESPIRATION RATE: 18 BRPM | TEMPERATURE: 97.5 F | HEART RATE: 87 BPM | OXYGEN SATURATION: 98 %

## 2023-02-01 DIAGNOSIS — R19.7 DIARRHEA: ICD-10-CM

## 2023-02-01 DIAGNOSIS — R82.71 BACTERIA IN URINE: ICD-10-CM

## 2023-02-01 DIAGNOSIS — R11.2 NAUSEA AND VOMITING: Primary | ICD-10-CM

## 2023-02-01 LAB
ALBUMIN SERPL BCP-MCNC: 3.5 G/DL (ref 3.5–5)
ALP SERPL-CCNC: 79 U/L (ref 34–104)
ALT SERPL W P-5'-P-CCNC: 16 U/L (ref 7–52)
ANION GAP SERPL CALCULATED.3IONS-SCNC: 7 MMOL/L (ref 4–13)
AST SERPL W P-5'-P-CCNC: 27 U/L (ref 13–39)
BACTERIA UR QL AUTO: ABNORMAL /HPF
BASOPHILS # BLD AUTO: 0.02 THOUSANDS/ÂΜL (ref 0–0.1)
BASOPHILS NFR BLD AUTO: 0 % (ref 0–1)
BILIRUB DIRECT SERPL-MCNC: 0.05 MG/DL (ref 0–0.2)
BILIRUB SERPL-MCNC: 0.27 MG/DL (ref 0.2–1)
BILIRUB UR QL STRIP: NEGATIVE
BUN SERPL-MCNC: 5 MG/DL (ref 5–25)
CALCIUM SERPL-MCNC: 8.2 MG/DL (ref 8.4–10.2)
CHLORIDE SERPL-SCNC: 105 MMOL/L (ref 96–108)
CLARITY UR: CLEAR
CO2 SERPL-SCNC: 23 MMOL/L (ref 21–32)
COLOR UR: YELLOW
CREAT SERPL-MCNC: 0.55 MG/DL (ref 0.6–1.3)
EOSINOPHIL # BLD AUTO: 0.03 THOUSAND/ÂΜL (ref 0–0.61)
EOSINOPHIL NFR BLD AUTO: 1 % (ref 0–6)
ERYTHROCYTE [DISTWIDTH] IN BLOOD BY AUTOMATED COUNT: 12.4 % (ref 11.6–15.1)
GFR SERPL CREATININE-BSD FRML MDRD: 125 ML/MIN/1.73SQ M
GLUCOSE SERPL-MCNC: 97 MG/DL (ref 65–140)
GLUCOSE UR STRIP-MCNC: NEGATIVE MG/DL
HCT VFR BLD AUTO: 32.8 % (ref 34.8–46.1)
HGB BLD-MCNC: 11.4 G/DL (ref 11.5–15.4)
HGB UR QL STRIP.AUTO: ABNORMAL
IMM GRANULOCYTES # BLD AUTO: 0.02 THOUSAND/UL (ref 0–0.2)
IMM GRANULOCYTES NFR BLD AUTO: 0 % (ref 0–2)
KETONES UR STRIP-MCNC: NEGATIVE MG/DL
LEUKOCYTE ESTERASE UR QL STRIP: ABNORMAL
LIPASE SERPL-CCNC: 18 U/L (ref 11–82)
LYMPHOCYTES # BLD AUTO: 1.2 THOUSANDS/ÂΜL (ref 0.6–4.47)
LYMPHOCYTES NFR BLD AUTO: 20 % (ref 14–44)
MAGNESIUM SERPL-MCNC: 1.8 MG/DL (ref 1.9–2.7)
MCH RBC QN AUTO: 31.7 PG (ref 26.8–34.3)
MCHC RBC AUTO-ENTMCNC: 34.8 G/DL (ref 31.4–37.4)
MCV RBC AUTO: 91 FL (ref 82–98)
MONOCYTES # BLD AUTO: 0.47 THOUSAND/ÂΜL (ref 0.17–1.22)
MONOCYTES NFR BLD AUTO: 8 % (ref 4–12)
NEUTROPHILS # BLD AUTO: 4.14 THOUSANDS/ÂΜL (ref 1.85–7.62)
NEUTS SEG NFR BLD AUTO: 71 % (ref 43–75)
NITRITE UR QL STRIP: NEGATIVE
NON-SQ EPI CELLS URNS QL MICRO: ABNORMAL /HPF
NRBC BLD AUTO-RTO: 0 /100 WBCS
PH UR STRIP.AUTO: 6.5 [PH] (ref 4.5–8)
PLATELET # BLD AUTO: 189 THOUSANDS/UL (ref 149–390)
PMV BLD AUTO: 10.4 FL (ref 8.9–12.7)
POTASSIUM SERPL-SCNC: 3.4 MMOL/L (ref 3.5–5.3)
PROT SERPL-MCNC: 6.9 G/DL (ref 6.4–8.4)
PROT UR STRIP-MCNC: NEGATIVE MG/DL
RBC # BLD AUTO: 3.6 MILLION/UL (ref 3.81–5.12)
RBC #/AREA URNS AUTO: ABNORMAL /HPF
SODIUM SERPL-SCNC: 135 MMOL/L (ref 135–147)
SP GR UR STRIP.AUTO: 1.01 (ref 1–1.03)
UROBILINOGEN UR QL STRIP.AUTO: 0.2 E.U./DL
WBC # BLD AUTO: 5.88 THOUSAND/UL (ref 4.31–10.16)
WBC #/AREA URNS AUTO: ABNORMAL /HPF

## 2023-02-01 RX ORDER — ONDANSETRON 2 MG/ML
4 INJECTION INTRAMUSCULAR; INTRAVENOUS ONCE
Status: COMPLETED | OUTPATIENT
Start: 2023-02-01 | End: 2023-02-01

## 2023-02-01 RX ORDER — NITROFURANTOIN 25; 75 MG/1; MG/1
100 CAPSULE ORAL 2 TIMES DAILY
Qty: 14 CAPSULE | Refills: 0 | Status: SHIPPED | OUTPATIENT
Start: 2023-02-01 | End: 2023-02-08

## 2023-02-01 RX ORDER — NITROFURANTOIN 25; 75 MG/1; MG/1
100 CAPSULE ORAL 2 TIMES DAILY
Qty: 14 CAPSULE | Refills: 0 | Status: SHIPPED | OUTPATIENT
Start: 2023-02-01 | End: 2023-02-01 | Stop reason: SDUPTHER

## 2023-02-01 RX ORDER — NITROFURANTOIN 25; 75 MG/1; MG/1
100 CAPSULE ORAL 2 TIMES DAILY
Qty: 10 CAPSULE | Refills: 0 | Status: SHIPPED | OUTPATIENT
Start: 2023-02-01 | End: 2023-02-01 | Stop reason: SDUPTHER

## 2023-02-01 RX ORDER — ONDANSETRON 4 MG/1
4 TABLET, ORALLY DISINTEGRATING ORAL EVERY 6 HOURS PRN
Qty: 20 TABLET | Refills: 0 | Status: SHIPPED | OUTPATIENT
Start: 2023-02-01

## 2023-02-01 RX ADMIN — ONDANSETRON HYDROCHLORIDE 4 MG: 2 SOLUTION INTRAMUSCULAR; INTRAVENOUS at 15:07

## 2023-02-01 RX ADMIN — SODIUM CHLORIDE 1000 ML: 0.9 INJECTION, SOLUTION INTRAVENOUS at 15:06

## 2023-02-01 NOTE — PATIENT COMMUNICATION
Called patient to ask how she is feeling, patient said she is been having diarrhea, nausea and no appetite  Patient was advise to go to the ED to get IV fluids and evaluation   Patient agreed and will go to ED

## 2023-02-01 NOTE — ED PROVIDER NOTES
History  Chief Complaint   Patient presents with   • Vomiting     Patient reports N/V/D since Sunday, vomiting ended Monday with continued diarrhea  Patient is 24 weeks pregnant and advised to come in by OB     34 YO female, 24 weeks pregnant, presents with nausea, vomiting and diarrhea for the last 3 days  Patient states nausea began first and she did have significant vomiting which has subsided as of two days prior  She did then develop watery diarrhea, multiple episodes  She continues to have nausea with eating food and has had diarrhea, states as soon as she eats or drinks anything  She has been taking B6 and Unisom at home without relief  She has no abdominal pain and has had no vaginal bleeding or loss of fluid, states baby has been very active  Patient has been able to drink fluids but is unsure if she is getting enough hydration, she continues to have very little appetite and decreased PO intake due to nausea with food  Patient works in a school, 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 Taking?    Prenatal Vit-Fe Fumarate-FA (PRENATAL VITAMINS PO) 2/1/2023  Yes Yes   Sig: Take by mouth   ferrous sulfate 324 (65 Fe) mg Not Taking  No No   Sig: Take 1 tablet (324 mg total) by mouth daily for 28 days If on thyroid medication: do not take within 4 hours   Patient not taking: Reported on 2/1/2023   loratadine (CLARITIN) 10 mg tablet 2/1/2023  Yes Yes   Sig: Take 10 mg by mouth 2 (two) times a day   valACYclovir (VALTREX) 1,000 mg tablet Past Week  Yes Yes   Sig: Take 1,000 mg by mouth 2 (two) times a day PRN      Facility-Administered Medications: None       Past Medical History:   Diagnosis Date   • Asthma    • Hypercholesterolemia     Last Assessed: [id number]   • Varicella Past Surgical History:   Procedure Laterality Date   • ADENOIDECTOMY      Last Assessed: [id number]   • DENTAL SURGERY      Last Assessed: [id number]   • MYRINGOTOMY      Last Assessed: [id number]       Family History   Problem Relation Age of Onset   • Hypertension Mother    • Hyperlipidemia Mother    • Heart block Mother    • Hyperlipidemia Father    • Skin cancer Father    • No Known Problems Sister    • Lung cancer Maternal Grandmother    • Heart block Maternal Grandmother    • Diabetes Maternal Grandfather    • Hypertension Maternal Grandfather    • Coronary artery disease Maternal Grandfather    • Hyperlipidemia Maternal Grandfather    • Breast cancer Paternal Grandmother    • Throat cancer Paternal Grandmother    • Alcohol abuse Paternal Grandmother    • Colon cancer Paternal Grandfather    • Liver cancer Paternal Grandfather    • Ovarian cancer Neg Hx      I have reviewed and agree with the history as documented  E-Cigarette/Vaping   • E-Cigarette Use Never User      E-Cigarette/Vaping Substances   • Nicotine No    • THC No    • CBD No    • Flavoring No    • Other No    • Unknown No      Social History     Tobacco Use   • Smoking status: Never   • Smokeless tobacco: Never   Vaping Use   • Vaping Use: Never used   Substance Use Topics   • Alcohol use: Not Currently     Comment: Socially   • Drug use: Never       Review of Systems   Constitutional: Negative for chills, fatigue and fever  HENT: Negative for dental problem  Eyes: Negative for visual disturbance  Respiratory: Negative for shortness of breath  Cardiovascular: Negative for chest pain  Gastrointestinal: Negative for abdominal pain, diarrhea and vomiting  Genitourinary: Negative for dysuria and frequency  Musculoskeletal: Negative for arthralgias  Skin: Negative for rash  Neurological: Negative for dizziness, weakness and light-headedness  Psychiatric/Behavioral: Negative for agitation, behavioral problems and confusion  All other systems reviewed and are negative  Physical Exam  Physical Exam  Vitals and nursing note reviewed  Constitutional:       Appearance: Normal appearance  HENT:      Head: Normocephalic and atraumatic  Eyes:      Extraocular Movements: Extraocular movements intact  Conjunctiva/sclera: Conjunctivae normal    Cardiovascular:      Rate and Rhythm: Normal rate  Pulmonary:      Effort: Pulmonary effort is normal    Abdominal:      General: There is no distension  Musculoskeletal:         General: Normal range of motion  Cervical back: Normal range of motion  Skin:     Findings: No rash  Neurological:      General: No focal deficit present  Mental Status: She is alert  Cranial Nerves: No cranial nerve deficit  Psychiatric:         Mood and Affect: Mood normal          Vital Signs  ED Triage Vitals [02/01/23 1430]   Temperature Pulse Respirations Blood Pressure SpO2   97 5 °F (36 4 °C) 87 18 125/73 98 %      Temp Source Heart Rate Source Patient Position - Orthostatic VS BP Location FiO2 (%)   Oral Monitor Sitting Right arm --      Pain Score       No Pain           Vitals:    02/01/23 1430   BP: 125/73   Pulse: 87   Patient Position - Orthostatic VS: Sitting         Visual Acuity      ED Medications  Medications   sodium chloride 0 9 % bolus 1,000 mL (0 mL Intravenous Stopped 2/1/23 1557)   ondansetron (ZOFRAN) injection 4 mg (4 mg Intravenous Given 2/1/23 1507)       Diagnostic Studies  Results Reviewed     Procedure Component Value Units Date/Time    Urine Microscopic [[id number]]  (Abnormal) Collected: 02/01/23 1602    Lab Status: Final result Specimen: Urine, Clean Catch Updated: 02/01/23 1731     RBC, UA None Seen /hpf      WBC, UA 0-1 /hpf      Epithelial Cells Occasional /hpf      Bacteria, UA Occasional /hpf     Urine culture [[id number]] Collected: 02/01/23 1602    Lab Status:  In process Specimen: Urine, Clean Catch Updated: 02/01/23 1626    Urine Macroscopic, POC [[id number]]  (Abnormal) Collected: 02/01/23 1602    Lab Status: Final result Specimen: Urine Updated: 02/01/23 1604     Color, UA Yellow     Clarity, UA Clear     pH, UA 6 5     Leukocytes, UA Trace     Nitrite, UA Negative     Protein, UA Negative mg/dl      Glucose, UA Negative mg/dl      Ketones, UA Negative mg/dl      Urobilinogen, UA 0 2 E U /dl      Bilirubin, UA Negative     Occult Blood, UA Trace     Specific [place], UA 1 010    Narrative:      CLINITEK RESULT    Basic metabolic panel [[id number]]  (Abnormal) Collected: 02/01/23 1510    Lab Status: Final result Specimen: Blood from Arm, Right Updated: 02/01/23 1553     Sodium 135 mmol/L      Potassium 3 4 mmol/L      Chloride 105 mmol/L      CO2 23 mmol/L      ANION GAP 7 mmol/L      BUN 5 mg/dL      Creatinine 0 55 mg/dL      Glucose 97 mg/dL      Calcium 8 2 mg/dL      eGFR 125 ml/min/1 73sq m     Narrative:      [facility] guidelines for Chronic Kidney Disease (CKD):   •  Stage 1 with normal or high GFR (GFR > 90 mL/min/1 73 square meters)  •  Stage 2 Mild CKD (GFR = 60-89 mL/min/1 73 square meters)  •  Stage 3A Moderate CKD (GFR = 45-59 mL/min/1 73 square meters)  •  Stage 3B Moderate CKD (GFR = 30-44 mL/min/1 73 square meters)  •  Stage 4 Severe CKD (GFR = 15-29 mL/min/1 73 square meters)  •  Stage 5 End Stage CKD (GFR <15 mL/min/1 73 square meters)  Note: GFR calculation is accurate only with a steady state creatinine    Hepatic function panel [[id number]]  (Normal) Collected: 02/01/23 1510    Lab Status: Final result Specimen: Blood from Arm, Right Updated: 02/01/23 1553     Total Bilirubin 0 27 mg/dL      Bilirubin, Direct 0 05 mg/dL      Alkaline Phosphatase 79 U/L      AST 27 U/L      ALT 16 U/L      Total Protein 6 9 g/dL      Albumin 3 5 g/dL     Magnesium [[id number]]  (Abnormal) Collected: 02/01/23 1510    Lab Status: Final result Specimen: Blood from Arm, Right Updated: 02/01/23 1553     Magnesium 1 8 mg/dL     Lipase [[id number]]  (Normal) Collected: 02/01/23 1510    Lab Status: Final result Specimen: Blood from Arm, Right Updated: 02/01/23 1553     Lipase 18 u/L     CBC and differential [[id number]]  (Abnormal) Collected: 02/01/23 1510    Lab Status: Final result Specimen: Blood from Arm, Right Updated: 02/01/23 1527     WBC 5 88 Thousand/uL      RBC 3 60 Million/uL      Hemoglobin 11 4 g/dL      Hematocrit 32 8 %      MCV 91 fL      MCH 31 7 pg      MCHC 34 8 g/dL      RDW 12 4 %      MPV 10 4 fL      Platelets 921 Thousands/uL      nRBC 0 /100 WBCs      Neutrophils Relative 71 %      Immat GRANS % 0 %      Lymphocytes Relative 20 %      Monocytes Relative 8 %      Eosinophils Relative 1 %      Basophils Relative 0 %      Neutrophils Absolute 4 14 Thousands/µL      Immature Grans Absolute 0 02 Thousand/uL      Lymphocytes Absolute 1 20 Thousands/µL      Monocytes Absolute 0 47 Thousand/µL      Eosinophils Absolute 0 03 Thousand/µL      Basophils Absolute 0 02 Thousands/µL                  No orders to display              Procedures  Procedures         ED Course  ED Course as of 02/02/23 0901   Wed Feb 01, 2023   1540 Hemoglobin(!): 11 4  10 7 three weeks prior  SBIRT 22yo+    Flowsheet Row Most Recent Value   SBIRT (25 yo +)    In order to provide better care to our patients, we are screening all of our patients for alcohol and drug use  Would it be okay to ask you these screening questions? Unable to answer at this time Filed at: 02/01/2023 1541                    Medical Decision Making  1  Nausea, Vomiting, diarrhea - Patient with known sick contacts, likely viral  She has no abdominal pain, states no specific concerns regarding the well being of baby though worried due to her own decreased intake  Will check urine for infection, CBC for leukocytosis, metabolic panel for electrolyte abnormalities and dehydration,  LFT's to assess GB dysfunction, lipase for pancreatitis   Will provide IV fluids and give Zofran for nausea  Did perform non-diagnostic transabdominal ultrasound at bedside which displayed a viable fetus with active cardiac activity, fetal movement and what appeared to be sufficient amniotic fluid  No concerns regarding baby at this time  Have recommended patient return for formal work up should she develop abdominal pain, vaginal bleeding or loss of fluid  Bacteria in urine: acute illness or injury  Diarrhea: acute illness or injury  Nausea and vomiting: acute illness or injury  Amount and/or Complexity of Data Reviewed  Independent Historian:      Details: Visitor assists with details  External Data Reviewed: notes  Details: Reviewed notes regarding current pregnancy  Labs: ordered  Decision-making details documented in ED Course  Risk  Prescription drug management  Disposition  Final diagnoses:   Nausea and vomiting   Diarrhea   Bacteria in urine     Time reflects when diagnosis was documented in both MDM as applicable and the Disposition within this note     Time User Action Codes Description Comment    2/1/2023  4:16 PM [address], [address] [R11 2] Nausea and vomiting     2/1/2023  4:17 PM Desmond Barron [R19 7] Diarrhea     2/1/2023  4:17 PM Desmond Barron [R82 71] Bacteria in urine       ED Disposition     ED Disposition   Discharge    Condition   Stable    Date/Time   Wed Feb 1, 2023  4:16 PM    [address] discharge to home/self care                 Follow-up Information    None         Discharge Medication List as of 2/1/2023  4:19 PM      START taking these medications    Details   ondansetron (ZOFRAN-ODT) 4 mg disintegrating tablet Take 1 tablet (4 mg total) by mouth every 6 (six) hours as needed for nausea, Starting Wed 2/1/2023, Normal         CONTINUE these medications which have CHANGED    Details   nitrofurantoin (MACROBID) 100 mg capsule Take 1 capsule (100 mg total) by mouth 2 (two) times a day for 7 days, Starting Wed 2/1/2023, Until Wed 2/8/2023, Normal         CONTINUE these medications which have NOT CHANGED    Details   loratadine (CLARITIN) 10 mg tablet Take 10 mg by mouth 2 (two) times a day, Historical Med      Prenatal Vit-Fe Fumarate-FA (PRENATAL VITAMINS PO) Take by mouth, Historical Med      valACYclovir (VALTREX) 1,000 mg tablet Take 1,000 mg by mouth 2 (two) times a day PRN, Historical Med      ferrous sulfate 324 (65 Fe) mg Take 1 tablet (324 mg total) by mouth daily for 28 days If on thyroid medication: do not take within 4 hours, Starting Thu 1/12/2023, Until Thu 2/9/2023, Normal             No discharge procedures on file      PDMP Review     None          ED Provider  Electronically Signed by           Nicholas Thomas MD  02/02/23 7432

## 2023-02-01 NOTE — Clinical Note
Kim Sharma was seen and treated in our emergency department on 2/1/2023  No restrictions            Diagnosis:     Maricruz Leonardo  may return to work on return date  She may return on this date: 02/03/2023         If you have any questions or concerns, please don't hesitate to call        Antoinette Coley MD    ______________________________           _______________          _______________  Hospital Representative                              Date                                Time

## 2023-02-01 NOTE — DISCHARGE INSTRUCTIONS
Return to the ER if you develop abdominal pain or vaginal bleeding  Take the zofran as needed for nausea, this can be placed under the tongue to dissolve if you are vomiting  Check the urine micro when it results, if there are more than occasional bacteria, start taking the Macrobid twice daily for the next 7 days

## 2023-02-03 LAB
BACTERIA UR CULT: ABNORMAL
BACTERIA UR CULT: ABNORMAL

## 2023-02-08 PROBLEM — Z3A.25 25 WEEKS GESTATION OF PREGNANCY: Status: ACTIVE | Noted: 2022-10-11

## 2023-02-09 ENCOUNTER — ROUTINE PRENATAL (OUTPATIENT)
Dept: OBGYN CLINIC | Facility: MEDICAL CENTER | Age: 33
End: 2023-02-09

## 2023-02-09 VITALS — WEIGHT: 176.4 LBS | SYSTOLIC BLOOD PRESSURE: 126 MMHG | BODY MASS INDEX: 29.35 KG/M2 | DIASTOLIC BLOOD PRESSURE: 76 MMHG

## 2023-02-09 DIAGNOSIS — D50.9 IRON DEFICIENCY ANEMIA DURING PREGNANCY: Primary | ICD-10-CM

## 2023-02-09 DIAGNOSIS — B37.31 VAGINAL YEAST INFECTION: ICD-10-CM

## 2023-02-09 DIAGNOSIS — O99.019 IRON DEFICIENCY ANEMIA DURING PREGNANCY: Primary | ICD-10-CM

## 2023-02-09 DIAGNOSIS — R94.31 ELECTROCARDIOGRAM SHOWING T WAVE ABNORMALITIES: ICD-10-CM

## 2023-02-09 DIAGNOSIS — Z3A.25 25 WEEKS GESTATION OF PREGNANCY: ICD-10-CM

## 2023-02-09 NOTE — ASSESSMENT & PLAN NOTE
- reviewed 2nd trimester precautions  - glucola and instructions give for next visit  - cardiology follow-up 2/24/23- for near syncope x3 and abnormal T wave  - MFM growth follow-up 2/13/23  - next visit 3 weeks

## 2023-02-09 NOTE — PROGRESS NOTES
Routine Prenatal Visit  [facility] [address] [place] [address]    Assessment/Plan:  Cassie Rehman is a 33y o  year old  at 25w2d who presents for routine prenatal visit  1  Iron deficiency anemia during pregnancy    2  Electrocardiogram showing T wave abnormalities    3  25 weeks gestation of pregnancy  Assessment & Plan:  - reviewed 2nd trimester precautions  - glucola and instructions give for next visit  - cardiology follow-up 23- for near syncope x3 and abnormal T wave  - MFM growth follow-up 23  - next visit 3 weeks      4  Vaginal yeast infection  -     miconazole (MONISTAT 7) 100 mg vaginal suppository; Insert 1 suppository (100 mg total) into the vagina daily at bedtime        Subjective:     CC: Prenatal care    Ijeoma Walker is a 33 y o   female who presents for routine prenatal care at 25w2d  Pregnancy ROS: no leakage of fluid, pelvic pain, or vaginal bleeding  Good fetal movement  States that she is having a yellowish/green discharge and some itching, unsure if odor  80-90 oz of water daily   Wet mount positive for yeast      The following portions of the patient's history were reviewed and updated as appropriate: allergies, current medications, past family history, past medical history, obstetric history, gynecologic history, past social history, past surgical history and problem list       Objective:  /76   Wt 80 kg (176 lb 6 4 oz)   LMP 2022 (Exact Date)   BMI 29 35 kg/m²   Pregravid Weight/BMI: 72 6 kg (160 lb) (BMI 26 63)  Current Weight: 80 kg (176 lb 6 4 oz)   Total Weight Gain: 7 439 kg (16 lb 6 4 oz)   Pre- Vitals    Flowsheet Row Most Recent Value   Prenatal Assessment    Fetal Heart Rate 146   Fundal Height (cm) 25 cm   Movement Present   Prenatal Vitals    Blood Pressure 126/76   Weight - Scale 80 kg (176 lb 6 4 oz)   Urine Albumin/Glucose    Dilation/Effacement/Station    Vaginal Drainage    Draining Fluid No   Edema LLE Edema None   RLE Edema None           General: Well appearing, no distress  Respiratory: Unlabored breathing  Cardiovascular: Regular rate  Abdomen: Soft, gravid, nontender  Fundal Height: Appropriate for gestational age  Extremities: Warm and well perfused  Non tender

## 2023-02-13 ENCOUNTER — ULTRASOUND (OUTPATIENT)
Dept: PERINATAL CARE | Facility: OTHER | Age: 33
End: 2023-02-13

## 2023-02-13 VITALS
BODY MASS INDEX: 29.39 KG/M2 | SYSTOLIC BLOOD PRESSURE: 122 MMHG | DIASTOLIC BLOOD PRESSURE: 64 MMHG | WEIGHT: 176.4 LBS | HEIGHT: 65 IN | HEART RATE: 110 BPM

## 2023-02-13 DIAGNOSIS — Z3A.25 25 WEEKS GESTATION OF PREGNANCY: ICD-10-CM

## 2023-02-13 DIAGNOSIS — O36.62X0 LARGE FOR GESTATIONAL AGE FETUS AFFECTING MOTHER, ANTEPARTUM, SECOND TRIMESTER, SINGLE GESTATION: ICD-10-CM

## 2023-02-13 DIAGNOSIS — Z36.89 ENCOUNTER FOR ULTRASOUND TO CHECK FETAL GROWTH: Primary | ICD-10-CM

## 2023-02-13 DIAGNOSIS — Z36.2 ENCOUNTER FOR FOLLOW-UP ULTRASOUND OF FETAL ANATOMY: ICD-10-CM

## 2023-02-13 NOTE — PROGRESS NOTES
[address]: Ms Viral Castellanos was seen today for fetal growth and followup missed anatomy ultrasound  See ultrasound report under "OB Procedures" tab  The time spent on this established patient on the encounter date included 5 minutes previsit service time reviewing records and precharting, 6 minutes face-to-face service time counseling regarding results and coordinating care, and  5 minutes charting, totalling 16 minutes  Please don't hesitate to contact our office with any concerns or questions    -Elizabeth Browning MD

## 2023-02-24 ENCOUNTER — OFFICE VISIT (OUTPATIENT)
Dept: CARDIOLOGY CLINIC | Facility: CLINIC | Age: 33
End: 2023-02-24

## 2023-02-24 VITALS
HEART RATE: 93 BPM | DIASTOLIC BLOOD PRESSURE: 62 MMHG | WEIGHT: 178 LBS | SYSTOLIC BLOOD PRESSURE: 110 MMHG | OXYGEN SATURATION: 99 % | BODY MASS INDEX: 29.62 KG/M2

## 2023-02-24 DIAGNOSIS — R55 NEAR SYNCOPE: Primary | ICD-10-CM

## 2023-02-24 NOTE — PATIENT INSTRUCTIONS
You were seen today in the Cardiology office for follow up evaluation  Please continue your current cardiac medications as prescribed  Thank you for choosing [address]  Please call our office or use "[employer]" with any questions

## 2023-02-24 NOTE — PROGRESS NOTES
[facility]    Name:Wander Barker   DOS: 2/24/2023     Chief Complaint: No chief complaint on file  HISTORY OF PRESENT ILLNESS:      HPI:  Priya Decker is a 33 y o  female  She  has a past medical history of Asthma, Hypercholesterolemia, and Varicella  She presents for follow-up evaluation  Last saw me in the office on 1/12/2023  At that time per my office visit note:  Seen for evaluation of an abnormal EKG in the setting of longstanding history of vasovagal near syncope  The patient is presently 21 weeks pregnant, G1, P0  She reports that she was first diagnosed as having vasovagal near syncope in 2013, when she was in college  The event at that time occurred immediately after a bowel movement, and the patient reports feeling sweaty, flushed, dizzy at the time  There is no reported history of syncope with that episode, and the patient was not constipated or overly straining  Subsequent to that episode, she had another episode in 2018 similar preceding symptoms however this episode was associated with true syncope  The patient does not recall undergoing a cardiac work-up at that time  She reports that she has been experiencing an increased frequency of episodic near syncopal symptoms associated with the same situational triggers (bowel movements, sudden changes in position)  Again, she describes these episodes as being associated with sweating, dizziness, feeling "foggy"  Routine lab work at her obstetrician has demonstrated mild anemia, low normal ferritin level, normal thyroid function  Based upon her presentation at the time, I had referred her for a transthoracic echocardiogram to exclude structural pathology in the setting of longstanding symptoms and EKG findings  Her transthoracic echo was normal, demonstrating only trace MR and trace TR      Today, the patient states that she has had a substantial improvement in symptom burden and has had no near syncopal or true syncopal episodes  Pregnancy is progressing well, and she specifically denies chest pain, shortness of breath, diaphoresis, dizziness, palpitations, syncope  ROS    ROS: Pertinent positives and negatives as described in History of Present Illness  Remainder of a 14 point review of systems was negative  Allergies   Allergen Reactions   • Seasonal Ic  [Cholestatin]         Current Outpatient Medications on File Prior to Visit   Medication Sig Dispense Refill   • loratadine (CLARITIN) 10 mg tablet Take 10 mg by mouth daily     • ondansetron (ZOFRAN-ODT) 4 mg disintegrating tablet Take 1 tablet (4 mg total) by mouth every 6 (six) hours as needed for nausea 20 tablet 0   • Prenatal Vit-Fe Fumarate-FA (PRENATAL VITAMINS PO) Take by mouth     • valACYclovir (VALTREX) 1,000 mg tablet Take 1,000 mg by mouth 2 (two) times a day PRN     • ferrous sulfate 324 (65 Fe) mg Take 1 tablet (324 mg total) by mouth daily for 28 days If on thyroid medication: do not take within 4 hours (Patient not taking: Reported on 2/1/2023) 28 tablet 0   • miconazole (MONISTAT 7) 100 mg vaginal suppository Insert 1 suppository (100 mg total) into the vagina daily at bedtime (Patient not taking: Reported on 2/24/2023) 7 suppository 0     No current facility-administered medications on file prior to visit         Past Medical History:   Diagnosis Date   • Asthma    • Hypercholesterolemia     Last Assessed: [id number]   • Varicella        Past Surgical History:   Procedure Laterality Date   • ADENOIDECTOMY      Last Assessed: [id number]   • DENTAL SURGERY      Last Assessed: [id number]   • MYRINGOTOMY      Last Assessed: [id number]       Family History   Problem Relation Age of Onset   • Hypertension Mother    • Hyperlipidemia Mother    • Heart block Mother    • Hyperlipidemia Father    • Skin cancer Father    • No Known Problems Sister    • Lung cancer Maternal Grandmother    • Heart block Maternal Grandmother    • Diabetes Maternal Grandfather    • Hypertension Maternal Grandfather    • Coronary artery disease Maternal Grandfather    • Hyperlipidemia Maternal Grandfather    • Breast cancer Paternal Grandmother    • Throat cancer Paternal Grandmother    • Alcohol abuse Paternal Grandmother    • Colon cancer Paternal Grandfather    • Liver cancer Paternal Grandfather    • Ovarian cancer Neg Hx        Social History     Socioeconomic History   • Marital status: /Civil Union     Spouse name: Not on file   • Number of children: Not on file   • Years of education: Not on file   • Highest education level: Not on file   Occupational History   • Not on file   Tobacco Use   • Smoking status: Never   • Smokeless tobacco: Never   Vaping Use   • Vaping Use: Never used   Substance and Sexual Activity   • Alcohol use: Not Currently     Comment: Socially   • Drug use: Never   • Sexual activity: Yes     Partners: Male     Birth control/protection: None   Other Topics Concern   • Not on file   Social History Narrative   • Not on file     Social Determinants of Health     Financial Resource Strain: Not on file   Food Insecurity: Not on file   Transportation Needs: Not on file   Physical Activity: Not on file   Stress: Not on file   Social Connections: Not on file   Intimate Partner Violence: Not on file   Housing Stability: Not on file       OBJECTIVE:    /62 (BP Location: Right arm, Patient Position: Sitting, Cuff Size: Standard)   Pulse 93   Wt 80 7 kg (178 lb)   LMP 08/16/2022 (Exact Date)   SpO2 99%   BMI 29 62 kg/m²      BP Readings from Last 3 Encounters:   02/24/23 110/62   02/13/23 122/64   02/09/23 126/76       Wt Readings from Last 3 Encounters:   02/24/23 80 7 kg (178 lb)   02/13/23 80 kg (176 lb 6 4 oz)   02/09/23 80 kg (176 lb 6 4 oz)       Limited physical exam due to: result review visit  Physical Exam  Constitutional:       General: She is not in acute distress  Appearance: Normal appearance  She is not diaphoretic  HENT:      Head: Normocephalic and atraumatic  Pulmonary:      Effort: Pulmonary effort is normal  No respiratory distress  Neurological:      General: No focal deficit present  Mental Status: She is alert and oriented to person, place, and time  Psychiatric:         Mood and Affect: Mood normal          Behavior: Behavior normal                                                        Cardiac testing:     ECHO 1/27/23:  Left Ventricle Left ventricular cavity size is normal  Wall thickness is normal  The left ventricular ejection fraction is 60%  Systolic function is normal   Wall motion is normal  Diastolic function is normal    Right Ventricle Right ventricular cavity size is normal  Systolic function is normal  Wall thickness is normal    Left Atrium The atrium is normal in size  Right Atrium The atrium is normal in size  Aortic Valve The aortic valve is trileaflet  The leaflets are not thickened  The leaflets are not calcified  The leaflets exhibit normal mobility  There is no evidence of regurgitation  The aortic valve has no significant stenosis  Mitral Valve Mitral valve structure is normal  There is trace regurgitation  There is no evidence of stenosis  Tricuspid Valve Tricuspid valve structure is normal  There is trace regurgitation  There is no evidence of stenosis  The right ventricular systolic pressure is normal    Pulmonic Valve Pulmonic valve structure is normal  There is no evidence of regurgitation  There is no evidence of stenosis  Ascending Aorta The aortic root is normal in size  IVC/SVC The inferior vena cava is normal in size  Pericardium There is no pericardial effusion  The pericardium is normal in appearance           LABS:  Lab Results   Component Value Date    BUN 5 02/01/2023    CREATININE 0 55 (L) 02/01/2023    CALCIUM 8 2 (L) 02/01/2023    K 3 4 (L) 02/01/2023    CO2 23 02/01/2023     02/01/2023    ALKPHOS 79 02/01/2023    AST 27 02/01/2023    ALT 16 02/01/2023        Lab Results   Component Value Date    WBC 5 88 02/01/2023    HGB 11 4 (L) 02/01/2023    HCT 32 8 (L) 02/01/2023    MCV 91 02/01/2023     02/01/2023       Lab Results   Component Value Date    HDL 63 10/21/2019    LDLCALC 135 (H) 10/21/2019    TRIG 115 10/21/2019       No results found for: HGBA1C    No results found for: TSH      ASSESSMENT/PLAN:  Diagnoses and all orders for this visit:    Near syncope  Substantially decreased symptom frequency, with no recent recurrence  Transthoracic echo demonstrated no clinically significant structural pathology, reassuring  At this time, I have recommended continued symptom monitoring  There have been no red flag signs to warrant rhythm monitoring at this time, however I did advise the patient that this would be the next step if she has recurrent near or true syncopal episodes  May follow up NADEEN Guajardo MD

## 2023-03-07 ENCOUNTER — ROUTINE PRENATAL (OUTPATIENT)
Dept: OBGYN CLINIC | Facility: MEDICAL CENTER | Age: 33
End: 2023-03-07

## 2023-03-07 VITALS — DIASTOLIC BLOOD PRESSURE: 70 MMHG | SYSTOLIC BLOOD PRESSURE: 110 MMHG

## 2023-03-07 DIAGNOSIS — Z23 NEED FOR TDAP VACCINATION: ICD-10-CM

## 2023-03-07 DIAGNOSIS — Z71.85 VACCINE COUNSELING: ICD-10-CM

## 2023-03-07 DIAGNOSIS — O36.62X0 LARGE FOR GESTATIONAL AGE FETUS AFFECTING MOTHER, ANTEPARTUM, SECOND TRIMESTER, SINGLE GESTATION: ICD-10-CM

## 2023-03-07 DIAGNOSIS — Z3A.29 29 WEEKS GESTATION OF PREGNANCY: Primary | ICD-10-CM

## 2023-03-07 DIAGNOSIS — Z3A.25 25 WEEKS GESTATION OF PREGNANCY: ICD-10-CM

## 2023-03-07 DIAGNOSIS — B00.9 HERPES SIMPLEX TYPE 1 INFECTION: ICD-10-CM

## 2023-03-07 LAB
BASOPHILS # BLD AUTO: 0.05 THOUSANDS/ÂΜL (ref 0–0.1)
BASOPHILS NFR BLD AUTO: 0 % (ref 0–1)
EOSINOPHIL # BLD AUTO: 0.16 THOUSAND/ÂΜL (ref 0–0.61)
EOSINOPHIL NFR BLD AUTO: 1 % (ref 0–6)
ERYTHROCYTE [DISTWIDTH] IN BLOOD BY AUTOMATED COUNT: 12.5 % (ref 11.6–15.1)
GLUCOSE 1H P 50 G GLC PO SERPL-MCNC: 120 MG/DL (ref 40–134)
HCT VFR BLD AUTO: 33.7 % (ref 34.8–46.1)
HGB BLD-MCNC: 11.2 G/DL (ref 11.5–15.4)
IMM GRANULOCYTES # BLD AUTO: 0.05 THOUSAND/UL (ref 0–0.2)
IMM GRANULOCYTES NFR BLD AUTO: 0 % (ref 0–2)
LYMPHOCYTES # BLD AUTO: 2.78 THOUSANDS/ÂΜL (ref 0.6–4.47)
LYMPHOCYTES NFR BLD AUTO: 25 % (ref 14–44)
MCH RBC QN AUTO: 31.5 PG (ref 26.8–34.3)
MCHC RBC AUTO-ENTMCNC: 33.2 G/DL (ref 31.4–37.4)
MCV RBC AUTO: 95 FL (ref 82–98)
MONOCYTES # BLD AUTO: 0.61 THOUSAND/ÂΜL (ref 0.17–1.22)
MONOCYTES NFR BLD AUTO: 5 % (ref 4–12)
NEUTROPHILS # BLD AUTO: 7.7 THOUSANDS/ÂΜL (ref 1.85–7.62)
NEUTS SEG NFR BLD AUTO: 69 % (ref 43–75)
NRBC BLD AUTO-RTO: 0 /100 WBCS
PLATELET # BLD AUTO: 225 THOUSANDS/UL (ref 149–390)
PMV BLD AUTO: 11.1 FL (ref 8.9–12.7)
RBC # BLD AUTO: 3.55 MILLION/UL (ref 3.81–5.12)
WBC # BLD AUTO: 11.35 THOUSAND/UL (ref 4.31–10.16)

## 2023-03-08 LAB
DME PARACHUTE DELIVERY DATE REQUESTED: NORMAL
DME PARACHUTE ITEM DESCRIPTION: NORMAL
DME PARACHUTE ORDER STATUS: NORMAL
DME PARACHUTE SUPPLIER NAME: NORMAL
DME PARACHUTE SUPPLIER PHONE: NORMAL

## 2023-03-08 NOTE — PROGRESS NOTES
Christopher Jaeger is a 33y o  year old  at 28w2d for routine prenatal visit  GTT and CBC done today  RhoGam needed No  Tdap needed Yes Given: Yes  FKC reviewed  28 week booklet and birth plan given today  - consent for delivery not signed (I forgot at todays visit) please sign next visit   Has follow up at [address] as baby measuring bigger   We briefly discussed ACOG recommendations / will be able to better  further along in pregnancy in terms of EFW and plan for delivery   Herpes labialis - no lesion currently , interested in initiation of valtrex at 36 weeks to try to prevent oral herpes   aware recommendation for genital herpes but would feel most comfortable if can avoid an oral lesion

## 2023-03-08 NOTE — PATIENT INSTRUCTIONS
Kick Counts in Pregnancy   WHAT YOU NEED TO KNOW:   Kick counts measure how much your baby is moving in your womb  A kick from your baby can be felt as a twist, turn, swish, roll, or jab  It is common to feel your baby kicking at 26 to 28 weeks of pregnancy  You may feel your baby kick as early as 20 weeks of pregnancy  You may want to start counting at 28 weeks  DISCHARGE INSTRUCTIONS:   Contact your doctor immediately if:   You feel a change in the number of kicks or movements of your baby  You feel fewer than 10 kicks within 2 hours  You have questions or concerns about your baby's movements  Why measure kick counts:  Your baby's movement may provide information about your baby's health  He or she may move less, or not at all, if there are problems  Your baby may move less if he or she is not getting enough oxygen or nutrition from the placenta  Do not smoke while you are pregnant  Smoking decreases the amount of oxygen that gets to your baby  Talk to your healthcare provider if you need help to quit smoking  Tell your healthcare provider as soon as you feel a change in your baby's movements  When to measure kick counts:   Measure kick counts at the same time every day  Measure kick counts when your baby is awake and most active  Your baby may be most active in the evening  How to measure kick counts:  Check that your baby is awake before you measure kick counts  You can wake up your baby by lightly pushing on your belly, walking, or drinking something cold  Your healthcare provider may tell you different ways to measure kick counts  You may be told to do the following:  Use a chart or clock to keep track of the time you start and finish counting  Sit in a chair or lie on your left side  Place your hands on the largest part of your belly  Count until you reach 10 kicks  Write down how much time it takes to count 10 kicks  It may take 30 minutes to 2 hours to count 10 kicks  It should not take more than 2 hours to count 10 kicks  Follow up with your doctor as directed:  Write down your questions so you remember to ask them during your visits  © Copyright Shonna Steinberg 2022 Information is for End User's use only and may not be sold, redistributed or otherwise used for commercial purposes  The above information is an  only  It is not intended as medical advice for individual conditions or treatments  Talk to your doctor, nurse or pharmacist before following any medical regimen to see if it is safe and effective for you

## 2023-03-13 ENCOUNTER — ULTRASOUND (OUTPATIENT)
Dept: PERINATAL CARE | Facility: OTHER | Age: 33
End: 2023-03-13

## 2023-03-13 VITALS
HEART RATE: 78 BPM | DIASTOLIC BLOOD PRESSURE: 80 MMHG | SYSTOLIC BLOOD PRESSURE: 118 MMHG | BODY MASS INDEX: 29.99 KG/M2 | HEIGHT: 65 IN | WEIGHT: 180 LBS

## 2023-03-13 DIAGNOSIS — R00.2 PALPITATIONS: ICD-10-CM

## 2023-03-13 DIAGNOSIS — Z3A.29 29 WEEKS GESTATION OF PREGNANCY: Primary | ICD-10-CM

## 2023-03-13 DIAGNOSIS — O36.62X0 LARGE FOR GESTATIONAL AGE FETUS AFFECTING MOTHER, ANTEPARTUM, SECOND TRIMESTER, SINGLE GESTATION: Primary | ICD-10-CM

## 2023-03-13 DIAGNOSIS — Z36.89 ENCOUNTER FOR ULTRASOUND TO CHECK FETAL GROWTH: ICD-10-CM

## 2023-03-13 DIAGNOSIS — Z3A.29 29 WEEKS GESTATION OF PREGNANCY: ICD-10-CM

## 2023-03-13 NOTE — PROGRESS NOTES
[address]: Ms Aidan Hazel was seen today for fetal growth assessment ultrasound  See ultrasound report under "OB Procedures" tab  MDM:   I  Diagnoses/Problems addressed:  Acute uncomplicated illness/injury: possible macrosomia (ex  GDMA1, [place], UTI)  II  Data: prior US/EFW, glucola  III  Risk of morbidity: Low    Please don't hesitate to contact our office with any concerns or questions    -Reed Fonseca MD

## 2023-03-20 ENCOUNTER — HOSPITAL ENCOUNTER (OUTPATIENT)
Dept: NON INVASIVE DIAGNOSTICS | Facility: HOSPITAL | Age: 33
Discharge: HOME | End: 2023-03-20

## 2023-03-20 DIAGNOSIS — R00.2 PALPITATIONS: ICD-10-CM

## 2023-03-20 DIAGNOSIS — Z3A.29 29 WEEKS GESTATION OF PREGNANCY: ICD-10-CM

## 2023-03-20 PROBLEM — Z3A.31 31 WEEKS GESTATION OF PREGNANCY: Status: ACTIVE | Noted: 2022-10-11

## 2023-03-20 PROBLEM — O36.63X0 EXCESSIVE FETAL GROWTH AFFECTING MANAGEMENT OF PREGNANCY IN THIRD TRIMESTER: Status: ACTIVE | Noted: 2023-02-13

## 2023-03-24 ENCOUNTER — ROUTINE PRENATAL (OUTPATIENT)
Dept: OBGYN CLINIC | Facility: MEDICAL CENTER | Age: 33
End: 2023-03-24

## 2023-03-24 VITALS
BODY MASS INDEX: 30.66 KG/M2 | SYSTOLIC BLOOD PRESSURE: 124 MMHG | WEIGHT: 184 LBS | HEIGHT: 65 IN | DIASTOLIC BLOOD PRESSURE: 78 MMHG

## 2023-03-24 DIAGNOSIS — B00.9 HSV INFECTION: ICD-10-CM

## 2023-03-24 DIAGNOSIS — O99.019 IRON DEFICIENCY ANEMIA DURING PREGNANCY: ICD-10-CM

## 2023-03-24 DIAGNOSIS — Z3A.31 31 WEEKS GESTATION OF PREGNANCY: ICD-10-CM

## 2023-03-24 DIAGNOSIS — O36.63X0 EXCESSIVE FETAL GROWTH AFFECTING MANAGEMENT OF PREGNANCY IN THIRD TRIMESTER, SINGLE OR UNSPECIFIED FETUS: Primary | ICD-10-CM

## 2023-03-24 DIAGNOSIS — D50.9 IRON DEFICIENCY ANEMIA DURING PREGNANCY: ICD-10-CM

## 2023-03-24 RX ORDER — VALACYCLOVIR HYDROCHLORIDE 1 G/1
1000 TABLET, FILM COATED ORAL 2 TIMES DAILY
Qty: 90 TABLET | Refills: 1 | Status: SHIPPED | OUTPATIENT
Start: 2023-03-24 | End: 2023-09-20

## 2023-03-24 NOTE — PROGRESS NOTES
Denies loss of fluid, vaginal bleeding and abdominal pain  Confirms frequent fetal movement  Doing fetal kick counts  Tolerating prenatal vitamin and Valtrex well  Is taking Valtrex for cold sores  Reviewed 28-week labs  Holter monitor results in process  Denies questions or concerns at today's visit   center ultrasound reviewed-3/13/23-Vertex presentation, possible macrosomia, posterior placenta, no placenta previa, MICHELLE-WNL and EFW 1990g/4 pounds 6 ounces (>97%)  Has follow-up growth ultrasound scheduled  BP: 124/78 weight: +24lbs  Plan  -Continue prenatal vitamins daily  - Continue fetal kick counts daily  -Awaiting Holter monitor results  -Follow-up with  center scheduled for 4/10/23 possible macrosomia  -Common discomforts of pregnancy and precautions including  labor reviewed  Signs and symptoms to report reviewed    Written information provided by COVID-19  RTO 2 weeks

## 2023-04-04 ENCOUNTER — ROUTINE PRENATAL (OUTPATIENT)
Dept: OBGYN CLINIC | Facility: MEDICAL CENTER | Age: 33
End: 2023-04-04

## 2023-04-04 VITALS — SYSTOLIC BLOOD PRESSURE: 120 MMHG | BODY MASS INDEX: 30.95 KG/M2 | DIASTOLIC BLOOD PRESSURE: 78 MMHG | WEIGHT: 186 LBS

## 2023-04-04 DIAGNOSIS — O36.63X0 EXCESSIVE FETAL GROWTH AFFECTING MANAGEMENT OF PREGNANCY IN THIRD TRIMESTER, SINGLE OR UNSPECIFIED FETUS: ICD-10-CM

## 2023-04-04 DIAGNOSIS — Z3A.33 33 WEEKS GESTATION OF PREGNANCY: Primary | ICD-10-CM

## 2023-04-04 NOTE — PATIENT INSTRUCTIONS
Kick Counts in Pregnancy   WHAT YOU NEED TO KNOW:   Kick counts measure how much your baby is moving in your womb  A kick from your baby can be felt as a twist, turn, swish, roll, or jab  It is common to feel your baby kicking at 26 to 28 weeks of pregnancy  You may feel your baby kick as early as 20 weeks of pregnancy  You may want to start counting at 28 weeks  DISCHARGE INSTRUCTIONS:   Contact your doctor immediately if:   You feel a change in the number of kicks or movements of your baby  You feel fewer than 10 kicks within 2 hours  You have questions or concerns about your baby's movements  Why measure kick counts:  Your baby's movement may provide information about your baby's health  He or she may move less, or not at all, if there are problems  Your baby may move less if he or she is not getting enough oxygen or nutrition from the placenta  Do not smoke while you are pregnant  Smoking decreases the amount of oxygen that gets to your baby  Talk to your healthcare provider if you need help to quit smoking  Tell your healthcare provider as soon as you feel a change in your baby's movements  When to measure kick counts:   Measure kick counts at the same time every day  Measure kick counts when your baby is awake and most active  Your baby may be most active in the evening  How to measure kick counts:  Check that your baby is awake before you measure kick counts  You can wake up your baby by lightly pushing on your belly, walking, or drinking something cold  Your healthcare provider may tell you different ways to measure kick counts  You may be told to do the following:  Use a chart or clock to keep track of the time you start and finish counting  Sit in a chair or lie on your left side  Place your hands on the largest part of your belly  Count until you reach 10 kicks  Write down how much time it takes to count 10 kicks  It may take 30 minutes to 2 hours to count 10 kicks  It should not take more than 2 hours to count 10 kicks  Follow up with your doctor as directed:  Write down your questions so you remember to ask them during your visits  © Copyright [employer]onson Rangelt 2022 Information is for End User's use only and may not be sold, redistributed or otherwise used for commercial purposes  The above information is an  only  It is not intended as medical advice for individual conditions or treatments  Talk to your doctor, nurse or pharmacist before following any medical regimen to see if it is safe and effective for you

## 2023-04-04 NOTE — PROGRESS NOTES
Tomás Rodas is a 33y o  year old  at 33w0d for routine prenatal visit    + FM, no vaginal bleeding, contractions, or LOF  Complaints: No   Most recent ultrasound and labs reviewed  We reviewed recent holter monitor with no arrhythmias noted    Suspected macrosomia - has follow up at [facility]  Scheduled  4/10   Considering primary  section - we discussed risks and benefits of this vs trial of labor    All questions answered / states will await next scan to [facility] reviewed

## 2023-04-24 ENCOUNTER — TELEPHONE (OUTPATIENT)
Dept: OBGYN CLINIC | Facility: MEDICAL CENTER | Age: 33
End: 2023-04-24

## 2023-04-24 NOTE — TELEPHONE ENCOUNTER
----- Message from Arian Gutierres MD sent at 4/20/2023  2:57 PM EDT -----  Patient requesting primary c/s  Please schedule with me and Jazmín Potter on 5/16 at 730  Thank you!

## 2023-04-24 NOTE — TELEPHONE ENCOUNTER
Patient scheduled for c/s on 5/16/2023 at 730 with [id number]   Patient notified

## 2023-05-04 ENCOUNTER — ROUTINE PRENATAL (OUTPATIENT)
Dept: OBGYN CLINIC | Facility: CLINIC | Age: 33
End: 2023-05-04

## 2023-05-04 VITALS — BODY MASS INDEX: 31.28 KG/M2 | WEIGHT: 188 LBS | SYSTOLIC BLOOD PRESSURE: 128 MMHG | DIASTOLIC BLOOD PRESSURE: 80 MMHG

## 2023-05-04 DIAGNOSIS — Z3A.37 37 WEEKS GESTATION OF PREGNANCY: ICD-10-CM

## 2023-05-04 DIAGNOSIS — Z34.03 ENCOUNTER FOR SUPERVISION OF NORMAL FIRST PREGNANCY IN THIRD TRIMESTER: Primary | ICD-10-CM

## 2023-05-04 DIAGNOSIS — O36.63X0 EXCESSIVE FETAL GROWTH AFFECTING MANAGEMENT OF PREGNANCY IN THIRD TRIMESTER, SINGLE OR UNSPECIFIED FETUS: ICD-10-CM

## 2023-05-04 NOTE — PROGRESS NOTES
Routine Prenatal Visit  [facility]  [address], [address], [place], [place]    Assessment/Plan:  Josie Qiu is a 33y o  year old  at 42w2d who presents for routine prenatal visit  1  Encounter for supervision of normal first pregnancy in third trimester    2  37 weeks gestation of pregnancy  -     Strep B DNA probe, amplification    3  Excessive fetal growth affecting management of pregnancy in third trimester, single or unspecified fetus          Subjective:     CC: Prenatal care    Billy Ponce is a 33 y o   female who presents for routine prenatal care at 37w2d  Pregnancy ROS: no leakage of fluid, pelvic pain, or vaginal bleeding   good fetal movement  Birth plan reviewed    The following portions of the patient's history were reviewed and updated as appropriate: allergies, current medications, past family history, past medical history, obstetric history, gynecologic history, past social history, past surgical history and problem list       Objective:  /80   Wt 85 3 kg (188 lb)   LMP 2022 (Exact Date)   BMI 31 28 kg/m²   Pregravid Weight/BMI: 72 6 kg (160 lb) (BMI 26 63)  Current Weight: 85 3 kg (188 lb)   Total Weight Gain: 12 7 kg (28 lb)   Pre- Vitals    Flowsheet Row Most Recent Value   Prenatal Assessment    Fetal Heart Rate 135   Movement Present   Prenatal Vitals    Blood Pressure 128/80   Weight - Scale 85 3 kg (188 lb)   Urine Albumin/Glucose    Dilation/Effacement/Station    Vaginal Drainage    Edema    LLE Edema None   RLE Edema None   Facial Edema None           General: Well appearing, no distress  Respiratory: Unlabored breathing  Cardiovascular: Regular rate  Abdomen: Soft, gravid, nontender  Fundal Height: Appropriate for gestational age  Extremities: Warm and well perfused  Non tender

## 2023-05-04 NOTE — PATIENT INSTRUCTIONS
Fetal Movement   WHAT YOU NEED TO KNOW:   Fetal movements are the kicks, rolls, and hiccups of your unborn baby  You may start to feel these movements when you are 20 weeks pregnant  The movements grow stronger and more frequent as your baby grows  Fetal movements show that your unborn baby is getting the oxygen and nutrients he or she needs before birth  Fewer fetal movements may signal a problem with your baby's health  DISCHARGE INSTRUCTIONS:   Follow up with your doctor or obstetrician as directed:  Write down your questions so you remember to ask them during your visits  Normal fetal movement:  Fetal activity can be described by 4 states, from least to most active  During quiet sleep, your unborn baby may be still for up to 2 hours  During active sleep, he or she kicks, rolls, and moves often  During the quiet awake state, he or she may only move his or her eyes  The active awake state includes strong kicks and rolls  What affects fetal movement:  You may feel your baby move more after you eat, or after you drink caffeine  You may feel your baby move less while you are more active, such as when you exercise  You may also feel fewer movements if you are obese  Certain medicines can change your baby's movements  Tell your healthcare provider about the medicines you are taking  Track fetal movements at home:  Fetal movement is most often felt when you lie quietly on your side  Your healthcare provider may ask you to count movements for 2 hours  He or she may ask you to track how long it takes for your baby to move 10 times  Keep a log of your baby's movements  Contact your doctor or obstetrician if:   It takes longer than usual to feel 8 of your unborn baby's movements  You do not feel your unborn baby move at least 10 times in 2 hours  The skin on your hands, feet, and around your eyes is more swollen than usual     You have a headache for at least 24 hours      Tiny red dots appear on your skin     Your belly is tender when you press on it  You have questions or concerns about your condition or care  Return to the emergency department if:   You do not feel your unborn baby move for 12 hours  You feel cramping or constant pain in your abdomen  You have heavy bleeding from your vagina  You have a severe headache and cannot see clearly  You are having trouble breathing or are vomiting  You have a seizure  © Copyright Alexia Gallagher 2022 Information is for End User's use only and may not be sold, redistributed or otherwise used for commercial purposes  The above information is an  only  It is not intended as medical advice for individual conditions or treatments  Talk to your doctor, nurse or pharmacist before following any medical regimen to see if it is safe and effective for you  Early Labor Signs   WHAT YOU NEED TO KNOW:   Early labor signs can happen weeks, days, or hours before your baby is ready to be delivered  You may have false labor signs, which are also called [place] contractions  False labor is common and may happen several weeks or days before your actual labor  The contractions are not regular, and do not get closer together  The pain is usually mild, does not worsen, and is felt only in front  [place] contractions may happen later in the day, and stop after you change position, walk, or rest   DISCHARGE INSTRUCTIONS:   Call 911 for any of the following: You have heavy vaginal bleeding  You cannot get to the hospital before the baby starts to come out  Return to the emergency department if:   You have regular, painful contractions that are less than 5 minutes apart and last 30 to 70 seconds each  You have a constant trickle or sudden gush of clear fluid from your vagina  You notice a sudden decrease in your baby's movement      Contact your obstetrician or healthcare provider if:   You have pain in your lower back or abdomen that does not get better when you change positions  You have bloody mucus or show  You have questions or concerns about your condition or care  Early Labor signs and symptoms:   Lightening  occurs when your baby drops inside your pelvis  You may feel increased pressure in your pelvis  This may happen a few weeks to a few hours before your labor begins  Contractions  are cramps and tightening that occur in your uterus to help move the baby through your birth canal  Contractions occur regularly and more often each time  Each one lasts about 30 to 70 seconds, and gets stronger until you deliver your baby  Contractions do not go away with movement  The pain usually starts in your lower back and moves to your abdomen  Effacement  occurs when your cervix softens and thins, so it can easily open for the baby  You will not be able to feel effacement  Your healthcare provider will examine your cervix for effacement  Dilation  is widening of your cervix  Your healthcare provider will examine your cervix for dilation  Your cervix may start to dilate weeks before your baby is delivered  Your cervix will be fully opened and ready for delivery when it is dilated to 10 centimeters  Increased discharge  from your vagina may occur  It may be brown, pink, clear, or slightly bloody  This discharge may also be called bloody show  Bloody show is a mucus plug that forms and blocks your cervix during pregnancy  The discharge may mean that your cervix is opening up and getting ready for delivery  Rupture of membranes  is a sudden release of clear fluid from your vagina  Ruptured membranes means your water broke  Your healthcare provider may need to break your water if it does not happen on its own  © Copyright Madonna Alexis 2022 Information is for End User's use only and may not be sold, redistributed or otherwise used for commercial purposes  The above information is an  only   It is not intended as medical advice for individual conditions or treatments  Talk to your doctor, nurse or pharmacist before following any medical regimen to see if it is safe and effective for you

## 2023-05-08 LAB — GP B STREP DNA SPEC QL NAA+PROBE: POSITIVE

## 2023-05-09 ENCOUNTER — ROUTINE PRENATAL (OUTPATIENT)
Dept: OBGYN CLINIC | Facility: MEDICAL CENTER | Age: 33
End: 2023-05-09

## 2023-05-09 VITALS — BODY MASS INDEX: 31.78 KG/M2 | SYSTOLIC BLOOD PRESSURE: 128 MMHG | DIASTOLIC BLOOD PRESSURE: 82 MMHG | WEIGHT: 191 LBS

## 2023-05-09 DIAGNOSIS — O36.63X0 EXCESSIVE FETAL GROWTH AFFECTING MANAGEMENT OF PREGNANCY IN THIRD TRIMESTER, SINGLE OR UNSPECIFIED FETUS: Primary | ICD-10-CM

## 2023-05-09 DIAGNOSIS — B00.9 HERPES SIMPLEX TYPE 1 INFECTION: ICD-10-CM

## 2023-05-09 NOTE — PATIENT INSTRUCTIONS
Kick Counts in Pregnancy   WHAT YOU NEED TO KNOW:   Kick counts measure how much your baby is moving in your womb  A kick from your baby can be felt as a twist, turn, swish, roll, or jab  It is common to feel your baby kicking at 26 to 28 weeks of pregnancy  You may feel your baby kick as early as 20 weeks of pregnancy  You may want to start counting at 28 weeks  DISCHARGE INSTRUCTIONS:   Contact your doctor immediately if:   You feel a change in the number of kicks or movements of your baby  You feel fewer than 10 kicks within 2 hours  You have questions or concerns about your baby's movements  Why measure kick counts:  Your baby's movement may provide information about your baby's health  He or she may move less, or not at all, if there are problems  Your baby may move less if he or she is not getting enough oxygen or nutrition from the placenta  Do not smoke while you are pregnant  Smoking decreases the amount of oxygen that gets to your baby  Talk to your healthcare provider if you need help to quit smoking  Tell your healthcare provider as soon as you feel a change in your baby's movements  When to measure kick counts:   Measure kick counts at the same time every day  Measure kick counts when your baby is awake and most active  Your baby may be most active in the evening  How to measure kick counts:  Check that your baby is awake before you measure kick counts  You can wake up your baby by lightly pushing on your belly, walking, or drinking something cold  Your healthcare provider may tell you different ways to measure kick counts  You may be told to do the following:  Use a chart or clock to keep track of the time you start and finish counting  Sit in a chair or lie on your left side  Place your hands on the largest part of your belly  Count until you reach 10 kicks  Write down how much time it takes to count 10 kicks  It may take 30 minutes to 2 hours to count 10 kicks  It should not take more than 2 hours to count 10 kicks  Follow up with your doctor as directed:  Write down your questions so you remember to ask them during your visits  © Copyright Meron Tobar 2022 Information is for End User's use only and may not be sold, redistributed or otherwise used for commercial purposes  The above information is an  only  It is not intended as medical advice for individual conditions or treatments  Talk to your doctor, nurse or pharmacist before following any medical regimen to see if it is safe and effective for you

## 2023-05-09 NOTE — PROGRESS NOTES
Rupal Modi is a 33y o  year old  at 38w0d for routine prenatal visit    + FM, no vaginal bleeding, contractions, or LOF  Complaints: No   Most recent ultrasound and labs reviewed    Has elective  section scheduled next week- we discussed pre op / intraop and post op course   Has Hibiclens at home

## 2023-05-10 ENCOUNTER — ANESTHESIA EVENT (INPATIENT)
Dept: LABOR AND DELIVERY | Facility: HOSPITAL | Age: 33
End: 2023-05-10

## 2023-05-13 ENCOUNTER — HOSPITAL ENCOUNTER (INPATIENT)
Facility: HOSPITAL | Age: 33
LOS: 3 days | Discharge: HOME | End: 2023-05-17
Attending: OBSTETRICS & GYNECOLOGY | Admitting: OBSTETRICS & GYNECOLOGY

## 2023-05-13 ENCOUNTER — NURSE TRIAGE (OUTPATIENT)
Dept: OTHER | Facility: OTHER | Age: 33
End: 2023-05-13

## 2023-05-13 DIAGNOSIS — O36.63X0 EXCESSIVE FETAL GROWTH AFFECTING MANAGEMENT OF PREGNANCY IN THIRD TRIMESTER, SINGLE OR UNSPECIFIED FETUS: Primary | ICD-10-CM

## 2023-05-13 DIAGNOSIS — Z98.891 STATUS POST PRIMARY LOW TRANSVERSE CESAREAN SECTION: ICD-10-CM

## 2023-05-13 PROBLEM — R03.0 ELEVATED BP WITHOUT DIAGNOSIS OF HYPERTENSION: Status: ACTIVE | Noted: 2023-05-13

## 2023-05-14 PROBLEM — O09.899 RUBELLA NON-IMMUNE STATUS, ANTEPARTUM: Status: ACTIVE | Noted: 2023-05-14

## 2023-05-14 PROBLEM — Z98.891 STATUS POST PRIMARY LOW TRANSVERSE CESAREAN SECTION: Status: ACTIVE | Noted: 2023-05-14

## 2023-05-14 PROBLEM — Z28.39 RUBELLA NON-IMMUNE STATUS, ANTEPARTUM: Status: ACTIVE | Noted: 2023-05-14

## 2023-05-14 PROBLEM — Z3A.38 38 WEEKS GESTATION OF PREGNANCY: Status: ACTIVE | Noted: 2022-10-11

## 2023-05-14 PROBLEM — O13.9 GESTATIONAL HTN: Status: ACTIVE | Noted: 2023-05-13

## 2023-05-14 LAB
ABO GROUP BLD: NORMAL
ALBUMIN SERPL BCP-MCNC: 3.6 G/DL (ref 3.5–5)
ALP SERPL-CCNC: 185 U/L (ref 34–104)
ALT SERPL W P-5'-P-CCNC: 13 U/L (ref 7–52)
ANION GAP SERPL CALCULATED.3IONS-SCNC: 10 MMOL/L (ref 4–13)
AST SERPL W P-5'-P-CCNC: 20 U/L (ref 13–39)
BASE EXCESS BLDCOA CALC-SCNC: -8.6 MMOL/L (ref 3–11)
BASE EXCESS BLDCOV CALC-SCNC: -4.4 MMOL/L (ref 1–9)
BILIRUB SERPL-MCNC: 0.33 MG/DL (ref 0.2–1)
BLD GP AB SCN SERPL QL: NEGATIVE
BUN SERPL-MCNC: 10 MG/DL (ref 5–25)
CALCIUM SERPL-MCNC: 9.4 MG/DL (ref 8.4–10.2)
CHLORIDE SERPL-SCNC: 104 MMOL/L (ref 96–108)
CO2 SERPL-SCNC: 22 MMOL/L (ref 21–32)
CREAT SERPL-MCNC: 0.74 MG/DL (ref 0.6–1.3)
CREAT UR-MCNC: 46.3 MG/DL
ERYTHROCYTE [DISTWIDTH] IN BLOOD BY AUTOMATED COUNT: 12.8 % (ref 11.6–15.1)
GFR SERPL CREATININE-BSD FRML MDRD: 108 ML/MIN/1.73SQ M
GLUCOSE SERPL-MCNC: 84 MG/DL (ref 65–140)
HCO3 BLDCOA-SCNC: 20.4 MMOL/L (ref 17.3–27.3)
HCO3 BLDCOV-SCNC: 21 MMOL/L (ref 12.2–28.6)
HCT VFR BLD AUTO: 37.7 % (ref 34.8–46.1)
HGB BLD-MCNC: 12.8 G/DL (ref 11.5–15.4)
MCH RBC QN AUTO: 31.3 PG (ref 26.8–34.3)
MCHC RBC AUTO-ENTMCNC: 34 G/DL (ref 31.4–37.4)
MCV RBC AUTO: 92 FL (ref 82–98)
O2 CT VFR BLDCOA CALC: 5.6 ML/DL
OXYHGB MFR BLDCOA: 24.5 %
OXYHGB MFR BLDCOV: 47.1 %
PCO2 BLDCOA: 55.6 MM[HG] (ref 30–60)
PCO2 BLDCOV: 39.6 MM HG (ref 27–43)
PH BLDCOA: 7.18 [PH] (ref 7.23–7.43)
PH BLDCOV: 7.34 [PH] (ref 7.19–7.49)
PLATELET # BLD AUTO: 184 THOUSANDS/UL (ref 149–390)
PMV BLD AUTO: 11.4 FL (ref 8.9–12.7)
PO2 BLDCOA: 16.1 MM HG (ref 5–25)
PO2 BLDCOV: 21.5 MM HG (ref 15–45)
POTASSIUM SERPL-SCNC: 3.7 MMOL/L (ref 3.5–5.3)
PROT SERPL-MCNC: 7 G/DL (ref 6.4–8.4)
PROT UR-MCNC: 5 MG/DL
PROT/CREAT UR: 0.11 MG/G{CREAT} (ref 0–0.1)
RBC # BLD AUTO: 4.09 MILLION/UL (ref 3.81–5.12)
RH BLD: POSITIVE
SAO2 % BLDCOV: 9.8 ML/DL
SODIUM SERPL-SCNC: 136 MMOL/L (ref 135–147)
SPECIMEN EXPIRATION DATE: NORMAL
TREPONEMA PALLIDUM IGG+IGM AB [PRESENCE] IN SERUM OR PLASMA BY IMMUNOASSAY: NORMAL
WBC # BLD AUTO: 11.68 THOUSAND/UL (ref 4.31–10.16)

## 2023-05-14 PROCEDURE — 4A1HXCZ MONITORING OF PRODUCTS OF CONCEPTION, CARDIAC RATE, EXTERNAL APPROACH: ICD-10-PCS | Performed by: OBSTETRICS & GYNECOLOGY

## 2023-05-14 RX ORDER — ONDANSETRON 2 MG/ML
INJECTION INTRAMUSCULAR; INTRAVENOUS
Status: COMPLETED
Start: 2023-05-14 | End: 2023-05-14

## 2023-05-14 RX ORDER — OXYCODONE HYDROCHLORIDE 5 MG/1
10 TABLET ORAL EVERY 4 HOURS PRN
Status: DISCONTINUED | OUTPATIENT
Start: 2023-05-15 | End: 2023-05-17 | Stop reason: HOSPADM

## 2023-05-14 RX ORDER — OXYCODONE HYDROCHLORIDE 5 MG/1
5 TABLET ORAL EVERY 4 HOURS PRN
Status: DISCONTINUED | OUTPATIENT
Start: 2023-05-15 | End: 2023-05-17 | Stop reason: HOSPADM

## 2023-05-14 RX ORDER — FENTANYL CITRATE/PF 50 MCG/ML
25 SYRINGE (ML) INJECTION
Status: DISCONTINUED | OUTPATIENT
Start: 2023-05-14 | End: 2023-05-14

## 2023-05-14 RX ORDER — ENOXAPARIN SODIUM 100 MG/ML
40 INJECTION SUBCUTANEOUS
Status: DISCONTINUED | OUTPATIENT
Start: 2023-05-15 | End: 2023-05-17 | Stop reason: HOSPADM

## 2023-05-14 RX ORDER — MORPHINE SULFATE 0.5 MG/ML
INJECTION, SOLUTION EPIDURAL; INTRATHECAL; INTRAVENOUS
Status: DISPENSED
Start: 2023-05-14 | End: 2023-05-14

## 2023-05-14 RX ORDER — ONDANSETRON 2 MG/ML
4 INJECTION INTRAMUSCULAR; INTRAVENOUS ONCE AS NEEDED
Status: DISCONTINUED | OUTPATIENT
Start: 2023-05-14 | End: 2023-05-14

## 2023-05-14 RX ORDER — ONDANSETRON 2 MG/ML
INJECTION INTRAMUSCULAR; INTRAVENOUS
Status: DISPENSED
Start: 2023-05-14 | End: 2023-05-14

## 2023-05-14 RX ORDER — METOCLOPRAMIDE HYDROCHLORIDE 5 MG/ML
10 INJECTION INTRAMUSCULAR; INTRAVENOUS EVERY 6 HOURS PRN
Status: DISCONTINUED | OUTPATIENT
Start: 2023-05-14 | End: 2023-05-16

## 2023-05-14 RX ORDER — MORPHINE SULFATE 1 MG/ML
INJECTION, SOLUTION EPIDURAL; INTRATHECAL; INTRAVENOUS AS NEEDED
Status: DISCONTINUED | OUTPATIENT
Start: 2023-05-14 | End: 2023-05-14

## 2023-05-14 RX ORDER — BUPIVACAINE HYDROCHLORIDE 7.5 MG/ML
INJECTION, SOLUTION INTRASPINAL AS NEEDED
Status: DISCONTINUED | OUTPATIENT
Start: 2023-05-14 | End: 2023-05-14

## 2023-05-14 RX ORDER — ACETAMINOPHEN 325 MG/1
650 TABLET ORAL EVERY 6 HOURS SCHEDULED
Status: COMPLETED | OUTPATIENT
Start: 2023-05-14 | End: 2023-05-17

## 2023-05-14 RX ORDER — IBUPROFEN 600 MG/1
600 TABLET ORAL EVERY 6 HOURS
Status: DISCONTINUED | OUTPATIENT
Start: 2023-05-15 | End: 2023-05-17 | Stop reason: HOSPADM

## 2023-05-14 RX ORDER — LORATADINE 10 MG/1
10 TABLET ORAL DAILY PRN
Status: DISCONTINUED | OUTPATIENT
Start: 2023-05-14 | End: 2023-05-17 | Stop reason: HOSPADM

## 2023-05-14 RX ORDER — CEFAZOLIN SODIUM 2 G/50ML
2000 SOLUTION INTRAVENOUS ONCE
Status: COMPLETED | OUTPATIENT
Start: 2023-05-14 | End: 2023-05-14

## 2023-05-14 RX ORDER — VALACYCLOVIR HYDROCHLORIDE 500 MG/1
500 TABLET, FILM COATED ORAL EVERY 12 HOURS SCHEDULED
Status: DISCONTINUED | OUTPATIENT
Start: 2023-05-14 | End: 2023-05-14

## 2023-05-14 RX ORDER — KETOROLAC TROMETHAMINE 30 MG/ML
30 INJECTION, SOLUTION INTRAMUSCULAR; INTRAVENOUS EVERY 6 HOURS SCHEDULED
Status: DISPENSED | OUTPATIENT
Start: 2023-05-14 | End: 2023-05-15

## 2023-05-14 RX ORDER — ONDANSETRON 2 MG/ML
INJECTION INTRAMUSCULAR; INTRAVENOUS AS NEEDED
Status: DISCONTINUED | OUTPATIENT
Start: 2023-05-14 | End: 2023-05-14

## 2023-05-14 RX ORDER — FENTANYL CITRATE 50 UG/ML
INJECTION, SOLUTION INTRAMUSCULAR; INTRAVENOUS
Status: COMPLETED
Start: 2023-05-14 | End: 2023-05-14

## 2023-05-14 RX ORDER — CEFAZOLIN SODIUM 1 G/3ML
INJECTION, POWDER, FOR SOLUTION INTRAMUSCULAR; INTRAVENOUS
Status: DISPENSED
Start: 2023-05-14 | End: 2023-05-14

## 2023-05-14 RX ORDER — FENTANYL CITRATE 50 UG/ML
INJECTION, SOLUTION INTRAMUSCULAR; INTRAVENOUS AS NEEDED
Status: DISCONTINUED | OUTPATIENT
Start: 2023-05-14 | End: 2023-05-14

## 2023-05-14 RX ORDER — KETOROLAC TROMETHAMINE 30 MG/ML
INJECTION, SOLUTION INTRAMUSCULAR; INTRAVENOUS
Status: COMPLETED
Start: 2023-05-14 | End: 2023-05-14

## 2023-05-14 RX ORDER — TRISODIUM CITRATE DIHYDRATE AND CITRIC ACID MONOHYDRATE 500; 334 MG/5ML; MG/5ML
30 SOLUTION ORAL ONCE AS NEEDED
Status: DISCONTINUED | OUTPATIENT
Start: 2023-05-14 | End: 2023-05-14

## 2023-05-14 RX ORDER — PHENYLEPHRINE HCL IN 0.9% NACL 1 MG/10 ML
SYRINGE (ML) INTRAVENOUS
Status: DISPENSED
Start: 2023-05-14 | End: 2023-05-14

## 2023-05-14 RX ORDER — DIAPER,BRIEF,INFANT-TODD,DISP
1 EACH MISCELLANEOUS DAILY PRN
Status: DISCONTINUED | OUTPATIENT
Start: 2023-05-14 | End: 2023-05-17 | Stop reason: HOSPADM

## 2023-05-14 RX ORDER — SODIUM CHLORIDE, SODIUM LACTATE, POTASSIUM CHLORIDE, CALCIUM CHLORIDE 600; 310; 30; 20 MG/100ML; MG/100ML; MG/100ML; MG/100ML
125 INJECTION, SOLUTION INTRAVENOUS CONTINUOUS
Status: DISCONTINUED | OUTPATIENT
Start: 2023-05-14 | End: 2023-05-17 | Stop reason: HOSPADM

## 2023-05-14 RX ORDER — SIMETHICONE 80 MG
80 TABLET,CHEWABLE ORAL 4 TIMES DAILY PRN
Status: DISCONTINUED | OUTPATIENT
Start: 2023-05-14 | End: 2023-05-17 | Stop reason: HOSPADM

## 2023-05-14 RX ORDER — EPINEPHRINE 1 MG/ML
INJECTION, SOLUTION, CONCENTRATE INTRAVENOUS
Status: DISPENSED
Start: 2023-05-14 | End: 2023-05-14

## 2023-05-14 RX ORDER — ONDANSETRON 2 MG/ML
4 INJECTION INTRAMUSCULAR; INTRAVENOUS EVERY 8 HOURS PRN
Status: DISCONTINUED | OUTPATIENT
Start: 2023-05-14 | End: 2023-05-17 | Stop reason: HOSPADM

## 2023-05-14 RX ORDER — KETOROLAC TROMETHAMINE 30 MG/ML
INJECTION, SOLUTION INTRAMUSCULAR; INTRAVENOUS AS NEEDED
Status: DISCONTINUED | OUTPATIENT
Start: 2023-05-14 | End: 2023-05-14

## 2023-05-14 RX ORDER — DIPHENHYDRAMINE HYDROCHLORIDE 50 MG/ML
25 INJECTION INTRAMUSCULAR; INTRAVENOUS EVERY 6 HOURS PRN
Status: DISCONTINUED | OUTPATIENT
Start: 2023-05-14 | End: 2023-05-17

## 2023-05-14 RX ORDER — DOCUSATE SODIUM 100 MG/1
100 CAPSULE, LIQUID FILLED ORAL 2 TIMES DAILY
Status: DISCONTINUED | OUTPATIENT
Start: 2023-05-14 | End: 2023-05-17 | Stop reason: HOSPADM

## 2023-05-14 RX ORDER — OXYTOCIN/RINGER'S LACTATE 30/500 ML
PLASTIC BAG, INJECTION (ML) INTRAVENOUS CONTINUOUS PRN
Status: DISCONTINUED | OUTPATIENT
Start: 2023-05-14 | End: 2023-05-14

## 2023-05-14 RX ORDER — NALOXONE HYDROCHLORIDE 0.4 MG/ML
0.1 INJECTION, SOLUTION INTRAMUSCULAR; INTRAVENOUS; SUBCUTANEOUS
Status: ACTIVE | OUTPATIENT
Start: 2023-05-14 | End: 2023-05-15

## 2023-05-14 RX ADMIN — KETOROLAC TROMETHAMINE 30 MG: 30 INJECTION, SOLUTION INTRAMUSCULAR; INTRAVENOUS at 13:10

## 2023-05-14 RX ADMIN — ONDANSETRON 4 MG: 2 INJECTION INTRAMUSCULAR; INTRAVENOUS at 06:49

## 2023-05-14 RX ADMIN — KETOROLAC TROMETHAMINE 30 MG: 30 INJECTION, SOLUTION INTRAMUSCULAR; INTRAVENOUS at 19:23

## 2023-05-14 RX ADMIN — SODIUM CHLORIDE, SODIUM LACTATE, POTASSIUM CHLORIDE, AND CALCIUM CHLORIDE: .6; .31; .03; .02 INJECTION, SOLUTION INTRAVENOUS at 06:45

## 2023-05-14 RX ADMIN — SODIUM CHLORIDE, SODIUM LACTATE, POTASSIUM CHLORIDE, AND CALCIUM CHLORIDE 125 ML/HR: .6; .31; .03; .02 INJECTION, SOLUTION INTRAVENOUS at 09:52

## 2023-05-14 RX ADMIN — KETOROLAC TROMETHAMINE 30 MG: 30 INJECTION, SOLUTION INTRAMUSCULAR at 07:03

## 2023-05-14 RX ADMIN — FENTANYL CITRATE 15 MCG: 50 INJECTION INTRAMUSCULAR; INTRAVENOUS at 06:17

## 2023-05-14 RX ADMIN — SODIUM CHLORIDE, SODIUM LACTATE, POTASSIUM CHLORIDE, AND CALCIUM CHLORIDE 125 ML/HR: .6; .31; .03; .02 INJECTION, SOLUTION INTRAVENOUS at 04:51

## 2023-05-14 RX ADMIN — DOCUSATE SODIUM 100 MG: 100 CAPSULE, LIQUID FILLED ORAL at 17:18

## 2023-05-14 RX ADMIN — MORPHINE SULFATE 0.1 MG: 1 INJECTION, SOLUTION EPIDURAL; INTRATHECAL; INTRAVENOUS at 06:15

## 2023-05-14 RX ADMIN — ONDANSETRON 4 MG: 2 INJECTION INTRAMUSCULAR; INTRAVENOUS at 07:00

## 2023-05-14 RX ADMIN — ACETAMINOPHEN 650 MG: 325 TABLET ORAL at 17:18

## 2023-05-14 RX ADMIN — Medication 250 MILLI-UNITS/MIN: at 06:44

## 2023-05-14 RX ADMIN — ACETAMINOPHEN 650 MG: 325 TABLET ORAL at 23:03

## 2023-05-14 RX ADMIN — ACETAMINOPHEN 650 MG: 325 TABLET ORAL at 11:20

## 2023-05-14 RX ADMIN — SODIUM CHLORIDE, SODIUM LACTATE, POTASSIUM CHLORIDE, AND CALCIUM CHLORIDE 125 ML/HR: .6; .31; .03; .02 INJECTION, SOLUTION INTRAVENOUS at 02:00

## 2023-05-14 RX ADMIN — SODIUM CHLORIDE, SODIUM LACTATE, POTASSIUM CHLORIDE, AND CALCIUM CHLORIDE: .6; .31; .03; .02 INJECTION, SOLUTION INTRAVENOUS at 06:09

## 2023-05-14 RX ADMIN — CEFAZOLIN SODIUM 2000 MG: 2 SOLUTION INTRAVENOUS at 06:21

## 2023-05-14 RX ADMIN — BUPIVACAINE HYDROCHLORIDE IN DEXTROSE 2 ML: 7.5 INJECTION, SOLUTION SUBARACHNOID at 06:17

## 2023-05-14 NOTE — PLAN OF CARE
Problem: POSTPARTUM  Goal: Experiences normal postpartum course  Description: INTERVENTIONS:  - Monitor maternal vital signs  - Assess uterine involution and lochia  Outcome: Progressing  Goal: Appropriate maternal -  bonding  Description: INTERVENTIONS:  - Identify family support  - Assess for appropriate maternal/infant bonding   -Encourage maternal/infant bonding opportunities  - Referral to  or  as needed  Outcome: Progressing  Goal: Establishment of infant feeding pattern  Description: INTERVENTIONS:  - Assess breast/bottle feeding  - Refer to lactation as needed  Outcome: Progressing  Goal: Incision(s), wounds(s) or drain site(s) healing without S/S of infection  Description: INTERVENTIONS  - Assess and document dressing, incision, wound bed, drain sites and surrounding tissue  - Provide patient and family education  -Outcome: Progressing     Problem: ANTEPARTUM  Goal: Maintain pregnancy as long as maternal and/or fetal condition is stable  Description: INTERVENTIONS:  - Maternal surveillance  - Fetal surveillance  - Monitor uterine activity  - Medications as ordered  - Bedrest  Outcome: Completed     Problem: BIRTH - VAGINAL/ SECTION  Goal: Fetal and maternal status remain reassuring during the birth process  Description: INTERVENTIONS:  - Monitor vital signs  - Monitor fetal heart rate  - Monitor uterine activity  - Monitor labor progression (vaginal delivery)  - DVT prophylaxis  - Antibiotic prophylaxis  Outcome: Completed  Goal: Emotionally satisfying birthing experience for mother/fetus  Description: Interventions:  - Assess, plan, implement and evaluate the nursing care given to the patient in labor  - Advocate the philosophy that each childbirth experience is a unique experience and support the family's chosen level of involvement and control during the labor process   - Actively participate in both the patient's and family's teaching of the birth process  - Consider cultural, [employer] and age-specific factors and plan care for the patient in labor  Outcome: Completed

## 2023-05-14 NOTE — TELEPHONE ENCOUNTER
Per on call-  Patient can present to L&D  Patient notified of the above and verbalized understanding

## 2023-05-14 NOTE — TELEPHONE ENCOUNTER
"38w4d, JOSE 5/23  Started with abdominal cramping about 4:30 today, but noticed around 8pm that the pain was becoming more consistent  Contractions have been every 3-5 min, lasting 45 seconds  Denies leaking of fluid, or vaginal bleeding  Normal fetal movement  First baby  On call provider notified    Reason for Disposition  • [1] First baby (primipara) AND [2] contractions < 6 minutes apart  AND [3] present 2 hours    Answer Assessment - Initial Assessment Questions  1  ONSET: \"When did the symptoms begin? \"         About 8pm this evening  2  CONTRACTIONS: \"Describe the contractions that you are having  \" (e g , duration, frequency, regularity, severity)      Every 3-5 min, lasting 45 seconds  3  JOSE: \"What date are you expecting to deliver? \"      5/23  4  PARITY: \"Have you had a baby before? \" If Yes, ask: \"How long did the labor last?\"      First baby  5  FETAL MOVEMENT: \"Has the baby's movement decreased or changed significantly from normal?\"      normal  6  OTHER SYMPTOMS: \"Do you have any other symptoms? \" (e g , leaking fluid from vagina, vaginal bleeding, fever, hand/facial swelling)      denies    Protocols used: PREGNANCY - LABOR-ADULT-AH      "

## 2023-05-14 NOTE — PLAN OF CARE
Problem: ANTEPARTUM  Goal: Maintain pregnancy as long as maternal and/or fetal condition is stable  Description: INTERVENTIONS:  - Maternal surveillance  - Fetal surveillance  - Monitor uterine activity  - Medications as ordered  - Bedrest  Outcome: Progressing     Problem: BIRTH - VAGINAL/ SECTION  Goal: Fetal and maternal status remain reassuring during the birth process  Description: INTERVENTIONS:  - Monitor vital signs  - Monitor fetal heart rate  - Monitor uterine activity  - Monitor labor progression (vaginal delivery)  - DVT prophylaxis  - Antibiotic prophylaxis  Outcome: Progressing  Goal: Emotionally satisfying birthing experience for mother/fetus  Description: Interventions:  - Assess, plan, implement and evaluate the nursing care given to the patient in labor  - Advocate the philosophy that each childbirth experience is a unique experience and support the family's chosen level of involvement and control during the labor process   - Actively participate in both the patient's and family's teaching of the birth process  - Consider cultural, Sikh and age-specific factors and plan care for the patient in labor  Outcome: Progressing     Problem: POSTPARTUM  Goal: Experiences normal postpartum course  Description: INTERVENTIONS:  - Monitor maternal vital signs  - Assess uterine involution and lochia  Outcome: Progressing  Goal: Appropriate maternal -  bonding  Description: INTERVENTIONS:  - Identify family support  - Assess for appropriate maternal/infant bonding   -Encourage maternal/infant bonding opportunities  - Referral to  or  as needed  Outcome: Progressing  Goal: Establishment of infant feeding pattern  Description: INTERVENTIONS:  - Assess breast/bottle feeding  - Refer to lactation as needed  Outcome: Progressing  Goal: Incision(s), wounds(s) or drain site(s) healing without S/S of infection  Description: INTERVENTIONS  - Assess and document dressing, incision, wound bed, drain sites and surrounding tissue  - Provide patient and family education  Outcome: Progressing

## 2023-05-14 NOTE — OP NOTE
Section Procedure Note    Indications: suspected macrosomia, gestational hypertension, early labor    Pre-operative Diagnosis: 38 week 5 day pregnancy  Post-operative Diagnosis: same    Surgeon: Bard Alexander MD     Assistants: Wanda Alvares MD      Anesthesia: Spinal anesthesia    ASA Class: 2      Findings:  Viable female weighing 7lbs 6oz;  Apgar scores of 9 at one minute and 9 at five minutes  clear amniotic fluid  Normal uterus, tubes, and ovaries  Normal placenta with 3-vessel cord     Umbilical Cord Gases     pH Base Excess   Arterial Results from last 7 days   Lab Units 23  07   PH COA  7 182*     Results from last  days   Lab Units 23  0706   BASE EXC COA mmol/L -8 6*      Venous Results from last  days   Lab Units 23  07   PH COV  7 342     Results from last  days   Lab Units 23  07   BASE EXC COV mmol/L -4 4*          Specimens: Arterial and venous cord gases, cord blood, segment of umbilical cord, placenta to storage     Estimated Blood Loss:  404 mL                   Complications:  None; patient tolerated the procedure well  Disposition: PACU  and hemodynamically stable           Condition: stable    Procedure Details   The patient was seen prior to the procedure  Risks, benefits, possible complications, alternate treatment options, and expected outcomes were discussed with the patient  The patient agreed with the proposed plan and gave informed consent  The patient was taken to [facility] Operating Room  Patient received spinal with Duramorph administered by Dr Israel Martin  Fetal heart tones had been assessed and a Monroe catheter was placed aseptically  SCDs were placed to lower extremities bilaterally  The abdomen was prepped with Chloraprep and following appropriate drying time, the patient was draped in the usual sterile manner  A Time Out was held and the above information confirmed    The patient was identified as Herb Em and the procedure verified as  Delivery  The patient received 2 g of Ancef prior to the procedure    A Pfannenstiel incision was made and carried down through the underlying subcutaneous tissue to the fascia using a scalpel  The rectus fascia was then nicked in the midline and dissected laterally using Mishra scissors  The superior edge of the  fascial incision was grasped with Kocher clamps bilaterally, tented upward and the underlying rectus muscles were dissected off bluntly, then sharply with Mishra scissors in the midline  This was repeated on the inferior edge of the fascia and dissected down to the pubic rami  The rectus muscles were  and the peritoneum was identified, entered, and extended laterally with blunt dissection  The bladder blade was inserted  A low transverse uterine incision was made with the scalpel and extended with blunt dissection cephalad and caudad  The amnion was entered bluntly yeilding clear colored fluid  The fetal head was palpated, elevated, and delivered through the uterine incision followed by the body without difficulty  The umbilical cord was clamped and cut  The infant was handed off to the  providers  Arterial and venous cord gases, cord blood, and a segment of umbilical cord were obtained for evaluation  The placenta delivered spontaneously with uterine fundal massage and appeared normal  The uterus was exteriorized and cleaned out with a moist lap sponge  The uterine incision was closed with a running locked suture of 0 Vicryl  A second layer of the same suture was used in an imbricating fashion  Excellent hemostasis was noted  Normal saline solution was used to irrigate the posterior culdesac  The uterus was returned to the abdomen  The gutters were inspected and cleared of all clots and debris with moist lap sponges  The rectus muscles were reapproximated with chromic suture in an interrupted fashion   The fascia was closed with a running suture of 0 Vicryl  The skin was closed with a subcuticular running suture of 3-0 Monocryl stratafix  Sterile dressing was applied  The patient appeared to tolerate the procedure very well  Lap sponge, needle, and instrument counts were correct x2  The patient was transferred to her postpartum recovery room in stable condition and her infant went to the  nursery  Attending Attestation: I was present for the entire procedure  and I was present for all critical portions of the procedure  Michelle Jennings

## 2023-05-14 NOTE — ANESTHESIA PREPROCEDURE EVALUATION
Procedure:   SECTION () (Uterus)    Relevant Problems   CARDIO   (+) Gestational HTN      GYN   (+) 38 weeks gestation of pregnancy      HEMATOLOGY   (+) Iron deficiency anemia during pregnancy      NEURO/PSYCH   (+) Pregnancy headache in first trimester      PULMONARY   (+) Exercise-induced asthma        Physical Exam    Airway    Mallampati score: II         Dental       Cardiovascular  Rhythm: regular, Rate: normal,     Pulmonary  Breath sounds clear to auscultation,     Other Findings        Anesthesia Plan  ASA Score- 2     Anesthesia Type-     Plan Factors-Exercise tolerance (METS): >4 METS  Chart reviewed  Existing labs reviewed  Patient summary reviewed  Patient is not a current smoker  Patient not instructed to abstain from smoking on day of procedure  Patient did not smoke on day of surgery  Obstructive sleep apnea risk education given perioperatively  Induction- intravenous  Postoperative Plan-     Informed Consent- Anesthetic plan and risks discussed with patient

## 2023-05-14 NOTE — TELEPHONE ENCOUNTER
Regardin Weeks and 4 days pregnant having contraction that are 3-5 minutes apart & last about a min  ----- Message from Abbie James sent at 2023 10:07 PM EDT -----  '' I'm 38 weeks and 4 days pregnant, I'm starting to have contraction that are 3-5 minutes apart and last about 45 seconds to a minutes for the past 4 hours  ''

## 2023-05-14 NOTE — H&P
History and Physical - Obstetrics  Mounika Stroud 33 y o  female MRN: [id number]  Unit/Bed#: L&D 326-01 Encounter: [id number]    ObGyn Provider: [facility]    ASSESSMENT AND PLAN:  Mounika Stroud is a 33y o  year-old  at [address] Day: 2, admitted for delivery in the setting of newly diagnosed gestational hypertension  By issue:    Gestational HTN  Assessment & Plan  Patient presented with contractions for r/o labor; was found to have persistently elevated blood pressure including 1 non-sustained severe range blood pressure     Asymptomatic   PreE labs negative  Meets criteria for GHTN; she is meeting criteria past 37 weeks gestation  Proceed toward delivery       Excessive fetal growth affecting management of pregnancy in third trimester  Assessment & Plan  Suspected fetal macrosomia of the fetus   EFW 92%ile on 4/10/23; counseled on risk of shoulder dystocia, etc  Previously by her OBGYN's and MFM  Risks/benefits of C/S reviewed  Patient desires to proceed with 1LTCS on admission as route of delivery     Rubella non-immune status, antepartum  Assessment & Plan  MMR postpartum    Iron deficiency anemia during pregnancy  Assessment & Plan  CBC on admission   Asymptomatic    Herpes simplex type 1 infection  Assessment & Plan  Hx oral HSV, no genital HSV  Taking Valtrex since 36 weeks gestation   No oral or genital lesions noted on admission   Planning for 1LTCS as route of delivery     Exercise-induced asthma  Assessment & Plan  Albuterol PRN    38 weeks gestation of pregnancy  Assessment & Plan  Meets criteria for medically indicated delivery (gestational HTN) ahead of scheduled elective primary section at 44 weeks gestation      The above assessment and plan was discussed with the admitting provider, Dr Francesca Zamudio    Expected LOS: >2 Midnights  Admission: INPATIENT     SUBJECTIVE:  Chief Complaint:  contractions    History of Present Illness:  Mounika Stroud is a 33 y o   female at 38w5d, JOSE 2023, by Last Menstrual Period, Hospital Day: 2, who presents with contractions since about 10 pm today  Was at baseball game tonight, and suddenly started feeling contractions  She last ate ice cream around 9:30 pm      Elevated blood pressures noted on admission  She denies headache, vision changes, SOB, chest pain, nausea, vomiting, RUQ pain, leg pain  She is slightly anxious about delivery  She is planning for 1LTCS as route of delivery due to suspected macrosomia of the fetus  She was previously counseled on her options during her prenatal care and desires to proceed with primary  section  She has a hx of oral HSV  Has gotten cold sores in the past  Never had genital HSV  Has been taking Valtrex prophylaxis since 36 weeks gestation  Denies any new oral or genital lesions  Review of Systems   Constitutional: Negative for chills and fever  Eyes: Negative for visual disturbance  Gastrointestinal: Positive for abdominal pain (contractions)  Negative for nausea and vomiting  Genitourinary: Negative for pelvic pain, vaginal bleeding and vaginal discharge  Neurological: Negative for dizziness, light-headedness and headaches  Psychiatric/Behavioral: Negative for confusion  All other systems reviewed and are negative  Current Pregnancy Problems:  Problem   Gestational Htn   Excessive Fetal Growth Affecting Management of Pregnancy in Third Trimester   Rubella Non-Immune Status, Antepartum   Iron Deficiency Anemia During Pregnancy   Near Syncope   Nausea and Vomiting in Pregnancy   Pregnancy Headache in First Trimester   38 Weeks Gestation of Pregnancy   Exercise-Induced Asthma   Herpes Simplex Type 1 Infection    Valtrex Rx in past  Oral cold sores         Past Obstetric History:   Patient's last menstrual period was 2022 (exact date)     OB History    Para Term  AB Living   1 0 0 0 0 0   SAB IAB Ectopic Multiple Live Births   0 0 0 0 0      # Outcome Date GA Lbr Norris/2nd Weight Sex Delivery Anes PTL Lv   1 Current                Pregnancy Plan:  Pregnancy: Meme Irving  Fetal sex: Female  Support person: Garo Abreu     Delivery Plans  Planned delivery method:   Planned delivery location: [facility] L&D  Planned anesthesia: Epidural  Acceptable blood products: All     Post-Delivery Plans  Feeding intentions: Breast Milk  Planned birth control: OCP    HISTORICAL INFORMATION:  Past Medical History:   Diagnosis Date   • Asthma    • Hypercholesterolemia     Last Assessed: [id number]   • Varicella      Past Surgical History:   Procedure Laterality Date   • ADENOIDECTOMY      Last Assessed: [id number]   • DENTAL SURGERY      Last Assessed: [id number]   • MYRINGOTOMY      Last Assessed: [id number]     Social History   Alcohol use: no  Tobacco use: no  Other substance use: no    Other: no    Family History   Problem Relation Age of Onset   • Hypertension Mother    • Hyperlipidemia Mother    • Heart block Mother    • Hyperlipidemia Father    • Skin cancer Father    • No Known Problems Sister    • Lung cancer Maternal Grandmother    • Heart block Maternal Grandmother    • Diabetes Maternal Grandfather    • Hypertension Maternal Grandfather    • Coronary artery disease Maternal Grandfather    • Hyperlipidemia Maternal Grandfather    • Breast cancer Paternal Grandmother    • Throat cancer Paternal Grandmother    • Alcohol abuse Paternal Grandmother    • Colon cancer Paternal Grandfather    • Liver cancer Paternal Grandfather    • Ovarian cancer Neg Hx        Allergies:    Allergies   Allergen Reactions   • Seasonal Ic  [Cholestatin]        Current Medications:  Current Outpatient Medications   Medication Instructions   • loratadine (CLARITIN) 10 mg, Oral, Daily   • Prenatal Vit-Fe Fumarate-FA (PRENATAL VITAMINS PO) Oral   • valACYclovir (VALTREX) 1,000 mg, Oral, 2 times daily, PRN       OBJECTIVE:  Vitals:  Patient Vitals for the past 24 hrs:   BP Temp Temp src Pulse Resp Height "Weight   05/14/23 0411 142/88 -- -- 66 -- -- --   05/14/23 0407 142/88 97 8 °F (36 6 °C) Oral 66 18 -- --   05/14/23 0305 135/82 -- -- 68 -- -- --   05/14/23 0255 155/87 -- -- 65 -- -- --   05/14/23 0250 137/84 -- -- 58 -- -- --   05/14/23 0235 145/86 -- -- 60 -- -- --   05/14/23 0220 144/88 -- -- 58 -- -- --   05/14/23 0150 137/83 -- -- 64 -- -- --   05/14/23 0135 138/92 -- -- 61 -- -- --   05/14/23 0105 142/93 -- -- 71 -- -- --   05/14/23 0050 141/95 -- -- 69 -- -- --   05/14/23 0030 161/96 -- -- 73 -- -- --   05/13/23 2320 139/92 -- -- 67 -- -- --   05/13/23 2250 133/90 98 8 °F (37 1 °C) Oral 72 18 5' 5\" (1 651 m) 83 kg (183 lb)     Body mass index is 30 45 kg/m²  Invasive Devices     Peripheral Intravenous Line  Duration           Peripheral IV 05/13/23 Right Forearm <1 day                Physical Exam  Vitals reviewed  Constitutional:       General: She is not in acute distress  Appearance: She is well-developed  HENT:      Head: Normocephalic and atraumatic  Eyes:      Conjunctiva/sclera: Conjunctivae normal    Cardiovascular:      Rate and Rhythm: Normal rate  Pulmonary:      Effort: Pulmonary effort is normal    Abdominal:      General: There is distension (gravid)  Palpations: Abdomen is soft  Tenderness: There is no abdominal tenderness (no RUQ pain)  Genitourinary:     Comments: Normal external genitalia  No evidence of genital lesions  Musculoskeletal:         General: Normal range of motion  Skin:     General: Skin is warm and dry  Neurological:      General: No focal deficit present  Mental Status: She is alert and oriented to person, place, and time  Mental status is at baseline  Psychiatric:         Mood and Affect: Mood normal          Behavior: Behavior normal          Thought Content:  Thought content normal          Cervical Exam:    Cervical Dilation: 2  Cervical Effacement: 80  Cervical Consistency: Soft  Fetal Station: -2  Presentation: Vertex  Position: " Unknown  Method: Manual  OB Examiner: Turner Camera    Estimated fetal weight: 4/10/23  RESULTS     Fetus # 1 of 1  Vertex presentation  Possible macrosomia  Placenta Location = Posterior  Placenta Grade = II     MEASUREMENTS (* Included In Average GA)     AC              32 7 cm        36 weeks 4 days* (>97%)  BPD              8 8 cm        35 weeks 5 days* (91%)  HC              32 9 cm        37 weeks 3 days* (93%)  Femur            6 6 cm        33 weeks 6 days* (40%)     Cerebellum       4 9 cm        36 weeks 1 day     EFW Hadlock 4   2789 grams - 6 lbs 2 oz                 (92%)    Presentation: vertex, confirmed by TAUS/cervical exam    Membranes: intact  Placenta: posterior     Fetal Heart Tracing:  FHT:   Baseline Rate: 120 bpm  Variability: Moderate 6-25 bpm  Accelerations: 15 x 15 or greater  Decelerations: None    [place]:  Contraction Frequency (minutes): irritability (mom on her side)  Contraction Duration (seconds): 40-50  Contraction Quality: Mild    Prenatal Labs:  Blood type: O+  Antibody: negative  HIV: negative   Hepatitis B: negative  RPR: negative  Rubella: immune  Varicella: unknown  Gonorrhea/Chlamydia: negative  1 hour Glucose: 120  Group B strep: positive    Other pertinent admission labs:  Recent Results (from the past 12 hour(s))   CBC and Platelet    Collection Time: 05/13/23 11:57 PM   Result Value Ref Range    WBC 11 68 (H) 4 31 - 10 16 Thousand/uL    RBC 4 09 3 81 - 5 12 Million/uL    Hemoglobin 12 8 11 5 - 15 4 g/dL    Hematocrit 37 7 34 8 - 46 1 %    MCV 92 82 - 98 fL    MCH 31 3 26 8 - 34 3 pg    MCHC 34 0 31 4 - 37 4 g/dL    RDW 12 8 11 6 - 15 1 %    Platelets 981 137 - 421 Thousands/uL    MPV 11 4 8 9 - 12 7 fL   Type and screen    Collection Time: 05/13/23 11:57 PM   Result Value Ref Range    ABO Grouping O     Rh Factor Positive     Antibody Screen Negative     Specimen Expiration Date [id number]    Comprehensive metabolic panel    Collection Time: 05/13/23 11:57 PM   Result Value Ref Range    Sodium 136 135 - 147 mmol/L    Potassium 3 7 3 5 - 5 3 mmol/L    Chloride 104 96 - 108 mmol/L    CO2 22 21 - 32 mmol/L    ANION GAP 10 4 - 13 mmol/L    BUN 10 5 - 25 mg/dL    Creatinine 0 74 0 60 - 1 30 mg/dL    Glucose 84 65 - 140 mg/dL    Calcium 9 4 8 4 - 10 2 mg/dL    AST 20 13 - 39 U/L    ALT 13 7 - 52 U/L    Alkaline Phosphatase 185 (H) 34 - 104 U/L    Total Protein 7 0 6 4 - 8 4 g/dL    Albumin 3 6 3 5 - 5 0 g/dL    Total Bilirubin 0 33 0 20 - 1 00 mg/dL    eGFR 108 ml/min/1 73sq m   Protein / creatinine ratio, urine    Collection Time: 05/13/23 11:57 PM   Result Value Ref Range    Creatinine, Ur 46 3 mg/dL    Protein Urine Random 5 mg/dL    Prot/Creat Ratio, Ur 0 11 (H) 0 00 - 0 10         Imaging, EKG, Pathology, and Other Studies: I have personally reviewed pertinent reports          Bakari Colón MD  OB/GYN, PGY-4  5/14/2023  5:27 AM

## 2023-05-14 NOTE — ASSESSMENT & PLAN NOTE
Systolic (79MEY), OWQ:462 , Min:127 , KXN:365   Diastolic (72GRW), VCE:17, Min:75, Max:83  No symptoms of preeclampsia or laboratory evidence to suggest this

## 2023-05-14 NOTE — ANESTHESIA PROCEDURE NOTES
Spinal Block    Patient location during procedure: OB  Start time: 5/14/2023 6:17 AM  Reason for block: primary anesthetic  Staffing  Performed: Anesthesiologist   Anesthesiologist: El Thompson DO  Preanesthetic Checklist  Completed: patient identified, IV checked, site marked, risks and benefits discussed, surgical consent, monitors and equipment checked, pre-op evaluation and timeout performed  Spinal Block  Patient position: sitting  Prep: Betadine  Patient monitoring: heart rate, cardiac monitor, continuous pulse ox and frequent blood pressure checks  Approach: midline  Location: L2-3  Injection technique: single-shot  Needle  Needle gauge: 24 G  Assessment  Sensory level: T4  Events: cerebrospinal fluid  Injection Assessment:  no paresthesia on injection, positive aspiration for clear CSF and negative aspiration for heme

## 2023-05-14 NOTE — ANESTHESIA POSTPROCEDURE EVALUATION
Post-Op Assessment Note    CV Status:  Stable    Pain management: adequate     Mental Status:  Alert and awake   Hydration Status:  Euvolemic   PONV Controlled:  Controlled   Airway Patency:  Patent      Post Op Vitals Reviewed: Yes      Staff: Anesthesiologist         No notable events documented      /72 (05/14/23 0745)    Temp      Pulse 61 (05/14/23 0745)   Resp 17 (05/14/23 0745)    SpO2 100 % (05/14/23 0745)

## 2023-05-14 NOTE — ASSESSMENT & PLAN NOTE
Meets criteria for medically indicated delivery (gestational HTN) ahead of scheduled elective primary section at 39 weeks gestation

## 2023-05-14 NOTE — DISCHARGE SUMMARY
Discharge Summary - OB/GYN   Santos Rutherford 33 y o  female MRN: [id number]  Unit/Bed#: L&D 311-01 Encounter: [id number]      Admission Date: 2023     Discharge Date: 2023    Admitting Diagnosis:   1  Pregnancy at 38w5d  2  Gestational hypertension  3  Suspected macrosomia  4  Latent labor  5  Rubella nonimmune    Discharge Diagnosis:   Same, delivered    Procedures: primary  section, low transverse incision    Admitting/DeliveryAttending: Nila Collet, MD  Discharge Attending: Uvaldo Ramey MD    Hospital Course:   Santos Rutherford is now a 33 y o  Wava Bogaert who was admitted at 38w5d for painful contractions and gestational hypertension  She was previously scheduled for primary elective  on May 16 for the suspicion of macrosomia  She had been counseled extensively on her options regarding route of delivery  She delivered a viable female  on May 14, 2023 at 5608  Weight 7lbs 6oz via primary  section, low transverse incision  Apgars were 9 (1 min) and 9 (5 min)   was transferred to  nursery  Patient tolerated the procedure well and was transferred to recovery in stable condition  Blood pressures remained well controlled without antihypertensives postpartum  Preoperative hemoglobin was 12 8g/dL, postoperative was 10 8g/dL  Her postoperative pain was well controlled with oral analgesics  On day of discharge, she was ambulating and able to reasonably perform all ADLs  She was voiding and had appropriate bowel function  Pain was well controlled  She was discharged home on post-operative day #3 without complications  Patient was instructed to follow up with her OB as an outpatient and was given appropriate warnings to call provider if she develops signs of infection or uncontrolled pain      Complications: none apparent    Condition at discharge: good     Discharge instructions/Information to patient and family:   See after visit summary for information provided to patient and family  Provisions for Follow-Up Care:  See after visit summary for information related to follow-up care and any pertinent home health orders  Disposition: Home    Planned Readmission: No    Discharge Medications: For a complete list of the patient's medications, please refer to her med rec  Vonda Orozco MD  PGY-III, OB/GYN  5/17/2023, 12:50 PM

## 2023-05-14 NOTE — ADDENDUM NOTE
Addendum  created 05/14/23 0805 by Henna Aguirre DO    Attestation recorded in [facility], [address] [facility] accepted, Claude 97 filed

## 2023-05-15 LAB
BASOPHILS # BLD AUTO: 0.05 THOUSANDS/ÂΜL (ref 0–0.1)
BASOPHILS NFR BLD AUTO: 0 % (ref 0–1)
EOSINOPHIL # BLD AUTO: 0.09 THOUSAND/ÂΜL (ref 0–0.61)
EOSINOPHIL NFR BLD AUTO: 1 % (ref 0–6)
ERYTHROCYTE [DISTWIDTH] IN BLOOD BY AUTOMATED COUNT: 13.2 % (ref 11.6–15.1)
HCT VFR BLD AUTO: 32.2 % (ref 34.8–46.1)
HGB BLD-MCNC: 10.8 G/DL (ref 11.5–15.4)
IMM GRANULOCYTES # BLD AUTO: 0.07 THOUSAND/UL (ref 0–0.2)
IMM GRANULOCYTES NFR BLD AUTO: 1 % (ref 0–2)
LYMPHOCYTES # BLD AUTO: 2.57 THOUSANDS/ÂΜL (ref 0.6–4.47)
LYMPHOCYTES NFR BLD AUTO: 23 % (ref 14–44)
MCH RBC QN AUTO: 31.2 PG (ref 26.8–34.3)
MCHC RBC AUTO-ENTMCNC: 33.5 G/DL (ref 31.4–37.4)
MCV RBC AUTO: 93 FL (ref 82–98)
MONOCYTES # BLD AUTO: 0.56 THOUSAND/ÂΜL (ref 0.17–1.22)
MONOCYTES NFR BLD AUTO: 5 % (ref 4–12)
NEUTROPHILS # BLD AUTO: 7.98 THOUSANDS/ÂΜL (ref 1.85–7.62)
NEUTS SEG NFR BLD AUTO: 70 % (ref 43–75)
NRBC BLD AUTO-RTO: 0 /100 WBCS
PLATELET # BLD AUTO: 143 THOUSANDS/UL (ref 149–390)
PMV BLD AUTO: 11.3 FL (ref 8.9–12.7)
RBC # BLD AUTO: 3.46 MILLION/UL (ref 3.81–5.12)
WBC # BLD AUTO: 11.32 THOUSAND/UL (ref 4.31–10.16)

## 2023-05-15 RX ORDER — VALACYCLOVIR HYDROCHLORIDE 500 MG/1
1000 TABLET, FILM COATED ORAL EVERY 24 HOURS
Status: DISCONTINUED | OUTPATIENT
Start: 2023-05-15 | End: 2023-05-17 | Stop reason: HOSPADM

## 2023-05-15 RX ORDER — VALACYCLOVIR HYDROCHLORIDE 500 MG/1
500 TABLET, FILM COATED ORAL EVERY 12 HOURS SCHEDULED
Status: DISCONTINUED | OUTPATIENT
Start: 2023-05-15 | End: 2023-05-15

## 2023-05-15 RX ADMIN — KETOROLAC TROMETHAMINE 30 MG: 30 INJECTION, SOLUTION INTRAMUSCULAR; INTRAVENOUS at 08:12

## 2023-05-15 RX ADMIN — DOCUSATE SODIUM 100 MG: 100 CAPSULE, LIQUID FILLED ORAL at 08:11

## 2023-05-15 RX ADMIN — ACETAMINOPHEN 650 MG: 325 TABLET ORAL at 05:15

## 2023-05-15 RX ADMIN — IBUPROFEN 600 MG: 600 TABLET, FILM COATED ORAL at 19:41

## 2023-05-15 RX ADMIN — ACETAMINOPHEN 650 MG: 325 TABLET ORAL at 17:40

## 2023-05-15 RX ADMIN — PRENATAL VIT W/ FE FUMARATE-FA TAB 27-0.8 MG 1 TABLET: 27-0.8 TAB at 08:11

## 2023-05-15 RX ADMIN — KETOROLAC TROMETHAMINE 30 MG: 30 INJECTION, SOLUTION INTRAMUSCULAR; INTRAVENOUS at 01:34

## 2023-05-15 RX ADMIN — KETOROLAC TROMETHAMINE 30 MG: 30 INJECTION, SOLUTION INTRAMUSCULAR; INTRAVENOUS at 13:28

## 2023-05-15 RX ADMIN — ACETAMINOPHEN 650 MG: 325 TABLET ORAL at 12:08

## 2023-05-15 RX ADMIN — ACETAMINOPHEN 650 MG: 325 TABLET ORAL at 23:25

## 2023-05-15 RX ADMIN — DOCUSATE SODIUM 100 MG: 100 CAPSULE, LIQUID FILLED ORAL at 17:40

## 2023-05-15 RX ADMIN — ENOXAPARIN SODIUM 40 MG: 100 INJECTION SUBCUTANEOUS at 08:12

## 2023-05-15 RX ADMIN — VALACYCLOVIR HYDROCHLORIDE 1000 MG: 500 TABLET, FILM COATED ORAL at 09:06

## 2023-05-15 NOTE — PROGRESS NOTES
Progress Note - OB/GYN   Montgomery Mate 33 y o  female MRN: [id number]  Unit/Bed#: L&D 311-01 Encounter: [id number]    Assessment:  Post partum Day #1 s/p 1LTCS for fetal macrosomia, stable, baby in room    Plan:  Rubella non-immune status, antepartum  Assessment & Plan  MMR postpartum    Gestational HTN  Assessment & Plan  Systolic (59ZXO), HSB:105 , Min:109 , OKC:674   Diastolic (47KKW), HRQ:86, Min:57, Max:92  Asymptomatic   PreE labs negative    Excessive fetal growth affecting management of pregnancy in third trimester  Assessment & Plan  Suspected fetal macrosomia of the fetus   EFW 92%ile on 4/10/23; counseled on risk of shoulder dystocia, etc  Previously by her OBGYN's and MFM  Risks/benefits of C/S reviewed  Patient desires to proceed with 1LTCS on admission as route of delivery     Iron deficiency anemia during pregnancy  Assessment & Plan  CBC on admission   Asymptomatic    Herpes simplex type 1 infection  Assessment & Plan  Hx oral HSV, no genital HSV  Taking Valtrex since 36 weeks gestation   No oral or genital lesions noted on admission   Planning for 1LTCS as route of delivery     Exercise-induced asthma  Assessment & Plan  Albuterol PRN    38 weeks gestation of pregnancy  Assessment & Plan  Meets criteria for medically indicated delivery (gestational HTN) ahead of scheduled elective primary section at 44 weeks gestation    * Status post primary low transverse  section  Assessment & Plan  QBL 404cc, Hgb 12 8 --> 10 8  [place] catheter removed, passed VT  DVT ppx: SCDs  Continue routine post partum care  Encourage ambulation  Encourage breastfeeding  Contraception: declined  Anticipate discharge POD#2 vs #3      Subjective/Objective   Chief Complaint:     Post delivery  Patient is doing well  Lochia WNL  Pain well controlled       Subjective:     Pain: yes, cramping, improved with meds  Tolerating PO: yes  Voiding: yes  Flatus: yes  Ambulating: yes  Chest pain: no  Shortness of breath: no  Leg "pain: no  Lochia: minimal    Objective:     Vitals: /79 (BP Location: Right arm)   Pulse 67   Temp (!) 97 2 °F (36 2 °C) (Temporal)   Resp 16   Ht 5' 5\" (1 651 m)   Wt 83 kg (183 lb)   LMP 08/16/2022 (Exact Date)   SpO2 98%   Breastfeeding Yes   BMI 30 45 kg/m²     I/O       05/13 0701 05/14 0700 05/14 0701  05/15 0700    I V  (mL/kg) 1000 (12) 700 (8 4)    Total Intake(mL/kg) 1000 (12) 700 (8 4)    Urine (mL/kg/hr)  6550 (3 3)    Blood  404    Total Output  6954    Net +1000 -4844                Lab Results   Component Value Date    WBC 11 32 (H) 05/15/2023    HGB 10 8 (L) 05/15/2023    HCT 32 2 (L) 05/15/2023    MCV 93 05/15/2023     (L) 05/15/2023       Physical Exam:     Gen: AAOx3, NAD  CV: no acute distress  Lungs: no acute distress  Abd: Soft, non-tender, non-distended, no rebound or guarding  Uterine fundus firm and non-tender, 1 cm below the umbilicus   Incision c/d/I  Ext: Non tender    [address]  MD DICK Loving PGY-2  5/15/2023  6:10 AM  "

## 2023-05-15 NOTE — UTILIZATION REVIEW
"NOTIFICATION OF INPATIENT ADMISSION   MATERNITY/DELIVERY AUTHORIZATION REQUEST   SERVICING FACILITY:   [address] [place] [address] - L&D, , NICU  [address] [place] [address], [place] [address] [address] [facility]  Tax ID: [id number]  NPI: [id number] ATTENDING PROVIDER:  Attending Name and NPI#: Sadie Flores Md [[id number]]  Address: [address] [place] [address]  Phone: [phone]     ADMISSION INFORMATION:  [facility]  Place of Service Code: 21  Inpatient Admission Date/Time: 23  1:38 AM  Discharge Date/Time: No discharge date for patient encounter  Admitting Diagnosis Code/Description:  Uterine contractions [O47 9]     Mother: Abhijeet Mar 1991 Estimated Date of Delivery: 23  Delivering clinician: Sadie Flores    OB History        1    Para   1    Term   1            AB        Living   1       SAB        IAB        Ectopic        Multiple   0    Live Births   1                Name & MRN:   Information for the patient's :  Naldo Meyer Girl eHrnán Dean) [[id number]]      Delivery Information:  Sex: female  Delivered 2023 6:44 AM by , Low Transverse; Gestational Age: 44w7d     Measurements:  Weight: 7 lb 6 2 oz (3350 g); Height: 20\"    APGAR 1 minute 5 minutes 10 minutes   Totals: 9 9       Birth Information: 33 y o  female MRN: [id number] Unit/Bed#: L&D 311-01   Birthweight: No birth weight on file  Gestational Age: <None> Delivery Type:    APGARS Totals:        UTILIZATION REVIEW CONTACT:  Kelsie Clemens Utilization   Network Utilization Review Department  Phone: [phone]  Fax [phone]  Email: [email or web address]  Contact for approvals/pending authorizations, clinical reviews, and discharge       PHYSICIAN ADVISORY SERVICES:  Medical Necessity Denial & Pcjt-vh-Svis Review  Phone: [phone]  Fax: [phone]  Email: " [email or web address]

## 2023-05-15 NOTE — LACTATION NOTE
This note was copied from a baby's chart  CONSULT - LACTATION  Baby Girl Ross Barker 1 days female MRN: [id number]    [address] [facility] NURSERY Room / Bed: L&D 311(N)/L&D 311(N) Encounter: [id number]    Maternal Information     MOTHER:  Marilia England  Maternal Age: 33 y o    OB History: # 1 - Date: 23, Sex: Female, Weight: 3350 g (7 lb 6 2 oz), GA: 38w5d, Delivery: , Low Transverse, Apgar1: 9, Apgar5: 9, Living: Living, Birth Comments: None   Previouse breast reduction surgery? No    Lactation history:   Has patient previously breast fed: No   How long had patient previously breast fed:     Previous breast feeding complications:       Past Surgical History:   Procedure Laterality Date   • ADENOIDECTOMY      Last Assessed: [id number]   • DENTAL SURGERY      Last Assessed: [id number]   • MYRINGOTOMY      Last Assessed: [id number]   • [facility]  DELIVERY ONLY N/A 2023    Procedure:  SECTION (); Surgeon: Bard Alexander MD;  Location: [facility];  Service: Obstetrics        Birth information:  YOB: 2022   Time of birth: 6:44 AM   Sex: female   Delivery type: , Low Transverse   Birth Weight: 3350 g (7 lb 6 2 oz)   Percent of Weight Change: -1%     Gestational Age: 44w7d   [unfilled]    Assessment     Breast and nipple assessment: normal assessment    [place] Assessment: normal assessment    Feeding assessment: feeding well with guidance    LATCH:  Latch: Grasps breast, tongue down, lips flanged, rhythmic sucking   Audible Swallowing: A few with stimulation   Type of Nipple: Everted (After stimulation)   Comfort (Breast/Nipple): Soft/non-tender   Hold (Positioning): Partial assist, teach one side, mother does other, staff holds   LATCH Score: 8          Feeding recommendations:  breast feed on demand     Met with mother & father   Provided  with Ready, Set, Baby booklet which contained information on:  Hand expression with access to QR codes to review hand expression  Positioning and latch reviewed as well as showing images of other feeding positions  Discussed the properties of a good latch in any position  Feeding on cue and what that means for recognizing infant's hunger, s/s that baby is getting enough milk and some s/s that breastfeeding dyad may need further help Mom wanted assistance feeding baby  Mom chose to start on right, encouraged football for teaching baby  Quickly guided dyad to deep latch  Stimulated to suck converting to rocker suckling  Nursed well till popped off with relaxed tone  Burped  Then placed at left breast also using football hold  Mom feels more comfortable on this side due to hand dominance  Gyaded Dyad to deep latch  Stimulated to keep rocker suckling with audible swallowing and breast softening till baby asleep at breast with relaxed tone  Parents not better suckling and are pleased with session  Mom noted less discomfort also  Skin to Skin contact an benefits to mom and baby  Avoidance of pacifiers for the first month discussed  Gave information on common concerns, what to expect the first few weeks after delivery, preparing for other caregivers, and how partners can help  Resources for support also provided  Also reviewed  discharge breastfeeding booklet including the feeding log  Emphasized 8 or more (12) feedings in a 24 hour period, what to expect for the number of diapers per day of life and the progression of properties of the  stooling pattern  Reviewed breastfeeding and your lifestyle, storage and preparation of breast milk, how to keep you breast pump clean, the employed breastfeeding mother and paced bottle feeding handouts  Booklet included Breastfeeding Resources for after discharge including access to the number for the [address]  Encouraged parents to call for assistance, questions, and concerns about breastfeeding  Extension provided          BODØ Nathalie Dillard RN 5/15/2023 3:29 PM

## 2023-05-15 NOTE — ASSESSMENT & PLAN NOTE
Meeting postoperative milestones   Continue routine postoperative/postpartum care  VTE prophylaxis with Lovenox 40mg daily   Encourage ambulation  Encourage breastfeeding

## 2023-05-16 ENCOUNTER — ANESTHESIA (INPATIENT)
Dept: LABOR AND DELIVERY | Facility: HOSPITAL | Age: 33
End: 2023-05-16

## 2023-05-16 RX ORDER — METOCLOPRAMIDE HYDROCHLORIDE 5 MG/ML
10 INJECTION INTRAMUSCULAR; INTRAVENOUS EVERY 6 HOURS PRN
Status: DISCONTINUED | OUTPATIENT
Start: 2023-05-16 | End: 2023-05-17 | Stop reason: HOSPADM

## 2023-05-16 RX ADMIN — ENOXAPARIN SODIUM 40 MG: 100 INJECTION SUBCUTANEOUS at 09:31

## 2023-05-16 RX ADMIN — OXYCODONE HYDROCHLORIDE 5 MG: 5 TABLET ORAL at 13:42

## 2023-05-16 RX ADMIN — IBUPROFEN 600 MG: 600 TABLET, FILM COATED ORAL at 22:20

## 2023-05-16 RX ADMIN — PRENATAL VIT W/ FE FUMARATE-FA TAB 27-0.8 MG 1 TABLET: 27-0.8 TAB at 09:30

## 2023-05-16 RX ADMIN — DOCUSATE SODIUM 100 MG: 100 CAPSULE, LIQUID FILLED ORAL at 17:41

## 2023-05-16 RX ADMIN — DOCUSATE SODIUM 100 MG: 100 CAPSULE, LIQUID FILLED ORAL at 09:30

## 2023-05-16 RX ADMIN — ACETAMINOPHEN 650 MG: 325 TABLET ORAL at 17:41

## 2023-05-16 RX ADMIN — MEASLES, MUMPS, AND RUBELLA VIRUS VACCINE LIVE 0.5 ML: 1000; 12500; 1000 INJECTION, POWDER, LYOPHILIZED, FOR SUSPENSION SUBCUTANEOUS at 14:53

## 2023-05-16 RX ADMIN — VALACYCLOVIR HYDROCHLORIDE 1000 MG: 500 TABLET, FILM COATED ORAL at 09:30

## 2023-05-16 RX ADMIN — ACETAMINOPHEN 650 MG: 325 TABLET ORAL at 12:25

## 2023-05-16 RX ADMIN — OXYCODONE HYDROCHLORIDE 5 MG: 5 TABLET ORAL at 01:02

## 2023-05-16 RX ADMIN — IBUPROFEN 600 MG: 600 TABLET, FILM COATED ORAL at 16:21

## 2023-05-16 RX ADMIN — IBUPROFEN 600 MG: 600 TABLET, FILM COATED ORAL at 09:30

## 2023-05-16 RX ADMIN — IBUPROFEN 600 MG: 600 TABLET, FILM COATED ORAL at 03:10

## 2023-05-16 RX ADMIN — ACETAMINOPHEN 650 MG: 325 TABLET ORAL at 05:46

## 2023-05-16 NOTE — LACTATION NOTE
This note was copied from a baby's chart  CONSULT - LACTATION  Baby Girl Felicitas Barker 2 days female MRN: [id number]    [address] NURSERY Room / Bed: L&D 311(N)/L&D 311(N) Encounter: [id number]    Maternal Information     MOTHER:  Alexia Bates  Maternal Age: 33 y o    OB History: # 1 - Date: 23, Sex: Female, Weight: 3350 g (7 lb 6 2 oz), GA: 38w5d, Delivery: , Low Transverse, Apgar1: 9, Apgar5: 9, Living: Living, Birth Comments: None   Previouse breast reduction surgery? No    Lactation history:   Has patient previously breast fed: No   How long had patient previously breast fed:     Previous breast feeding complications:       Past Surgical History:   Procedure Laterality Date   • ADENOIDECTOMY      Last Assessed: [id number]   • DENTAL SURGERY      Last Assessed: [id number]   • MYRINGOTOMY      Last Assessed: [id number]   • [place]  DELIVERY ONLY N/A 2023    Procedure:  SECTION (); Surgeon: Shayna Fonseca MD;  Location: [facility];  Service: Obstetrics        Birth information:  YOB: 2022   Time of birth: 6:44 AM   Sex: female   Delivery type: , Low Transverse   Birth Weight: 3350 g (7 lb 6 2 oz)   Percent of Weight Change: -7%     Gestational Age: 44w7d   [unfilled]    Assessment     Breast and nipple assessment: sore nipple and cracked nipples    [place] Assessment: normal assessment    Feeding assessment: feeding well  LATCH:  Latch: Grasps breast, tongue down, lips flanged, rhythmic sucking   Audible Swallowing: Spontaneous and intermittent (24 hours old)   Type of Nipple: Everted (After stimulation)   Comfort (Breast/Nipple): Engorged, cracked, bleeding, large blisters or bruises   Hold (Positioning): Partial assist, teach one side, mother does other, staff holds   LATCH Score: 7         Having latch problems?  No   Position(s) Used Laid Back;Cross Cradle   Breasts/Nipples   Left Breast Filling   Right Breast Filling   Left Nipple Everted;Sore;Cracked   Right Nipple Everted;Tender;Sore;Cracked   Intervention Hand expression  (Effective)   Breastfeeding Status Yes   Breastfeeding Progress Not yet established;Breastfeeding well   Patient Follow-Up   Lactation Consult Status 2   Follow-Up Type Inpatient;Call as needed   Other OB Lactation Documentation    Additional Problem Noted Called to room for assistance with latching infant to the breast   Jazmin Aguillon did latch well with improved alignment  HE is highly effective  Nipples are cracked with traumas  Discussed occlusive dressings  Ozzieus Dovetle would like to continue with lanolin instead  Feeding recommendations:  breast feed on demand    Reviewed how to bring baby to the breast so that her lower lip and chin touch the breast with her nose just above the nipple to encourage a wider, more asymmetric latch  Information on hand expression given  Discussed benefits of knowing how to manually express breast including stimulating milk supply, softening nipple for latch and evacuating breast in the event of engorgement  Encouraged parents to call for assistance, questions, and concerns about breastfeeding  Extension provided      Dominguez Wallis RN 5/16/2023 2:29 PM

## 2023-05-16 NOTE — PLAN OF CARE
Problem: POSTPARTUM  Goal: Experiences normal postpartum course  Description: INTERVENTIONS:  - Monitor maternal vital signs  - Assess uterine involution and lochia  2023 by Luc Santoyo RN  Outcome: Progressing  2023 by Luc Santoyo RN  Outcome: Progressing  Goal: Appropriate maternal -  bonding  Description: INTERVENTIONS:  - Identify family support  - Assess for appropriate maternal/infant bonding   -Encourage maternal/infant bonding opportunities  - Referral to  or  as needed  2023 by Luc Santoyo RN  Outcome: Progressing  2023 by Luc Santoyo RN  Outcome: Progressing  Goal: Establishment of infant feeding pattern  Description: INTERVENTIONS:  - Assess breast/bottle feeding  - Refer to lactation as needed  2023 by Luc Santoyo RN  Outcome: Progressing  2023 by Luc Santoyo RN  Outcome: Progressing  Goal: Incision(s), wounds(s) or drain site(s) healing without S/S of infection  Description: INTERVENTIONS  - Assess and document dressing, incision, wound bed, drain sites and surrounding tissue  - Provide patient and family education  - Perform skin care  2023 by Luc Santoyo RN  Outcome: Progressing  2023 by Luc Santoyo RN  Outcome: Progressing     Problem: PAIN - ADULT  Goal: Verbalizes/displays adequate comfort level or baseline comfort level  Description: Interventions:  - Encourage patient to monitor pain and request assistance  - Assess pain using appropriate pain scale  - Administer analgesics based on type and severity of pain and evaluate response  - Implement non-pharmacological measures as appropriate and evaluate response  - Consider cultural and social influences on pain and pain management  - Notify physician/advanced practitioner if interventions unsuccessful or patient reports new pain  2023 by Luc Santoyo RN  Outcome: Progressing  2023 by Omar Hill RN  Outcome: Progressing     Problem: INFECTION - ADULT  Goal: Absence or prevention of progression during hospitalization  Description: INTERVENTIONS:  - Assess and monitor for signs and symptoms of infection  - Monitor lab/diagnostic results  - Monitor all insertion sites, i e  indwelling lines, tubes, and drains  - Monitor endotracheal if appropriate and nasal secretions for changes in amount and color  - [place] appropriate cooling/warming therapies per order  - Administer medications as ordered  - Instruct and encourage patient and family to use good hand hygiene technique  - Identify and instruct in appropriate isolation precautions for identified infection/condition  5/16/2023 0844 by Omar Hill RN  Outcome: Progressing  5/16/2023 0843 by Omar Hlil RN  Outcome: Progressing  Goal: Absence of fever/infection during neutropenic period  Description: INTERVENTIONS:  - Monitor WBC    5/16/2023 0844 by Omar Hill RN  Outcome: Progressing  5/16/2023 0843 by Omar Hill RN  Outcome: Progressing     Problem: SAFETY ADULT  Goal: Patient will remain free of falls  Description: INTERVENTIONS:  - Keep Call bell within reach  - Keep bed low and locked with side rails adjusted as appropriate  - Keep care items and personal belongings within reach  - Initiate and maintain comfort rounds    5/16/2023 0844 by Omar Hill RN  Outcome: Progressing  5/16/2023 0843 by Omar Hill RN  Outcome: Progressing  Goal: Maintain or return to baseline ADL function  Description: INTERVENTIONS:  - Provide patient education as appropriate  5/16/2023 0844 by Omar Hill RN  Outcome: Progressing  5/16/2023 0843 by Omar Hill RN  Outcome: Progressing  Goal: Maintains/Returns to pre admission functional level  Description: INTERVENTIONS:    - Record patient progress and toleration of activity level   5/16/2023 0844 by Omar Hill RN  Outcome: Progressing  5/16/2023 0843 by Omar Hill RN  Outcome: Progressing     Problem: Knowledge Deficit  Goal: Patient/family/caregiver demonstrates understanding of disease process, treatment plan, medications, and discharge instructions  Description: Complete learning assessment and assess knowledge base    Interventions:  - Provide teaching at level of understanding  - Provide teaching via preferred learning methods  5/16/2023 0844 by Haile Zapata RN  Outcome: Progressing  5/16/2023 0843 by Haile Zapata RN  Outcome: Progressing     Problem: DISCHARGE PLANNING  Goal: Discharge to home or other facility with appropriate resources  Description: INTERVENTIONS:  - Identify barriers to discharge w/patient and caregiver  - Arrange for needed discharge resources and transportation as appropriate  - Identify discharge learning needs (meds, wound care, etc )  - Arrange for interpretive services to assist at discharge as needed  - Refer to Case Management Department for coordinating discharge planning if the patient needs post-hospital services based on physician/advanced practitioner order or complex needs related to functional status, cognitive ability, or social support system  5/16/2023 0844 by Haile Zapata RN  Outcome: Progressing  5/16/2023 0843 by Haile Zapata RN  Outcome: Progressing

## 2023-05-16 NOTE — PROGRESS NOTES
Obstetrics Progress Note  Nanci Sousa 33 y o  female MRN: [id number]  Unit/Bed#: L&D 311-01 Encounter: [id number]    Assessment/Plan:  Postoperative day #2 s/p primary low transverse  delivery  Stable  Baby in room  By issue:  * Status post primary low transverse  section  Assessment & Plan  Meeting postoperative milestones  Continue routine postoperative/postpartum care  VTE prophylaxis with Lovenox 40mg daily   Encourage ambulation  Encourage breastfeeding      Gestational HTN  Assessment & Plan  Systolic (23SWR), TCP:434 , Min:119 , JAT:605   Diastolic (32YMG), RXT:80, Min:64, Max:85  No symptoms of preeclampsia or laboratory evidence to suggest this    Exercise-induced asthma  Assessment & Plan  Albuterol as needed    Rubella non-immune status, antepartum  Assessment & Plan  MMR vaccine ordered and available if patient interested    Herpes simplex type 1 infection  Assessment & Plan  On Valtrex suppression  No lesions or prodromal symptoms    Excessive fetal growth affecting management of pregnancy in third trimester  Assessment & Plan  's birth weight was 3350g    Iron deficiency anemia during pregnancy  Assessment & Plan  CBC on admission   Asymptomatic      Anticipate discharge postop day 3  Subjective/Objective   Chief Complaint:   Post delivery  Subjective:   Pain: controlled with oral analgesia  Tolerating PO: yes  Voiding: yes  Flatus: yes  Bowel Movement: no  Ambulating: yes  Chest pain: no  Shortness of breath: no  Leg pain: no  Lochia: within normal limits   Infant feeding: breast     Objective:   Vitals:   Temp:  [97 4 °F (36 3 °C)-98 6 °F (37 °C)] 97 4 °F (36 3 °C)  HR:  [61-80] 70  Resp:  [18] 18  BP: (119-135)/(64-85) 131/67     Intake/Output Summary (Last 24 hours) at 2023 0636  Last data filed at 5/15/2023 1801  Gross per 24 hour   Intake --   Output 1800 ml   Net -1800 ml       Physical Exam:   -General: alert and oriented x 3, in no apparent distress  -Cardiovascular: regular rate   -Pulmonary: normal effort  -Abdomen/Pelvis: soft, non-tender, non-distended, no rebound or guarding  Uterine fundus firm and non-tender, at the umbilicus  Incision: intact with steri strips but moist  -Extremities: Nontender, no edema    Lab Results   Component Value Date    WBC 11 32 (H) 05/15/2023    HGB 10 8 (L) 05/15/2023    HCT 32 2 (L) 05/15/2023    MCV 93 05/15/2023     (L) 05/15/2023         Vonda Contreras MD  PGY-III, OBGYN  5/16/2023, 6:36 AM

## 2023-05-16 NOTE — PLAN OF CARE
Problem: POSTPARTUM  Goal: Experiences normal postpartum course  Description: INTERVENTIONS:  - Monitor maternal vital signs  - Assess uterine involution and lochia  2023 by Radha Ace RN  Outcome: Progressing  2023 by Radha Ace RN  Outcome: Progressing  Goal: Appropriate maternal -  bonding  Description: INTERVENTIONS:  - Identify family support  - Assess for appropriate maternal/infant bonding   -Encourage maternal/infant bonding opportunities  - Referral to  or  as needed  2023 by Radha Ace RN  Outcome: Progressing  2023 by Radha Ace RN  Outcome: Progressing  Goal: Establishment of infant feeding pattern  Description: INTERVENTIONS:  - Assess breast/bottle feeding  - Refer to lactation as needed  2023 by Radha Ace RN  Outcome: Progressing  2023 by Radha Ace RN  Outcome: Progressing  Goal: Incision(s), wounds(s) or drain site(s) healing without S/S of infection  Description: INTERVENTIONS  - Assess and document dressing, incision, wound bed, drain sites and surrounding tissue  - Provide patient and family education  - Perform skin care  2023 by Radha Ace RN  Outcome: Progressing  2023 by Radha Ace RN  Outcome: Progressing     Problem: PAIN - ADULT  Goal: Verbalizes/displays adequate comfort level or baseline comfort level  Description: Interventions:  - Encourage patient to monitor pain and request assistance  - Assess pain using appropriate pain scale  - Administer analgesics based on type and severity of pain and evaluate response  - Implement non-pharmacological measures as appropriate and evaluate response  - Consider cultural and social influences on pain and pain management  - Notify physician/advanced practitioner if interventions unsuccessful or patient reports new pain  Outcome: Progressing     Problem: INFECTION - ADULT  Goal: Absence or prevention of progression during hospitalization  Description: INTERVENTIONS:  - Assess and monitor for signs and symptoms of infection  - Monitor lab/diagnostic results  - Monitor all insertion sites, i e  indwelling lines, tubes, and drains  - Monitor endotracheal if appropriate and nasal secretions for changes in amount and color  - [place] appropriate cooling/warming therapies per order  - Administer medications as ordered  - Instruct and encourage patient and family to use good hand hygiene technique  - Identify and instruct in appropriate isolation precautions for identified infection/condition  Outcome: Progressing  Goal: Absence of fever/infection during neutropenic period  Description: INTERVENTIONS:  - Monitor WBC    Outcome: Progressing     Problem: SAFETY ADULT  Goal: Patient will remain free of falls  Description: INTERVENTIONS:  - Educate patient/family on patient safety including physical limitations  - Instruct patient to call for assistance with activity   - Consult OT/PT to assist with strengthening/mobility   - Keep Call bell within reach  - Keep bed low and locked with side rails adjusted as appropriate  - Keep care items and personal belongings within reach  - Initiate and maintain comfort rounds  - Make Fall Risk Sign visible to staff  - Apply yellow socks and bracelet for high fall risk patients  - Consider moving patient to room near nurses station  Outcome: Progressing  Goal: Maintain or return to baseline ADL function  Description: INTERVENTIONS:  -  Assess patient's ability to carry out ADLs; assess patient's baseline for ADL function and identify physical deficits which impact ability to perform ADLs (bathing, care of mouth/teeth, toileting, grooming, dressing, etc )  - Assess/evaluate cause of self-care deficits   - Assess range of motion  - Assess patient's mobility; develop plan if impaired  - Assess patient's need for assistive devices and provide as appropriate  - Encourage maximum independence but intervene and supervise when necessary  - Involve family in performance of ADLs  - Assess for home care needs following discharge   - Consider OT consult to assist with ADL evaluation and planning for discharge  - Provide patient education as appropriate  Outcome: Progressing  Goal: Maintains/Returns to pre admission functional level  Description: INTERVENTIONS:  - Perform BMAT or MOVE assessment daily    - Set and communicate daily mobility goal to care team and patient/family/caregiver  - Collaborate with rehabilitation services on mobility goals if consulted  - Out of bed for toileting  - Record patient progress and toleration of activity level   Outcome: Progressing     Problem: Knowledge Deficit  Goal: Patient/family/caregiver demonstrates understanding of disease process, treatment plan, medications, and discharge instructions  Description: Complete learning assessment and assess knowledge base    Interventions:  - Provide teaching at level of understanding  - Provide teaching via preferred learning methods  Outcome: Progressing     Problem: DISCHARGE PLANNING  Goal: Discharge to home or other facility with appropriate resources  Description: INTERVENTIONS:  - Identify barriers to discharge w/patient and caregiver  - Arrange for needed discharge resources and transportation as appropriate  - Identify discharge learning needs (meds, wound care, etc )  - Arrange for interpretive services to assist at discharge as needed  - Refer to Case Management Department for coordinating discharge planning if the patient needs post-hospital services based on physician/advanced practitioner order or complex needs related to functional status, cognitive ability, or social support system  Outcome: Progressing

## 2023-05-17 VITALS
RESPIRATION RATE: 18 BRPM | HEIGHT: 65 IN | WEIGHT: 183 LBS | BODY MASS INDEX: 30.49 KG/M2 | TEMPERATURE: 98 F | SYSTOLIC BLOOD PRESSURE: 124 MMHG | DIASTOLIC BLOOD PRESSURE: 78 MMHG | HEART RATE: 72 BPM | OXYGEN SATURATION: 98 %

## 2023-05-17 RX ORDER — IBUPROFEN 600 MG/1
600 TABLET ORAL EVERY 6 HOURS PRN
Qty: 40 TABLET | Refills: 0 | Status: SHIPPED | OUTPATIENT
Start: 2023-05-17

## 2023-05-17 RX ORDER — DIPHENHYDRAMINE HCL 25 MG
25 TABLET ORAL EVERY 6 HOURS PRN
Status: DISCONTINUED | OUTPATIENT
Start: 2023-05-17 | End: 2023-05-17 | Stop reason: HOSPADM

## 2023-05-17 RX ORDER — FERROUS SULFATE TAB EC 324 MG (65 MG FE EQUIVALENT) 324 (65 FE) MG
324 TABLET DELAYED RESPONSE ORAL
Qty: 90 TABLET | Refills: 0 | Status: SHIPPED | OUTPATIENT
Start: 2023-05-17 | End: 2023-05-17 | Stop reason: SDUPTHER

## 2023-05-17 RX ORDER — ACETAMINOPHEN 325 MG/1
650 TABLET ORAL EVERY 6 HOURS PRN
Qty: 30 TABLET | Refills: 0 | Status: SHIPPED | OUTPATIENT
Start: 2023-05-17 | End: 2023-05-17 | Stop reason: SDUPTHER

## 2023-05-17 RX ORDER — ACETAMINOPHEN 325 MG/1
650 TABLET ORAL EVERY 6 HOURS PRN
Qty: 40 TABLET | Refills: 0 | Status: SHIPPED | OUTPATIENT
Start: 2023-05-17 | End: 2023-05-24

## 2023-05-17 RX ORDER — OXYCODONE HYDROCHLORIDE 5 MG/1
5 TABLET ORAL EVERY 4 HOURS PRN
Qty: 15 TABLET | Refills: 0 | Status: SHIPPED | OUTPATIENT
Start: 2023-05-17 | End: 2023-05-17 | Stop reason: SDUPTHER

## 2023-05-17 RX ORDER — IBUPROFEN 600 MG/1
600 TABLET ORAL EVERY 6 HOURS PRN
Qty: 30 TABLET | Refills: 0 | Status: SHIPPED | OUTPATIENT
Start: 2023-05-17 | End: 2023-05-17 | Stop reason: SDUPTHER

## 2023-05-17 RX ORDER — FERROUS SULFATE TAB EC 324 MG (65 MG FE EQUIVALENT) 324 (65 FE) MG
324 TABLET DELAYED RESPONSE ORAL
Qty: 30 TABLET | Refills: 6 | Status: SHIPPED | OUTPATIENT
Start: 2023-05-17

## 2023-05-17 RX ORDER — OXYCODONE HYDROCHLORIDE 5 MG/1
5 TABLET ORAL EVERY 4 HOURS PRN
Qty: 15 TABLET | Refills: 0 | Status: SHIPPED | OUTPATIENT
Start: 2023-05-17 | End: 2023-05-27

## 2023-05-17 RX ADMIN — ACETAMINOPHEN 650 MG: 325 TABLET ORAL at 11:41

## 2023-05-17 RX ADMIN — ENOXAPARIN SODIUM 40 MG: 100 INJECTION SUBCUTANEOUS at 09:30

## 2023-05-17 RX ADMIN — ACETAMINOPHEN 650 MG: 325 TABLET ORAL at 06:07

## 2023-05-17 RX ADMIN — VALACYCLOVIR HYDROCHLORIDE 1000 MG: 500 TABLET, FILM COATED ORAL at 09:30

## 2023-05-17 RX ADMIN — OXYCODONE HYDROCHLORIDE 5 MG: 5 TABLET ORAL at 00:04

## 2023-05-17 RX ADMIN — PRENATAL VIT W/ FE FUMARATE-FA TAB 27-0.8 MG 1 TABLET: 27-0.8 TAB at 09:30

## 2023-05-17 RX ADMIN — IBUPROFEN 600 MG: 600 TABLET, FILM COATED ORAL at 03:46

## 2023-05-17 RX ADMIN — SIMETHICONE 80 MG: 80 TABLET, CHEWABLE ORAL at 09:30

## 2023-05-17 RX ADMIN — IBUPROFEN 600 MG: 600 TABLET, FILM COATED ORAL at 09:29

## 2023-05-17 RX ADMIN — DOCUSATE SODIUM 100 MG: 100 CAPSULE, LIQUID FILLED ORAL at 09:30

## 2023-05-17 NOTE — PROGRESS NOTES
All belongings accounted for by patient and S O  before leaving  Discharge instreuctions given and reviewed, questions answered  Patient chose to walk from unit escorted by S O  carrying infant secured in car seat, and [facility]

## 2023-05-17 NOTE — LACTATION NOTE
This note was copied from a baby's chart  CONSULT - LACTATION  Baby Girl Stephanie Barker 3 days female MRN: [id number]    [address] [facility] NURSERY Room / Bed: L&D 311(N)/L&D 311(N) Encounter: [id number]    Maternal Information     MOTHER:  Mahad Damonman  Maternal Age: 33 y o    OB History: # 1 - Date: 23, Sex: Female, Weight: 3350 g (7 lb 6 2 oz), GA: 38w5d, Delivery: , Low Transverse, Apgar1: 9, Apgar5: 9, Living: Living, Birth Comments: None   Previouse breast reduction surgery? No    Lactation history:   Has patient previously breast fed: No   How long had patient previously breast fed:     Previous breast feeding complications:       Past Surgical History:   Procedure Laterality Date   • ADENOIDECTOMY      Last Assessed: [id number]   • DENTAL SURGERY      Last Assessed: [id number]   • MYRINGOTOMY      Last Assessed: [id number]   • [place]  DELIVERY ONLY N/A 2023    Procedure:  SECTION (); Surgeon: Erika Anne MD;  Location: [facility];  Service: Obstetrics        Birth information:  YOB: 2022   Time of birth: 6:44 AM   Sex: female   Delivery type: , Low Transverse   Birth Weight: 3350 g (7 lb 6 2 oz)   Percent of Weight Change: -7%     Gestational Age: 44w7d   [unfilled]    Assessment     Breast and nipple assessment: normal assessment    [place] Assessment: normal assessment    Feeding assessment: feeding well  LATCH:  Latch: Grasps breast, tongue down, lips flanged, rhythmic sucking   Audible Swallowing: Spontaneous and intermittent (24 hours old)   Type of Nipple: Everted (After stimulation)   Comfort (Breast/Nipple): Filling, red/small blisters/bruises, mild/moderate discomfort   Hold (Positioning): No assist from staff, mother able to position/hold infant   LATCH Score: 9         Having latch problems?  No   Position(s) Used Laid Back;Cradle   Breasts/Nipples   Left Breast Filling   Right Breast Filling   Left Nipple Everted   Right Nipple Everted   Intervention Hand expression   Breastfeeding Status Yes   Breastfeeding Progress Breastfeeding well   Patient Follow-Up   Lactation Consult Status 2   Follow-Up Type Inpatient;Call as needed   Other OB Lactation Documentation    Additional Problem Noted Called to room by family looking for assistance with breastfeeding prior to going home today  Tang Davenport appears more relaxed and confident with breastfeeding  Feeding recommendations:  breast feed on demand     Reviewed how to bring baby to the breast so that her lower lip and chin touch the breast with her nose just above the nipple to encourage a wider, more asymmetric latch  Encouraged parents to call for assistance, questions, and concerns about breastfeeding  Extension provided        Jacqueline Rasheed RN 5/17/2023 11:59 AM

## 2023-05-17 NOTE — PROGRESS NOTES
Obstetrics Progress Note  Iveth Ruano 33 y o  female MRN: [id number]  Unit/Bed#: L&D 311-01 Encounter: [id number]    Assessment/Plan:  Postoperative day #3 s/p primary low transverse  delivery  Stable  Baby in room  By issue:  * Status post primary low transverse  section  Assessment & Plan  Meeting postoperative milestones  Continue routine postoperative/postpartum care  VTE prophylaxis with Lovenox 40mg daily   Encourage ambulation  Encourage breastfeeding    Gestational HTN  Assessment & Plan  Systolic (42KAZ), XEL:674 , Min:127 , DIF:829   Diastolic (74MMZ), QUINTON:41, Min:75, Max:83  No symptoms of preeclampsia or laboratory evidence to suggest this    Exercise-induced asthma  Assessment & Plan  Albuterol as needed    Rubella non-immune status, antepartum  Assessment & Plan  MMR vaccine ordered and available if patient interested    Herpes simplex type 1 infection  Assessment & Plan  On Valtrex suppression  No lesions or prodromal symptoms    Excessive fetal growth affecting management of pregnancy in third trimester  Assessment & Plan  's birth weight was 3350g    Iron deficiency anemia during pregnancy  Assessment & Plan  Continue iron supplementation on discharge    Anticipate discharge today  Subjective/Objective   Chief Complaint:   Post delivery  Subjective:   Pain: controlled  Tolerating PO: yes  Voiding: yes  Flatus: yes  Bowel Movement: no  Ambulating: yes  Chest pain: no  Shortness of breath: no  Leg pain: no  Lochia: within normal limits   Infant feeding: breast     Objective:   Vitals:   Temp:  [97 7 °F (36 5 °C)-98 °F (36 7 °C)] 97 9 °F (36 6 °C)  HR:  [71-85] 71  Resp:  [16-20] 18  BP: (127-132)/(75-83) 129/82   No intake or output data in the 24 hours ending 23 0540    Physical Exam:   -General: alert and oriented x 3, in no apparent distress  -Cardiovascular: regular rate   -Pulmonary: normal effort  -Abdomen/Pelvis: soft, non-tender, non-distended, no rebound or guarding  Uterine fundus firm and non-tender, at the umbilicus  Incision: intact; soiled steri strips in place  -Extremities: Nontender    Lab Results   Component Value Date    WBC 11 32 (H) 05/15/2023    HGB 10 8 (L) 05/15/2023    HCT 32 2 (L) 05/15/2023    MCV 93 05/15/2023     (L) 05/15/2023         Vonda Galdamez MD  PGY-III, OBGYN  5/17/2023, 5:40 AM

## 2023-05-17 NOTE — PLAN OF CARE
Problem: POSTPARTUM  Goal: Experiences normal postpartum course  Description: INTERVENTIONS:  - Monitor maternal vital signs  - Assess uterine involution and lochia  Outcome: Progressing  Goal: Appropriate maternal -  bonding  Description: INTERVENTIONS:  - Identify family support  - Assess for appropriate maternal/infant bonding   -Encourage maternal/infant bonding opportunities  - Referral to  or  as needed  Outcome: Progressing  Goal: Establishment of infant feeding pattern  Description: INTERVENTIONS:  - Assess breast/bottle feeding  - Refer to lactation as needed  Outcome: Progressing  Goal: Incision(s), wounds(s) or drain site(s) healing without S/S of infection  Description: INTERVENTIONS  - Assess and document dressing, incision, wound bed, drain sites and surrounding tissue  - Provide patient and family education  - Perform skin care  Outcome: Progressing     Problem: PAIN - ADULT  Goal: Verbalizes/displays adequate comfort level or baseline comfort level  Description: Interventions:  - Encourage patient to monitor pain and request assistance  - Assess pain using appropriate pain scale  - Administer analgesics based on type and severity of pain and evaluate response  - Implement non-pharmacological measures as appropriate and evaluate response  - Consider cultural and social influences on pain and pain management  - Notify physician/advanced practitioner if interventions unsuccessful or patient reports new pain  Outcome: Progressing     Problem: INFECTION - ADULT  Goal: Absence or prevention of progression during hospitalization  Description: INTERVENTIONS:  - Assess and monitor for signs and symptoms of infection  - Monitor lab/diagnostic results  - Monitor all insertion sites, i e  indwelling lines, tubes, and drains  - Monitor endotracheal if appropriate and nasal secretions for changes in amount and color  - [place] appropriate cooling/warming therapies per order  - Administer medications as ordered  - Instruct and encourage patient and family to use good hand hygiene technique  - Identify and instruct in appropriate isolation precautions for identified infection/condition  Outcome: Progressing  Goal: Absence of fever/infection during neutropenic period  Description: INTERVENTIONS:  - Monitor WBC    Outcome: Progressing     Problem: SAFETY ADULT  Goal: Patient will remain free of falls  Description: INTERVENTIONS:  - Keep Call bell within reach  - Keep bed low and locked with side rails adjusted as appropriate  - Keep care items and personal belongings within reach  - Initiate and maintain comfort rounds    Outcome: Progressing  Goal: Maintain or return to baseline ADL function  Description: INTERVENTIONS:  - Provide patient education as appropriate  Outcome: Progressing  Goal: Maintains/Returns to pre admission functional level  Description: INTERVENTIONS:    - Record patient progress and toleration of activity level   Outcome: Progressing     Problem: Knowledge Deficit  Goal: Patient/family/caregiver demonstrates understanding of disease process, treatment plan, medications, and discharge instructions  Description: Complete learning assessment and assess knowledge base    Interventions:  - Provide teaching at level of understanding  - Provide teaching via preferred learning methods  Outcome: Progressing     Problem: DISCHARGE PLANNING  Goal: Discharge to home or other facility with appropriate resources  Description: INTERVENTIONS:  - Identify barriers to discharge w/patient and caregiver  - Arrange for needed discharge resources and transportation as appropriate  - Identify discharge learning needs (meds, wound care, etc )  - Arrange for interpretive services to assist at discharge as needed  - Refer to Case Management Department for coordinating discharge planning if the patient needs post-hospital services based on physician/advanced practitioner order or complex needs related to functional status, cognitive ability, or social support system  Outcome: Progressing

## 2023-05-17 NOTE — PLAN OF CARE
Problem: POSTPARTUM  Goal: Experiences normal postpartum course  Description: INTERVENTIONS:  - Monitor maternal vital signs  - Assess uterine involution and lochia  Outcome: Progressing  Goal: Appropriate maternal -  bonding  Description: INTERVENTIONS:  - Identify family support  - Assess for appropriate maternal/infant bonding   -Encourage maternal/infant bonding opportunities  - Referral to  or  as needed  Outcome: Progressing  Goal: Establishment of infant feeding pattern  Description: INTERVENTIONS:  - Assess breast/bottle feeding  - Refer to lactation as needed  Outcome: Progressing  Goal: Incision(s), wounds(s) or drain site(s) healing without S/S of infection  Description: INTERVENTIONS  - Assess and document dressing, incision, wound bed, drain sites and surrounding tissue  - Provide patient and family education  - Perform skin care  Outcome: Progressing     Problem: PAIN - ADULT  Goal: Verbalizes/displays adequate comfort level or baseline comfort level  Description: Interventions:  - Encourage patient to monitor pain and request assistance  - Assess pain using appropriate pain scale  - Administer analgesics based on type and severity of pain and evaluate response  - Implement non-pharmacological measures as appropriate and evaluate response  - Consider cultural and social influences on pain and pain management  - Notify physician/advanced practitioner if interventions unsuccessful or patient reports new pain  Outcome: Progressing     Problem: INFECTION - ADULT  Goal: Absence or prevention of progression during hospitalization  Description: INTERVENTIONS:  - Assess and monitor for signs and symptoms of infection  - Monitor lab/diagnostic results  - Monitor all insertion sites, i e  indwelling lines, tubes, and drains  - Monitor endotracheal if appropriate and nasal secretions for changes in amount and color  - [place] appropriate cooling/warming therapies per order  - Administer medications as ordered  - Instruct and encourage patient and family to use good hand hygiene technique  - Identify and instruct in appropriate isolation precautions for identified infection/condition  Outcome: Progressing  Goal: Absence of fever/infection during neutropenic period  Description: INTERVENTIONS:  - Monitor WBC    Outcome: Progressing     Problem: SAFETY ADULT  Goal: Patient will remain free of falls  Description: INTERVENTIONS:  - Keep Call bell within reach  - Keep bed low and locked with side rails adjusted as appropriate  - Keep care items and personal belongings within reach  - Initiate and maintain comfort rounds    Outcome: Progressing  Goal: Maintain or return to baseline ADL function  Description: INTERVENTIONS:  - Provide patient education as appropriate  Outcome: Progressing  Goal: Maintains/Returns to pre admission functional level  Description: INTERVENTIONS:    - Record patient progress and toleration of activity level   Outcome: Progressing     Problem: Knowledge Deficit  Goal: Patient/family/caregiver demonstrates understanding of disease process, treatment plan, medications, and discharge instructions  Description: Complete learning assessment and assess knowledge base    Interventions:  - Provide teaching at level of understanding  - Provide teaching via preferred learning methods  Outcome: Progressing     Problem: DISCHARGE PLANNING  Goal: Discharge to home or other facility with appropriate resources  Description: INTERVENTIONS:  - Identify barriers to discharge w/patient and caregiver  - Arrange for needed discharge resources and transportation as appropriate  - Identify discharge learning needs (meds, wound care, etc )  - Arrange for interpretive services to assist at discharge as needed  - Refer to Case Management Department for coordinating discharge planning if the patient needs post-hospital services based on physician/advanced practitioner order or complex needs related to functional status, cognitive ability, or social support system  Outcome: Progressing

## 2023-05-22 ENCOUNTER — OFFICE VISIT (OUTPATIENT)
Dept: OBGYN CLINIC | Facility: MEDICAL CENTER | Age: 33
End: 2023-05-22

## 2023-05-22 VITALS
BODY MASS INDEX: 27.88 KG/M2 | SYSTOLIC BLOOD PRESSURE: 122 MMHG | WEIGHT: 167.3 LBS | HEIGHT: 65 IN | DIASTOLIC BLOOD PRESSURE: 90 MMHG

## 2023-05-22 DIAGNOSIS — Z98.891 STATUS POST PRIMARY LOW TRANSVERSE CESAREAN SECTION: Primary | ICD-10-CM

## 2023-05-22 DIAGNOSIS — O13.1 GESTATIONAL HYPERTENSION, FIRST TRIMESTER: ICD-10-CM

## 2023-05-22 LAB — PLACENTA IN STORAGE: NORMAL

## 2023-05-22 NOTE — PROGRESS NOTES
"Subjective     Delaymerissa Abraham is a 33 y o   female who presents to the office 1 weeks status post primary   for elective, GHTN  Eating a regular diet with difficulty  Bowel movements are normal  Pain is controlled without any medications  Denies headache , visual changes or epigastric pain   Does get weepy when nightfall sets/ otherwise emotionally doing well   The following portions of the patient's history were reviewed and updated as appropriate: allergies, current medications, past family history, past medical history, past social history, past surgical history and problem list     Review of Systems  Pertinent items are noted in HPI  Objective  /90   Ht 5' 5\" (1 651 m)   Wt 75 9 kg (167 lb 4 8 oz)   LMP 2022 (Exact Date)   Breastfeeding Yes   BMI 27 84 kg/m²     General:  alert and oriented, in no acute distress   Abdomen: soft, bowel sounds active, non-tender   Incision:   healing well, no drainage, no erythema, no hernia, no seroma, no swelling, no dehiscence, incision well approximated         Assessment      Doing well postoperatively  Plan     1  Continue any current medications  2  Wound care discussed  3  Activity restrictions: no gym class and no sports  4   Follow up: 1 weeks for BP check and postpartum depression sign and symptoms    "

## 2023-05-31 ENCOUNTER — SOCIAL WORK (OUTPATIENT)
Dept: BEHAVIORAL/MENTAL HEALTH CLINIC | Facility: CLINIC | Age: 33
End: 2023-05-31

## 2023-05-31 DIAGNOSIS — F41.9 ANXIETY: Primary | ICD-10-CM

## 2023-05-31 NOTE — PSYCH
Assessment/Plan:      Diagnoses and all orders for this visit:    Anxiety          Subjective: Pt presented for initial visit  She grew up in [place] with both parents and a younger sister  She is very close with her family  Pt is a  at [employer]  She enjoys going for walks, exercise and reading  She is  to her , Gómez Weems (together 12 years and  2 5 years)  Gómez Weems is a  with a flexible schedule  Pt gave birth to Lauren MEIER on 5/14/23  This was a planned pregnancy  Both pt's pregnancy and birthing experience were good  Pt is breast feeding and this is going well  Pt initially felt baby blues and scheduled an appointment for therapy  Since then however, she has been feeling better  She does find herself crying easily at times and occasionally can feel overwhelmed  Pt has a great deal of support from family, in-laws, friends and her   She has been maintaining positive self care and is going for walks daily  Her appetite and fluid intake are good  Processed pt's feelings and provided post-partum education  Pt does not feel a need to return for therapy at this time  Should her symptoms worsen, she will call to schedule a follow up  Patient ID: Zaira Vaughn is a 33 y o  female  HPI    Review of Systems      Objective:     Physical Exam      Behavioral Health Psychotherapy Progress Note    Psychotherapy Provided: Individual Psychotherapy     1  Anxiety            Goals addressed in session: Goal 1     DATA:   During this session, this clinician used the following therapeutic modalities: Engagement Strategies, Mindfulness-based Strategies and Supportive Psychotherapy    Substance Abuse was not addressed during this session   If the client is diagnosed with a co-occurring substance use disorder, please indicate any changes in the frequency or amount of use: na  Stage of change for addressing substance use diagnoses: No substance "use/Not applicable    ASSESSMENT:  Polo Stephen presents with a Euthymic/ normal mood  her affect is Tearful, which is congruent, with her mood and the content of the session  Polo Stephen presents with a none risk of suicide, none risk of self-harm, and none risk of harm to others  For any risk assessment that surpasses a \"low\" rating, a safety plan must be developed  A safety plan was indicated: no  If yes, describe in detail na    PLAN: Pt will call for services as needed  Behavioral Health Treatment Plan and Discharge Plan:  Pt will call as needed       Visit start and stop times:    05/31/23  Start Time: 0900  Stop Time: 0930  Total Visit Time: 30 minutes   "

## 2023-06-01 ENCOUNTER — OFFICE VISIT (OUTPATIENT)
Dept: OBGYN CLINIC | Facility: CLINIC | Age: 33
End: 2023-06-01

## 2023-06-01 VITALS
HEIGHT: 65 IN | SYSTOLIC BLOOD PRESSURE: 115 MMHG | DIASTOLIC BLOOD PRESSURE: 70 MMHG | WEIGHT: 165.4 LBS | BODY MASS INDEX: 27.56 KG/M2

## 2023-06-01 DIAGNOSIS — Z98.891 STATUS POST PRIMARY LOW TRANSVERSE CESAREAN SECTION: Primary | ICD-10-CM

## 2023-06-01 NOTE — PROGRESS NOTES
"Subjective     Zaira Johana is a 33 y o   female who presents to the office 2 weeks status post primary   for [facility]  Eating a regular diet without difficulty  Bowel movements are normal  The patient is not having any pain  The following portions of the patient's history were reviewed and updated as appropriate: allergies, current medications, past family history, past medical history, past social history, past surgical history and problem list     Review of Systems  Pertinent items are noted in HPI  Objective  /70   Ht 5' 5\" (1 651 m)   Wt 75 kg (165 lb 6 4 oz)   LMP 2022 (Exact Date)   BMI 27 52 kg/m²     General:  alert and oriented, in no acute distress   Abdomen: soft, bowel sounds active, non-tender   Incision:   healing well, no drainage, no erythema, no hernia, no seroma, no swelling, no dehiscence, incision well approximated         Assessment      Doing well postoperatively  Plan     1  Continue any current medications  2  Wound care discussed  3  Activity restrictions: no gym class and no sports  4   Follow up: 4 weeks for postpartum  "

## 2023-06-26 ENCOUNTER — POSTPARTUM VISIT (OUTPATIENT)
Dept: OBGYN CLINIC | Facility: MEDICAL CENTER | Age: 33
End: 2023-06-26

## 2023-06-26 VITALS
DIASTOLIC BLOOD PRESSURE: 72 MMHG | BODY MASS INDEX: 27.52 KG/M2 | WEIGHT: 165.2 LBS | HEIGHT: 65 IN | SYSTOLIC BLOOD PRESSURE: 118 MMHG

## 2023-06-26 DIAGNOSIS — Z78.9 USES BIRTH CONTROL: Primary | ICD-10-CM

## 2023-06-26 PROCEDURE — 99024 POSTOP FOLLOW-UP VISIT: CPT | Performed by: OBSTETRICS & GYNECOLOGY

## 2023-06-26 RX ORDER — ACETAMINOPHEN AND CODEINE PHOSPHATE 120; 12 MG/5ML; MG/5ML
1 SOLUTION ORAL DAILY
Qty: 84 TABLET | Refills: 4 | Status: SHIPPED | OUTPATIENT
Start: 2023-06-26

## 2023-06-26 RX ORDER — SODIUM FLUORIDE 6 MG/ML
PASTE, DENTIFRICE DENTAL
COMMUNITY
Start: 2023-06-17

## 2023-06-26 NOTE — PROGRESS NOTES
"Subjective     Elodia Hickman is a 33 y o  female who presents for a postpartum visit  She is 6 weeks postpartum following a primary C/S   I have fully reviewed the prenatal and intrapartum course  The delivery was at term gestational weeks  Outcome: primary  section, low transverse incision  Anesthesia: spinal  Postpartum course has been uncomplicated    Baby's course has been uncomplicated   Baby is feeding by breast  Bleeding no bleeding  Bowel function is normal  Bladder function is normal  Patient is not sexually active  Contraception method is oral progesterone-only contraceptive  Postpartum depression screening: negative  The following portions of the patient's history were reviewed and updated as appropriate: allergies, current medications, past family history, past medical history, past social history, past surgical history and problem list     Review of Systems  Pertinent items are noted in HPI       Objective     /72   Ht 5' 5\" (1 651 m)   Wt 74 9 kg (165 lb 3 2 oz)   LMP 2022 (Exact Date)   Breastfeeding Yes   BMI 27 49 kg/m²    General:  alert and oriented, in no acute distress    Breasts:  inspection negative, no nipple discharge or bleeding, no masses or nodularity palpable   Lungs: unlabored breathing    Abdomen: incison clean close and intact       Assessment/Plan     Normal  postpartum exam  Pap smear not done at today's visit  1  Contraception: oral progesterone-only contraceptive- sent to pharmacy   2  Good friend passed away at age 29 from breast CA she found while breastfeeding  - on exam no palpable masses or skin changes / no family hx   3  Follow up in: 4 months  For yearly or as needed    "

## 2023-07-13 DIAGNOSIS — B00.9 HSV INFECTION: Primary | ICD-10-CM

## 2023-07-13 RX ORDER — VALACYCLOVIR HYDROCHLORIDE 500 MG/1
500 TABLET, FILM COATED ORAL DAILY
Qty: 90 TABLET | Refills: 3 | Status: SHIPPED | OUTPATIENT
Start: 2023-07-13 | End: 2024-07-07

## 2023-07-13 NOTE — TELEPHONE ENCOUNTER
Patient called. Wanted to receive a refill for valACYclovir (VALTREX). Right now receiving a dose of 1000 mg, want to reduce that for 500 mg. They give her that dose because she was pregnant. Also wanted to see if she can get this before this Saturday. Please review when you have a chance.  Ty!

## 2023-08-24 ENCOUNTER — OFFICE VISIT (OUTPATIENT)
Dept: OBGYN CLINIC | Facility: MEDICAL CENTER | Age: 33
End: 2023-08-24
Payer: COMMERCIAL

## 2023-08-24 VITALS
DIASTOLIC BLOOD PRESSURE: 62 MMHG | BODY MASS INDEX: 28.16 KG/M2 | WEIGHT: 169 LBS | HEIGHT: 65 IN | SYSTOLIC BLOOD PRESSURE: 110 MMHG

## 2023-08-24 DIAGNOSIS — N61.0 MASTITIS: Primary | ICD-10-CM

## 2023-08-24 PROBLEM — O26.891 PREGNANCY HEADACHE IN FIRST TRIMESTER: Status: RESOLVED | Noted: 2022-11-15 | Resolved: 2023-08-24

## 2023-08-24 PROBLEM — O99.019 IRON DEFICIENCY ANEMIA DURING PREGNANCY: Status: RESOLVED | Noted: 2023-01-12 | Resolved: 2023-08-24

## 2023-08-24 PROBLEM — O21.9 NAUSEA AND VOMITING IN PREGNANCY: Status: RESOLVED | Noted: 2022-11-15 | Resolved: 2023-08-24

## 2023-08-24 PROBLEM — O09.899 RUBELLA NON-IMMUNE STATUS, ANTEPARTUM: Status: RESOLVED | Noted: 2023-05-14 | Resolved: 2023-08-24

## 2023-08-24 PROBLEM — D50.9 IRON DEFICIENCY ANEMIA DURING PREGNANCY: Status: RESOLVED | Noted: 2023-01-12 | Resolved: 2023-08-24

## 2023-08-24 PROBLEM — O13.9 GESTATIONAL HTN: Status: RESOLVED | Noted: 2023-05-13 | Resolved: 2023-08-24

## 2023-08-24 PROBLEM — R51.9 PREGNANCY HEADACHE IN FIRST TRIMESTER: Status: RESOLVED | Noted: 2022-11-15 | Resolved: 2023-08-24

## 2023-08-24 PROBLEM — Z28.39 RUBELLA NON-IMMUNE STATUS, ANTEPARTUM: Status: RESOLVED | Noted: 2023-05-14 | Resolved: 2023-08-24

## 2023-08-24 PROBLEM — O36.63X0 EXCESSIVE FETAL GROWTH AFFECTING MANAGEMENT OF PREGNANCY IN THIRD TRIMESTER: Status: RESOLVED | Noted: 2023-02-13 | Resolved: 2023-08-24

## 2023-08-24 PROCEDURE — 99213 OFFICE O/P EST LOW 20 MIN: CPT | Performed by: NURSE PRACTITIONER

## 2023-08-24 RX ORDER — ERGOCALCIFEROL 1.25 MG/1
50000 CAPSULE ORAL
COMMUNITY

## 2023-08-24 NOTE — PROGRESS NOTES
Assessment/Plan:      Diagnoses and all orders for this visit:    Mastitis    Other orders  -     ergocalciferol (VITAMIN D2) 50,000 units; Take 50,000 Units by mouth      -Reviewed cool compresses and ibuprofen for the next 24 hours. If no improvement will begin dicloxacillin. Dicloxacillin 1 tablet 4 times daily for 10 days sent to pharmacy on file. Written information provided  -Written information provided about lactation services available at [facility] along with milk duct breast PT.  -Signs and symptoms to report reviewed. Return to office annual exam or sooner as needed    Subjective:     Services ID: Malathi Mejia is a 33 y.o. female. HPI P1 here with concerns for left breast redness, swelling and pain over the past week. Is currently breast-feeding. Breast-feeding well. Denies significant amount of fever or chills. Has a red spot along with pain and swelling of right breast for the past week. Denies alleviating factors. Aggravating factors are breast-feeding, anything touching area. Has not tried any OTC remedies. Last Pap smear 4/30/21 NILM    Review of Systems   Constitutional: Positive for chills. Negative for fever. Respiratory: Negative. Cardiovascular: Negative. Genitourinary: Negative. Objective:   /62 (BP Location: Left arm)   Ht 5' 5" (1.651 m)   Wt 76.7 kg (169 lb)   LMP 08/15/2022 Comment: breast feeding  Breastfeeding Yes   BMI 28.12 kg/m²      Physical Exam  Vitals reviewed. Exam conducted with a chaperone present. Constitutional:       Appearance: Normal appearance. Chest:          Comments: Left breast full and firm  Right breast full and firm 2 x 2 inch area of redness, tender to touch  Neurological:      Mental Status: She is alert and oriented to person, place, and time.    Psychiatric:         Mood and Affect: Mood normal.         Behavior: Behavior normal.

## 2023-11-21 ENCOUNTER — TELEMEDICINE (OUTPATIENT)
Dept: BEHAVIORAL/MENTAL HEALTH CLINIC | Facility: CLINIC | Age: 33
End: 2023-11-21
Payer: COMMERCIAL

## 2023-11-21 DIAGNOSIS — F43.22 ADJUSTMENT DISORDER WITH ANXIETY: Primary | ICD-10-CM

## 2023-11-21 PROCEDURE — 90791 PSYCH DIAGNOSTIC EVALUATION: CPT | Performed by: COUNSELOR

## 2023-11-28 ENCOUNTER — TELEMEDICINE (OUTPATIENT)
Dept: BEHAVIORAL/MENTAL HEALTH CLINIC | Facility: CLINIC | Age: 33
End: 2023-11-28
Payer: COMMERCIAL

## 2023-11-28 DIAGNOSIS — F43.22 ADJUSTMENT DISORDER WITH ANXIETY: Primary | ICD-10-CM

## 2023-11-28 PROCEDURE — 90837 PSYTX W PT 60 MINUTES: CPT | Performed by: COUNSELOR

## 2023-11-30 PROBLEM — F43.22 ADJUSTMENT DISORDER WITH ANXIETY: Status: ACTIVE | Noted: 2023-11-30

## 2023-11-30 NOTE — PSYCH
Virtual AWV Consent    Verification of patient location: confirmed at home     Patient is located at Home in the following state in which I hold an active license [place]    The patient was identified by name and date of birth. Rell Duke was informed that this is a telemedicine visit and that the visit is being conducted through the Vendscreen. She agrees to proceed. .  My office door was closed. No one else was in the room. She acknowledged consent and understanding of privacy and security of the video platform. The patient has agreed to participate and understands they can discontinue the visit at any time. Patient is aware this is a billable service. Reason for visit is the[place] follow up     Encounter provider Elsi Parker [facility]    Provider located at [address] [place] [address] Road [id number]      Recent Visits  Date Type Provider Dept   11/28/23 [id number] [facility] past 7 days and meeting all other requirements  Future Appointments  No visits were found meeting these conditions. Showing future appointments within next 150 days and meeting all other requirements           Visit Time  Total Visit Duration: 54 minutes    Behavioral Health Psychotherapy Progress Note    Psychotherapy Provided: Individual Psychotherapy     1. Adjustment disorder with anxiety            Goals addressed in session: Goal 1 ; created treatment plan     DATA: Regulo Avila presented virtually in session and confirmed that she is at home. The session was focused on review of her mood and GRAPES. Overall, she is doing well and is recognizing with self-care, her mood is better and she is able to manage her feelings of being overwhelmed. The treatment plan was updated to reflect this goal. Will touch base in 6 weeks for a check in. Regulo Avila was encouraged to call if she  is needed sooner.    During this session, this clinician used the following therapeutic modalities: Client-centered Therapy, Cognitive Behavioral Therapy, and Supportive Psychotherapy    Substance Abuse was addressed during this session. If the client is diagnosed with a co-occurring substance use disorder, please indicate any changes in the frequency or amount of use: Jana Cevallos does not use substances. Stage of change for addressing substance use diagnoses: No substance use/Not applicable    ASSESSMENT:  Fauzia Soares presents with a Euthymic/ normal mood. her affect is Normal range and intensity, which is congruent, with her mood and the content of the session. The client has made progress on their goals. Jana Cevallos was engaged in session. Fauzia Soares presents with a none risk of suicide, none risk of self-harm, and none risk of harm to others. For any risk assessment that surpasses a "low" rating, a safety plan must be developed. A safety plan was indicated: no  If yes, describe in detail NA    PLAN: Between sessions, Fauzia Soares will continue to work on managing her mood and practicing [address] [place] [address], [facility] [address]. At the next session, the therapist will use Client-centered Therapy, Cognitive Behavioral Therapy, and Supportive Psychotherapy to address mood and anxiety. Behavioral Health Treatment Plan and Discharge Planning: Fauzia Soares is aware of and agrees to continue to work on their treatment plan. They have identified and are working toward their discharge goals.  yes    Visit start and stop times:    11/28/2023  Start Time: 1706  Stop Time: 1800  Total Visit Time: 54 minutes

## 2023-11-30 NOTE — PSYCH
Virtual AWV Consent    Verification of patient location: confirmed at home     Patient is located at Home in the following state in which I hold an active license [place]    The patient was identified by name and date of birth. Sheryl Talamantes was informed that this is a telemedicine visit and that the visit is being conducted through the [employer]. She agrees to proceed. .  My office door was closed. No one else was in the room. She acknowledged consent and understanding of privacy and security of the video platform. The patient has agreed to participate and understands they can discontinue the visit at any time. Patient is aware this is a billable service. Reason for visit is initial intake assessment    Encounter provider Jessica Garcia [facility]    Provider located at [address] [place] [phone]      Recent Visits  Date Type Provider Dept   11/28/23 [address] [place] [address] [facility] recent visits within past 7 days and meeting all other requirements  Future Appointments  No visits were found meeting these conditions. Showing future appointments within next 150 days and meeting all other requirements           Visit Time  Total Visit Duration: 55 minutes     Behavioral Health Psychotherapy Assessment    Date of Initial Psychotherapy Assessment: 11/30/23  Referral Source: Self  Has a release of information been signed for the referral source? NA    Preferred Name: Sheryl Talamantes  Preferred Pronouns: She/her  YOB: 1990 Age: 29 y.o. Sex assigned at birth: female   Gender Identity: Female  Race:   Preferred Language: English    Emergency Contact:  Full Name: Mahesh Escobar   Relationship to Client:   Contact information:  In cell phone    Primary Care Physician:  aVngie Mirza, [address] [place], [place] [address]gonsalo Weber  [phone]  Has a release of information been signed? No    Physical Health History:  Past surgical procedures:   Do you have a history of any of the following: other NA  Do you have any mobility issues? No    Relevant Family History:  None noted; some mild anxiety    Presenting Problem (What brings you in?)  Prentis Seip reports some increased anxiety and feelings of being overwhelmed with her job, being a mom, and adjustment to everything. Mental Health Advance Directive:  Do you currently have a Mental Health Advance Directive? no    Diagnosis:   Diagnosis ICD-10-CM Associated Orders   1.  Adjustment disorder with anxiety  F43.22           Initial Assessment:     Current Mental Status:    Appearance: appropriate      Behavior/Manner: cooperative      Affect/Mood:  Euthymic    Speech:  Normal    Sleep:  Normal    Oriented to: oriented to self, oriented to place and oriented to time       Clinical Symptoms    Anxiety: yes      Anxiety Symptoms: nervous/anxious      Have you ever been assaultive to others or the environment: No      Have you ever been self-injurious: No      Counseling History:  Previous Counseling or Treatment  (Mental Health or Drug & Alcohol): No    Have you previously taken psychiatric medications: No      Suicide Risk Assessment  Have you ever had a suicide attempt: No    Have you had incidents of suicidal ideation: No    Are you currently experiencing suicidal thoughts: No      Substance Abuse/Addiction Assessment:  Alcohol: No    Heroin: No    Fentanyl: No    Opiates: No    Cocaine: No    Amphetamines: No    Hallucinogens: No    Club Drugs: No    Benzodiazepines: No    Other Rx Meds: No    Marijuana: No    Have you experienced blackouts as a result of substance use: No    Have you had any periods of abstinence: No    Have you experienced symptoms of withdrawal: No    Have you ever overdosed on any substances?: No    Are you currently using any Medication Assisted Treatment for Substance Use: No      Compulsive Behaviors:  Compulsive Behavior Information:  NA    Disordered Eating History:  Do you have a history of disordered eating: No      Social Determinants of Health:    SDOH:  Stress and other    Other SDOH:  Recent birth of daugther 6 months ago    Trauma and Abuse History:    Have you ever been abused: No      Legal History:    Have you ever been arrested  or had a DUI: No      Have you been incarcerated: No      Are you currently on parole/probation: No      Any pending legal charges: No      Relationship History:    Current marital status:       Relationship History:  -Both families are local  -Multiple friends    Employment History    Are you currently employed: Yes      Longest period of employment:  School district    Employer/ Job title:   [email or web address]    Sources of income/financial support:  Work     History:      Status: no history of [address] duty  Educational History:     Have you ever been diagnosed with a learning disability: No      Highest level of education:  Bachelor's Degree    Have you ever had an IEP or 504-plan: No      Do you need assistance with reading or writing: No      Recommended Treatment:     Psychotherapy:  Individual sessions    Frequency:  1 time    Session frequency:  Monthly    Visit start and stop times:    11/21/2023  Start Time: 1705  Stop Time: 1800  Total Visit Time: 55 minutes

## 2023-11-30 NOTE — BH TREATMENT PLAN
Outpatient [address]  1990     Date of Initial Psychotherapy Assessment: November 21, 2023   Date of Current Treatment Plan: November 28, 2023  Treatment Plan Target Date: May 21, 2024  Treatment Plan Expiration Date: May 21, 2024    Diagnosis:   1. Adjustment disorder with anxiety            Area(s) of Need: Managing overstimulation at times with work, being a new mom. Long Term Goal 1 (in the client's own words): Prentis Seip wants to work on managing her mood and anxiety. Stage of Change: Action    Target Date for completion: May 21, 2024     Anticipated therapeutic modalities: Cognitive behavioral therapy, supportive psychotherapy      People identified to complete this goal: Prentis Seip, this therapist       Objective 1: (identify the means of measuring success in meeting the objective): Prentis Seip will work on practicing strategies to help her with self-regulating and mood management. She will work on implementing GRAPES on a routine basis to help her with finding releases to help her balance feelings of being overwhelmed. Objective 2: (identify the means of measuring success in meeting the objective): Prentis Seip will continue to use her support network, communicate her needs and take the time that she needs for herself. Long Term Goal 2 (in the client's own words): Prentis Seip would like to have occasional check-ins to balance her feelings. Stage of Change: Action    Target Date for completion: May 2024     Anticipated therapeutic modalities: Cognitive behavioral therapy, supportive psychotherapy      People identified to complete this goal: Prentis Seip, this therapist       Objective 1: (identify the means of measuring success in meeting the objective): Prentis Seip will continue to check in periodically in order to ensure that she is holding herself accountable.        Objective 2: (identify the means of measuring success in meeting the objective): NA     Long Term Goal 3 (in the client's own words): NA    Stage of Change: NA    Target Date for completion: NA     Anticipated therapeutic modalities: NA     People identified to complete this goal: NA      Objective 1: (identify the means of measuring success in meeting the objective): NA      Objective 2: (identify the means of measuring success in meeting the objective): NA     I am currently under the care of a [facility] psychiatric provider: no    My [facility] psychiatric provider is: NA    I am currently taking psychiatric medications: No    I feel that I will be ready for discharge from mental health care when I reach the following (measurable goal/objective): Alexa Reyes will be discharged when she feels that she is at her baseline. For children and adults who have a legal guardian:   Has there been any change to custody orders and/or guardianship status? NA. If yes, attach updated documentation. I have created my Crisis Plan and have been offered a copy of this plan    [address]: Diagnosis and Treatment Plan explained to [employer] acknowledges an understanding of their diagnosis. Cheyanne Barker agrees to this treatment plan. I have been offered a copy of this Treatment Plan. Yes; a copy will be sent to her chart for review. Alexa Reyes attends her appointments virtually.

## 2024-01-02 ENCOUNTER — ANNUAL EXAM (OUTPATIENT)
Dept: OBGYN CLINIC | Facility: MEDICAL CENTER | Age: 34
End: 2024-01-02
Payer: COMMERCIAL

## 2024-01-02 VITALS
HEIGHT: 65 IN | WEIGHT: 171.8 LBS | DIASTOLIC BLOOD PRESSURE: 70 MMHG | SYSTOLIC BLOOD PRESSURE: 100 MMHG | BODY MASS INDEX: 28.62 KG/M2

## 2024-01-02 DIAGNOSIS — Z01.419 ENCOUNTER FOR WELL WOMAN EXAM WITH ROUTINE GYNECOLOGICAL EXAM: Primary | ICD-10-CM

## 2024-01-02 DIAGNOSIS — Z12.4 PAP SMEAR FOR CERVICAL CANCER SCREENING: ICD-10-CM

## 2024-01-02 PROCEDURE — G0145 SCR C/V CYTO,THINLAYER,RESCR: HCPCS | Performed by: OBSTETRICS & GYNECOLOGY

## 2024-01-02 PROCEDURE — G0476 HPV COMBO ASSAY CA SCREEN: HCPCS | Performed by: OBSTETRICS & GYNECOLOGY

## 2024-01-02 PROCEDURE — S0612 ANNUAL GYNECOLOGICAL EXAMINA: HCPCS | Performed by: OBSTETRICS & GYNECOLOGY

## 2024-01-02 RX ORDER — DIPHENHYDRAMINE HYDROCHLORIDE 25 MG/1
1 TABLET ORAL DAILY
COMMUNITY
Start: 2023-11-09

## 2024-01-02 NOTE — PROGRESS NOTES
"[facility] [address], [place], [place]    ASSESSMENT/PLAN: Wander Barker is a 33 y.o.  who presents for annual gynecologic exam.    Encounter for routine gynecologic examination  - Routine well woman exam completed today.  - Cervical Cancer Screening: Current ASCCP Guidelines reviewed. Last Pap:  Next Pap Due:   - HPV Vaccination status: Immunization series complete  - Contraceptive counseling discussed.  Current contraception: None     Additional problems addressed during this visit:  1. Encounter for well woman exam with routine gynecological exam        CC:  Annual Gynecologic Examination    HPI: Wander Barker is a 33 y.o.  who presents for annual gynecologic examination.  HPI  For routine annual exam, still pumping  States cycles are regular    The following portions of the patient's history were reviewed and updated as appropriate: allergies, current medications, past family history, past medical history, obstetric history, gynecologic history, past social history, past surgical history and problem list.    Review of Systems   Constitutional: Negative.    HENT: Negative.     Eyes: Negative.    Respiratory: Negative.     Cardiovascular: Negative.    Gastrointestinal: Negative.    Genitourinary: Negative.    Musculoskeletal: Negative.    All other systems reviewed and are negative.        Objective:  /70   Ht 5' 5\" (1.651 m)   Wt 77.9 kg (171 lb 12.8 oz)   LMP 12/10/2023 (Exact Date)   Breastfeeding Yes   BMI 28.59 kg/m²    Physical Exam  Vitals reviewed.   Constitutional:       General: She is not in acute distress.     Appearance: She is well-developed.   HENT:      Head: Normocephalic and atraumatic.      Nose: Nose normal.   Cardiovascular:      Rate and Rhythm: Normal rate.   Pulmonary:      Effort: Pulmonary effort is normal. No respiratory distress.   Chest:   Breasts:     Breasts are symmetrical.      Right: Normal. No mass, nipple " discharge, skin change or tenderness.      Left: Normal. No mass, nipple discharge, skin change or tenderness.   Abdominal:      General: There is no distension.      Palpations: Abdomen is soft. There is no mass.      Tenderness: There is no abdominal tenderness. There is no guarding or rebound.   Genitourinary:     General: Normal vulva.      Exam position: Lithotomy position.      Labia:         Right: No lesion.         Left: No lesion.       Urethra: No prolapse (urethral meatus normal).      Vagina: Normal. No vaginal discharge, erythema or bleeding.      Cervix: Normal.      Uterus: Normal.       Adnexa: Right adnexa normal and left adnexa normal.   Musculoskeletal:         General: Normal range of motion.      Cervical back: Normal range of motion.   Lymphadenopathy:      Upper Body:      Right upper body: No supraclavicular, axillary or pectoral adenopathy.      Left upper body: No supraclavicular, axillary or pectoral adenopathy.      Lower Body: No right inguinal adenopathy. No left inguinal adenopathy.   Skin:     General: Skin is warm and dry.   Neurological:      Mental Status: She is alert and oriented to person, place, and time.   Psychiatric:         Behavior: Behavior normal.         Thought Content: Thought content normal.         Judgment: Judgment normal.

## 2024-01-03 LAB
HPV HR 12 DNA CVX QL NAA+PROBE: NEGATIVE
HPV16 DNA CVX QL NAA+PROBE: NEGATIVE
HPV18 DNA CVX QL NAA+PROBE: NEGATIVE

## 2024-01-06 LAB
LAB AP GYN PRIMARY INTERPRETATION: NORMAL
Lab: NORMAL

## 2024-01-10 DIAGNOSIS — N83.209 CYST OF OVARY, UNSPECIFIED LATERALITY: Primary | ICD-10-CM

## 2024-01-10 DIAGNOSIS — R10.2 PELVIC PAIN: ICD-10-CM

## 2024-02-06 ENCOUNTER — TELEMEDICINE (OUTPATIENT)
Dept: BEHAVIORAL/MENTAL HEALTH CLINIC | Facility: CLINIC | Age: 34
End: 2024-02-06

## 2024-02-06 DIAGNOSIS — F43.22 ADJUSTMENT DISORDER WITH ANXIETY: Primary | ICD-10-CM

## 2024-02-07 ENCOUNTER — HOSPITAL ENCOUNTER (OUTPATIENT)
Dept: RADIOLOGY | Facility: IMAGING CENTER | Age: 34
Discharge: HOME | End: 2024-02-07
Payer: COMMERCIAL

## 2024-02-07 DIAGNOSIS — R10.2 PELVIC PAIN: ICD-10-CM

## 2024-02-07 DIAGNOSIS — N83.209 CYST OF OVARY, UNSPECIFIED LATERALITY: ICD-10-CM

## 2024-02-07 PROCEDURE — 76856 US EXAM PELVIC COMPLETE: CPT

## 2024-02-07 PROCEDURE — 76830 TRANSVAGINAL US NON-OB: CPT

## 2024-02-07 NOTE — PSYCH
Virtual AWV Consent    Verification of patient location: confirmed at home     Patient is located at Home in the following state in which I hold an active license [place]    The patient was identified by name and date of birth. Wander Barker was informed that this is a telemedicine visit and that the visit is being conducted through the [employer] platform. She agrees to proceed..  My office door was closed. No one else was in the room.  She acknowledged consent and understanding of privacy and security of the video platform. The patient has agreed to participate and understands they can discontinue the visit at any time.    Patient is aware this is a billable service.     Reason for visit is therapy follow up     Encounter provider Clemente De Los Santos LPC    Provider located at DIALYSIS CENTER  [facility]  [address] [facility]  [place] [id number]      Recent Visits  Date Type Provider Dept   02/06/24 Telemedicine Clemente Manley LPC [facility]   Showing recent visits within past 7 days and meeting all other requirements  Future Appointments  No visits were found meeting these conditions.  Showing future appointments within next 150 days and meeting all other requirements           Visit Time  Total Visit Duration: 30 minutes    Behavioral Health Psychotherapy Progress Note    Psychotherapy Provided: Individual Psychotherapy     1. Adjustment disorder with anxiety            Goals addressed in session: Goal 1     DATA: Wander presented virtually in session and confirmed that she is at home. The session was focused on how she is doing and discussing discharge. Wander has been doing well with managing her mood and anxiety. She feels that she is thriving as a parent and is finding her flow. Wander is in agreement with the plan for discharge. She will be discharged if she does not reach out in three months.    During this session, this clinician used the following  "therapeutic modalities: Client-centered Therapy, Cognitive Behavioral Therapy, and Supportive Psychotherapy    Substance Abuse was addressed during this session. If the client is diagnosed with a co-occurring substance use disorder, please indicate any changes in the frequency or amount of use: Wander does not use substances. Stage of change for addressing substance use diagnoses: No substance use/Not applicable    ASSESSMENT:  Wander Barker presents with a Euthymic/ normal mood.     her affect is Normal range and intensity, which is congruent, with her mood and the content of the session. The client has made progress on their goals.    Wander was engaged in session.  Wander Barker presents with a none risk of suicide, none risk of self-harm, and none risk of harm to others.    For any risk assessment that surpasses a \"low\" rating, a safety plan must be developed.    A safety plan was indicated: no  If yes, describe in detail na    PLAN: Between sessions, Wander Barker will be discharged from services. At the next session, the therapist will use Client-centered Therapy, Cognitive Behavioral Therapy, and Supportive Psychotherapy to address mood and anxiety.    Behavioral Health Treatment Plan and Discharge Planning: Wnader Barker is aware of and agrees to continue to work on their treatment plan. They have identified and are working toward their discharge goals. yes    Visit start and stop times:    02/06/2024  Start Time: 1706  Stop Time: 1736  Total Visit Time: 30 minutes  "

## 2024-06-07 ENCOUNTER — DOCUMENTATION (OUTPATIENT)
Dept: BEHAVIORAL/MENTAL HEALTH CLINIC | Facility: CLINIC | Age: 34
End: 2024-06-07

## 2024-06-07 NOTE — PROGRESS NOTES
Psychotherapy Discharge Summary    Preferred Name: Wander Barker  YOB: 1990    Admission date to psychotherapy: November 2023    Referred by: Self    Presenting Problem: Anxiety    Course of treatment included : individual therapy     Progress/Outcome of Treatment Goals (brief summary of course of treatment) Wander was doing well at the start of therapy, she wanted a few sessions aimed at processing some anxiety and self-care strategies.     Treatment Complications (if any): None    Treatment Progress: excellent    Current [facility] Psychiatric Provider: None noted.     Discharge Medications include: None noted.     Discharge Date: June 7, 2024    Discharge Diagnosis: No diagnosis found.    Criteria for Discharge: completed treatment goals and objectives and is no longer in need of services    Aftercare recommendations include (include specific referral names and phone numbers, if appropriate): Wander is encouraged to contact her physician in the future if she feels that she needs a new referral for services. If she is in crisis, Wander is encouraged to go to her local ED or call 911.     Prognosis: excellent

## 2024-08-19 ENCOUNTER — NURSE TRIAGE (OUTPATIENT)
Dept: OTHER | Facility: OTHER | Age: 34
End: 2024-08-19

## 2024-08-20 NOTE — TELEPHONE ENCOUNTER
"Regarding: sharp abdominal pain  ----- Message from Amy THOMPSON sent at 8/19/2024  9:27 PM EDT -----  \"I am having really sharp pain in the right hand side of my lower abdomen, I had 2 really bad episodes. I don't know if it's a cyst rupturing\"    "

## 2024-08-20 NOTE — TELEPHONE ENCOUNTER
Contacted on-call provider who advised that if pt hasn't taken anything yet, Tylenol and ibuprofen would be a good choice.  Continue heat as well and give it an hour or so, but if not improving would recommend eval in ER.  May not fully resolve tonight, especially if it is a cyst rupture (timing lines up with expected ovulation timing). But if not able to be controlled, needs imaging.    Contacted patient to make her aware of on-call provider's recommendations.  She verbalized understanding and was appreciative.

## 2024-08-20 NOTE — TELEPHONE ENCOUNTER
"Reason for Disposition  • [1] SEVERE pain AND [2] present < 1 hour    Answer Assessment - Initial Assessment Questions  1. LOCATION: \"Where does it hurt?\"       Right groin    2. RADIATION: \"Does the pain shoot anywhere else?\" (e.g., chest, back)      Shooting towards buttocks    3. ONSET: \"When did the pain begin?\" (e.g., minutes, hours or days ago)       Had episode earlier today that last 30 minutes, but another episode started about 15-20 minutes ago    4. SUDDEN: \"Gradual or sudden onset?\"      Sudden     5. PATTERN \"Does the pain come and go, or is it constant?\"     - If constant: \"Is it getting better, staying the same, or worsening?\"       (Note: Constant means the pain never goes away completely; most serious pain is constant and it progresses)      - If intermittent: \"How long does it last?\" \"Do you have pain now?\"      (Note: Intermittent means the pain goes away completely between bouts)      intermittent    6. SEVERITY: \"How bad is the pain?\"  (e.g., Scale 1-10; mild, moderate, or severe)    - MILD (1-3): doesn't interfere with normal activities, abdomen soft and not tender to touch     - MODERATE (4-7): interferes with normal activities or awakens from sleep, tender to touch     - SEVERE (8-10): excruciating pain, doubled over, unable to do any normal activities       Severe, difficult to stand up straight or lift her right leg up    7. RECURRENT SYMPTOM: \"Have you ever had this type of stomach pain before?\" If Yes, ask: \"When was the last time?\" and \"What happened that time?\"       Yes, ruptured cyst in 2018- feels exactly the same    8. CAUSE: \"What do you think is causing the stomach pain?\"       ? Ruptured cyst    9. RELIEVING/AGGRAVATING FACTORS: \"What makes it better or worse?\" (e.g., movement, antacids, bowel movement)       Warm rice bag helped earlier, helping a little now as well    10. OTHER SYMPTOMS: \"Has there been any vomiting, diarrhea, constipation, or urine problems?\"        Denies " "    11. PREGNANCY: \"Is there any chance you are pregnant?\" \"When was your last menstrual period?\"      Denies, LMP 8/8/24    Had intercourse before this episode.    Protocols used: Abdominal Pain - Female-ADULT-    "

## 2024-08-22 ENCOUNTER — TELEPHONE (OUTPATIENT)
Dept: OBGYN CLINIC | Facility: CLINIC | Age: 34
End: 2024-08-22

## 2024-08-22 DIAGNOSIS — R10.31 RIGHT LOWER QUADRANT ABDOMINAL PAIN: Primary | ICD-10-CM

## 2024-08-22 DIAGNOSIS — Z87.42 HX OF OVARIAN CYST: ICD-10-CM

## 2024-08-27 ENCOUNTER — TELEPHONE (OUTPATIENT)
Age: 34
End: 2024-08-27

## 2024-08-27 NOTE — TELEPHONE ENCOUNTER
Talked with Wander; advised to wait for an ultrasound until her dating US. We check ovaries at the time of the dating US. Wander was okay with this and was happy not to have to get the US.

## 2024-09-23 ENCOUNTER — NURSE TRIAGE (OUTPATIENT)
Age: 34
End: 2024-09-23

## 2024-09-26 ENCOUNTER — NURSE TRIAGE (OUTPATIENT)
Age: 34
End: 2024-09-26

## 2024-09-26 NOTE — TELEPHONE ENCOUNTER
"LMP 8/4/24 7 weeks 4 days   Started Sunday/ Monday with nausea/ diarrhea/ fatigue. Yesterday congestion and tested positive for covid. No fever, no cough no respiratory distress, no concerning symptoms with pregnancy. Reviewed meds safe for pregnancy, Neti pot, nasal saline, humidifier, increase hydration/ PO fluids rest, bland foods and progression of diet. Patient will monitor symptoms and call back if any concerns or if respiratory distress aware to seek ED/ urgent care.   D&V scan 9/30- covid added to appointment note and patient aware to wear mask.   LYNN Overton- reviewed  Reason for Disposition   [1] COVID-19 diagnosed by positive lab test (e.g., PCR, rapid self-test kit) AND [2] NO symptoms (e.g., cough, fever, others)    Answer Assessment - Initial Assessment Questions  1. COVID-19 DIAGNOSIS: \"Who made your COVID-19 diagnosis?\" \"Was it confirmed by a positive lab test or self-test?\" If not diagnosed by a doctor (or NP/PA), ask \"Are there lots of cases (community spread) where you live?\" Note: See public health department website, if unsure.      Home tested  2. COVID-19 EXPOSURE: \"Was there any known exposure to COVID before the symptoms began?\" CDC Definition of close contact: within 6 feet (2 meters) for a total of 15 minutes or more over a 24-hour period.       unsure  3. ONSET: \"When did the COVID-19 symptoms start?\"       Sunday  4. WORST SYMPTOM: \"What is your worst symptom?\" (e.g., cough, fever, shortness of breath, muscle aches)      congestion  5. COUGH: \"Do you have a cough?\" If Yes, ask: \"How bad is the cough?\"        denies  6. FEVER: \"Do you have a fever?\" If Yes, ask: \"What is your temperature, how was it measured, and when did it start?\"      denies  7. RESPIRATORY STATUS: \"Describe your breathing?\" (e.g., shortness of breath, wheezing, unable to speak)       Denies, just congested  8. BETTER-SAME-WORSE: \"Are you getting better, staying the same or getting worse compared to yesterday?\"  If " "getting worse, ask, \"In what way?\"      Does not feel acutely ill  9. HIGH RISK DISEASE: \"Do you have any chronic medical problems?\" (e.g., asthma, heart or lung disease, weak immune system, obesity, etc.)      denies  10. VACCINE: \"Have you had the COVID-19 vaccine?\" If Yes, ask: \"Which one, how many shots, when did you get it?\"        Yes- on record  11. BOOSTER: \"Have you received your COVID-19 booster?\" If Yes, ask: \"Which one and when did you get it?\"        Yes- on record  12. PREGNANCY: \"Is there any chance you are pregnant?\" \"When was your last menstrual period?\"        7 weeks 4 days  13. OTHER SYMPTOMS: \"Do you have any other symptoms?\"  (e.g., chills, fatigue, headache, loss of smell or taste, muscle pain, sore throat)        fatigue    Protocols used: Coronavirus (COVID-19) Diagnosed or Suspected-ADULT-[place]    "

## 2024-09-30 ENCOUNTER — ULTRASOUND (OUTPATIENT)
Dept: OBGYN CLINIC | Facility: MEDICAL CENTER | Age: 34
End: 2024-09-30
Payer: COMMERCIAL

## 2024-09-30 VITALS
HEIGHT: 65 IN | DIASTOLIC BLOOD PRESSURE: 66 MMHG | BODY MASS INDEX: 26.82 KG/M2 | WEIGHT: 161 LBS | SYSTOLIC BLOOD PRESSURE: 112 MMHG

## 2024-09-30 DIAGNOSIS — Z98.891 STATUS POST PRIMARY LOW TRANSVERSE CESAREAN SECTION: ICD-10-CM

## 2024-09-30 DIAGNOSIS — O09.891 PRIOR PREGNANCY COMPLICATED BY PIH, ANTEPARTUM, FIRST TRIMESTER: ICD-10-CM

## 2024-09-30 DIAGNOSIS — B00.9 HERPES SIMPLEX TYPE 1 INFECTION: Primary | ICD-10-CM

## 2024-09-30 DIAGNOSIS — N91.2 AMENORRHEA: ICD-10-CM

## 2024-09-30 PROCEDURE — 76817 TRANSVAGINAL US OBSTETRIC: CPT | Performed by: OBSTETRICS & GYNECOLOGY

## 2024-09-30 PROCEDURE — 99214 OFFICE O/P EST MOD 30 MIN: CPT | Performed by: OBSTETRICS & GYNECOLOGY

## 2024-09-30 RX ORDER — MULTIVITAMIN WITH IRON
100 TABLET ORAL DAILY
COMMUNITY

## 2024-09-30 NOTE — PROGRESS NOTES
Pregnancy Confirmation Visit  [facility] [address], [place], [place]    Assessment/Plan:  34 y.o.  presenting with missed menses.      Patient's last menstrual period was 2024 (exact date).  EDC - 25   @ 8w1d  Sonogram EDC  25      Final EDC 25    by    LMP            - Continue/start prenatal vitamin  - We reviewed her current medications and discussed which are safe to continue in pregnancy  - We briefly discussed options for aneuploidy screening, to be discussed further at the prenatal intake  - Schedule prenatal intake with RN and initial prenatal visit; prenatal labs will be ordered during the prenatal intake      Subjective:    CC: Missed period    Wander Barker is a 34 y.o.  who presents with missed menses.  Patient's last menstrual period was 2024 (exact date).    Patient notes that this pregnancy was planned and desired.  She was not using contraception at the time of conception. She reports she is certain of her LMP and that she has regular menses. She has has no vaginal bleeding since her LMP.      Problem List Items Addressed This Visit       Herpes simplex type 1 infection - Primary     Will need suppression at 35 weeks          Status post primary low transverse  section     2023- suspected macrosomia - only 7.6           Prior pregnancy complicated by PIH, antepartum, first trimester     Baseline labs           Other Visit Diagnoses       Amenorrhea        Relevant Orders    Ambulatory Referral to Maternal Fetal Medicine    HIV 1/2 AG/AB w Reflex [facility] for 2 yr old and above    Hepatitis C antibody    UA (URINE) with reflex to Scope    Urine culture    Hepatitis B surface antigen    CBC and differential    Rubella antibody, IgG    Type and screen    RPR-Syphilis Screening (Total Syphilis IGG/IGM)    Hepatitis B surface antibody    Anemia Panel w/Reflex, OB    Hemoglobin (Hb) Solubility With Reflex to Hemoglobin  "Fractionation, Blood    Comprehensive metabolic panel    Protein / creatinine ratio, urine                Objective:  /66   Ht 5' 5\" (1.651 m)   Wt 73 kg (161 lb)   LMP 08/04/2024 (Exact Date)   Breastfeeding No   BMI 26.79 kg/m²     Physical Exam:  General: Well appearing, no distress  CV: Regular rate  Respiratory: Unlabored breathing  Abdomen: Soft, nontender  Extremities: Without edema  Mood and Affect: Appropriate    Transvaginal Pelvic Ultrasound  Jane IUP  Yolk sac: Present  Fetal Pole: Present  CRL consistent with EGA 5/7/25  Cardiac activity: Present   bpm  No adnexal masses appreciated            "

## 2024-10-04 NOTE — PROGRESS NOTES
OB INTAKE INTERVIEW 2024    Patient is 34 y.o. who presents for OB intake at 9w1d  She is accompanied by her spouse during this encounter.  The father of her baby (Sergio) is involved in the pregnancy.      Patient's last menstrual period was 2024 (exact date).  Ultrasound: Measured 8 weeks 5 days on 2024  Estimated Date of Delivery: 25 confirmed by dating ultrasound.    Signs/Symptoms of Pregnancy  Current pregnancy symptoms: fatigue and nausea  Constipation no  Headaches YES  Cramping/spotting no  PICA cravings no    Diabetes-  Body mass index is 26.66 kg/m².  If patient has 1 or more, please order early 1 hour GTT  History of GDM no  BMI >35 no  History of PCOS or current metformin use no  History of LGA/macrosomic infant (4000g/9lbs) no    If patient has 2 or more, please order early 1 hour GTT  BMI>30 no  AMA no  First degree relative with type 2 diabetes no  History of chronic HTN, hyperlipidemia, elevated A1C no  High risk race (, , ,  or ) no    Hypertension- if you answer yes to any of the following, please order baseline preeclampsia labs (cbc, comprehensive metabolic panel, urine protein creatinine ratio, uric acid)  History of of chronic HTN no  History of gestational HTN YES  History of preeclampsia, eclampsia, or HELLP syndrome no  History of diabetes no  History of lupus, autoimmune disease, kidney disease no    Thyroid- if yes order TSH with reflex T4  History of thyroid disease no    Bleeding Disorder or Hx of DVT-patient or first degree relative with history of. Order the following if not done previously.   (Factor V, antithrombin III, prothrombin gene mutation, protein C and S Ag, lupus anticoagulant, anticardiolipin, beta-2 glycoprotein)   no    OB/GYN-  History of abnormal pap smear no       Date of last pap smear 2024  History of HPV no  History of Herpes/HSV YES - h/o HSV 1 - Discussed with provider  at D&V will need suppression in third trimester.  History of other STI (gonorrhea, chlamydia, trich) no  History of prior  no  History of prior  YES  History of  delivery prior to 36 weeks 6 days no  History of blood transfusion no  Ok for blood transfusion YES    Substance screening-   History of tobacco use no  Currently using tobacco no  Currently using alcohol no  Presently using drugs no  Past drug use  no  IV drug use- no  Partner drug use no  Parent/Family drug use no  Substance Use Screen Level - no risk    MRSA Screening-   Does the pt have a hx of MRSA? no    Immunizations:  Influenza vaccine given this season NO - Will get at pharmacy.  Discussed Tdap vaccine YES   COVID Vaccine x5 - Patient recently recovered from COVID.    Genetic/[facility]-  Do you or your partner have a history of any of the following in yourselves or first degree relatives?  Cystic fibrosis no  Spinal muscular atrophy no  Hemoglobinopathy/Sickle Cell/Thalassemia no  Fragile X Intellectual Disability no    If no, discuss Carrier Screening being completed once in a lifetime as a standard of care lab test. Place orders for Cystic Fibrosis Gene Test (VDF361) and Spinal Muscular Atrophy DNA ([id number]).  Patient does desire testing for Cystic Fibrosis and Spinal Muscular Atrophy.  Orders placed.    Appointment for Nuchal Translucency Ultrasound at [facility] is scheduled for 10/30.    Interview education  [facility] Pregnancy Essentials Book reviewed, discussed and attached to their AVS YES     Prenatal lab work scripts - ordered at D&V US  Extra labs ordered: Cystic Fibrosis gene test and Spinal muscular atrophy DNA    Aspirin/Preeclampsia Screen    Risk Level Risk Factor Recommendation   LOW Prior Uncomplicated full-term delivery YES No Aspirin recommendation        MODERATE Nulliparity no Recommend low-dose aspirin if     BMI>30 no 2 or more moderate risk factors    Family History Preeclampsia (mother/sister) no     35yr old or  greater no      or Low Socioeconomic no     IVF Pregnancy  no     Personal History Risks (low birth weight, prior adverse preg outcome, >10yr preg interval) no         HIGH History of Preeclampsia no Recommend low-dose aspirin if     Multifetal gestation no 1 or more high risk factors    Chronic HTN no     Type 1 or 2 Diabetes no     Renal Disease no     Autoimmune Disease  no      Contraindications to ASA therapy:  NSAID/ ASA allergy: no  Nasal polyps: no  Asthma with history of ASA induced bronchospasm: no  Relative contraindications:  History of GI bleed: no  Active peptic ulcer disease: no  Severe hepatic dysfunction: no    Patient does not meet recommendation to take ASA 162mg during this pregnancy from 12-36wks to lower her risk of preeclampsia.    The patient has a history now or in prior pregnancy notable for: gestational HTN    Details that I feel the provider should be aware of: Wander came in for her OB intake at 9w1d. Patient c/o fatigue and nausea. Care advise given. Patient encouraged to eat small frequent meals throughout the day. Safe medications to take during pregnancy and warning signs reviewed. Patient with h/o gHTN, 1LTCS in . Patient requesting repeat  and tubal ligation. Prenatal panel, CMP and PC ratio ordered at D&V visit. Will complete labs prior to next appointment. Patient has NT scheduled with MFM on 10/30.    PN1 visit scheduled. The patient was oriented to our practice, the navigator role, reviewed delivering physicians and [facility] for delivery. All questions were answered.    Interviewed by: Susan Montiel RN

## 2024-10-07 ENCOUNTER — INITIAL PRENATAL (OUTPATIENT)
Dept: OBGYN CLINIC | Facility: MEDICAL CENTER | Age: 34
End: 2024-10-07

## 2024-10-07 VITALS
BODY MASS INDEX: 26.69 KG/M2 | SYSTOLIC BLOOD PRESSURE: 110 MMHG | HEIGHT: 65 IN | WEIGHT: 160.2 LBS | DIASTOLIC BLOOD PRESSURE: 64 MMHG

## 2024-10-07 DIAGNOSIS — Z31.430 ENCOUNTER OF FEMALE FOR TESTING FOR GENETIC DISEASE CARRIER STATUS FOR PROCREATIVE MANAGEMENT: ICD-10-CM

## 2024-10-07 DIAGNOSIS — Z34.81 PRENATAL CARE, SUBSEQUENT PREGNANCY, FIRST TRIMESTER: Primary | ICD-10-CM

## 2024-10-07 PROCEDURE — OBC

## 2024-10-07 RX ORDER — ACETAMINOPHEN 500 MG
500 TABLET ORAL EVERY 6 HOURS PRN
COMMUNITY

## 2024-10-09 ENCOUNTER — TELEPHONE (OUTPATIENT)
Age: 34
End: 2024-10-09

## 2024-10-09 NOTE — TELEPHONE ENCOUNTER
Carloee from [employer] called in requesting clarification on 2 of the labs that were ordered for patient.     Unable to locate comparable tests in their system for Hemoglobin (Hb) Solubility With Reflex to Hemoglobin Fractionation, Blood, and Anemia Panel w/Reflex, OB- reports this comes up as pernicious anemia and unsure if that is what is being asked for.     Please advise if there is an alternate order that can be placed for quest for these tests?    Carolee from [employer] can be reached back at [phone].

## 2024-10-10 LAB
ALBUMIN SERPL-MCNC: 4.3 G/DL (ref 3.6–5.1)
ALBUMIN/GLOB SERPL: 1.6 (CALC) (ref 1–2.5)
ALP SERPL-CCNC: 53 U/L (ref 31–125)
ALT SERPL-CCNC: 12 U/L (ref 6–29)
APPEARANCE UR: ABNORMAL
AST SERPL-CCNC: 15 U/L (ref 10–30)
BASOPHILS # BLD AUTO: 48 CELLS/UL (ref 0–200)
BASOPHILS NFR BLD AUTO: 0.7 %
BILIRUB SERPL-MCNC: 0.4 MG/DL (ref 0.2–1.2)
BILIRUB UR QL STRIP: NEGATIVE
BUN SERPL-MCNC: 6 MG/DL (ref 7–25)
BUN/CREAT SERPL: 10 (CALC) (ref 6–22)
CALCIUM SERPL-MCNC: 9.1 MG/DL (ref 8.6–10.2)
CHLORIDE SERPL-SCNC: 104 MMOL/L (ref 98–110)
CO2 SERPL-SCNC: 25 MMOL/L (ref 20–32)
COLOR UR: YELLOW
CREAT SERPL-MCNC: 0.59 MG/DL (ref 0.5–0.97)
CREAT UR-MCNC: 121 MG/DL (ref 20–275)
EOSINOPHIL # BLD AUTO: 27 CELLS/UL (ref 15–500)
EOSINOPHIL NFR BLD AUTO: 0.4 %
ERYTHROCYTE [DISTWIDTH] IN BLOOD BY AUTOMATED COUNT: 12.3 % (ref 11–15)
GFR/BSA.PRED SERPLBLD CYS-BASED-ARV: 122 ML/MIN/1.73M2
GLOBULIN SER CALC-MCNC: 2.7 G/DL (CALC) (ref 1.9–3.7)
GLUCOSE SERPL-MCNC: 89 MG/DL (ref 65–99)
GLUCOSE UR QL STRIP: NEGATIVE
HBV SURFACE AB SER QL IA: NORMAL
HBV SURFACE AG SERPL QL IA: NORMAL
HCT VFR BLD AUTO: 36.5 % (ref 35–45)
HCV AB SERPL QL IA: NORMAL
HGB BLD-MCNC: 12.5 G/DL (ref 11.7–15.5)
HGB UR QL STRIP: NEGATIVE
HIV 1+2 AB+HIV1 P24 AG SERPL QL IA: NORMAL
KETONES UR QL STRIP: NEGATIVE
LEUKOCYTE ESTERASE UR QL STRIP: NEGATIVE
LYMPHOCYTES # BLD AUTO: 2278 CELLS/UL (ref 850–3900)
LYMPHOCYTES NFR BLD AUTO: 33.5 %
MCH RBC QN AUTO: 31.1 PG (ref 27–33)
MCHC RBC AUTO-ENTMCNC: 34.2 G/DL (ref 32–36)
MCV RBC AUTO: 90.8 FL (ref 80–100)
MONOCYTES # BLD AUTO: 422 CELLS/UL (ref 200–950)
MONOCYTES NFR BLD AUTO: 6.2 %
NEUTROPHILS # BLD AUTO: 4026 CELLS/UL (ref 1500–7800)
NEUTROPHILS NFR BLD AUTO: 59.2 %
NITRITE UR QL STRIP: NEGATIVE
PH UR STRIP: 7.5 [PH] (ref 5–8)
PLATELET # BLD AUTO: 274 THOUSAND/UL (ref 140–400)
PMV BLD REES-ECKER: 10.5 FL (ref 7.5–12.5)
POTASSIUM SERPL-SCNC: 4.1 MMOL/L (ref 3.5–5.3)
PROT SERPL-MCNC: 7 G/DL (ref 6.1–8.1)
PROT UR QL STRIP: NEGATIVE
PROT UR-MCNC: 9 MG/DL (ref 5–24)
PROT/CREAT UR: 0.07 MG/MG CREAT (ref 0.02–0.18)
PROT/CREAT UR: 74 MG/G CREAT (ref 24–184)
RBC # BLD AUTO: 4.02 MILLION/UL (ref 3.8–5.1)
RPR SER QL: NORMAL
RUBV IGG SERPL IA-ACNC: 1.73 INDEX
SODIUM SERPL-SCNC: 137 MMOL/L (ref 135–146)
SP GR UR STRIP: 1.01 (ref 1–1.03)
WBC # BLD AUTO: 6.8 THOUSAND/UL (ref 3.8–10.8)

## 2024-10-14 LAB
ERYTHROCYTE [DISTWIDTH] IN BLOOD BY AUTOMATED COUNT: 12.4 % (ref 11–15)
FERRITIN SERPL-MCNC: 61 NG/ML (ref 16–154)
HCT VFR BLD AUTO: 36.6 % (ref 35–45)
HGB A MFR BLD: 97.3 %
HGB A2 MFR BLD: 2.7 % (ref 2–3.2)
HGB BLD-MCNC: 12.5 G/DL (ref 11.7–15.5)
HGB F MFR BLD: <1 %
HGB FRACT BLD-IMP: NORMAL
MCH RBC QN AUTO: 31.4 PG (ref 27–33)
MCV RBC AUTO: 92 FL (ref 80–100)
RBC # BLD AUTO: 3.98 MILLION/UL (ref 3.8–5.1)

## 2024-10-26 ENCOUNTER — NURSE TRIAGE (OUTPATIENT)
Dept: OTHER | Facility: OTHER | Age: 34
End: 2024-10-26

## 2024-10-26 NOTE — TELEPHONE ENCOUNTER
"Regarding: safe to take amox while preg?  ----- Message from Amy THOMPSON sent at 10/26/2024  9:01 AM EDT -----  \"I am pregnant and went to the [facility] urgent care and they gave me an antibiotic for a sinus infection--amoxicillin. They said it was safe but I want to make sure\"    "

## 2024-10-26 NOTE — TELEPHONE ENCOUNTER
"Reason for Disposition  • [1] Caller has URGENT medicine question about med that PCP or specialist prescribed AND [2] triager unable to answer question    Answer Assessment - Initial Assessment Questions  1. NAME of MEDICINE: \"What medicine(s) are you calling about?\"      Amoxicillian 875mg  2. QUESTION: \"What is your question?\" (e.g., double dose of medicine, side effect)      Is this safe for pregnancy    Patient is GA: 11w6d and prescribed medication through an urgent care. Patient requested clarification as to whether this was safe for pregnancy.     On call Provider confirmed this is a safe medication. Patient informed and thankful.    Protocols used: Medication Question Call-Adult-    "

## 2024-10-27 ENCOUNTER — NURSE TRIAGE (OUTPATIENT)
Dept: OTHER | Facility: OTHER | Age: 34
End: 2024-10-27

## 2024-10-27 NOTE — TELEPHONE ENCOUNTER
"Reason for Disposition  • Caller requesting information about medicine during pregnancy; adult is not ill AND triager answers question    Answer Assessment - Initial Assessment Questions  1. NAME of MEDICINE: \"What medicine(s) are you calling about?\"      Robitussin  2. QUESTION: \"What is your question?\" (e.g., double dose of medicine, side effect)      Pt would like to know if she can take Robitussin during pregnancy. Pt has a list from her ob that says she can but she wasn't sure which kind to take because there are multiple kinds.     Pt told it would have to be the plain kind with only guaifenesin. Pt told that if she cannot find that she should just use cough drops.    4. SYMPTOMS: \"Do you have any symptoms?\" If Yes, ask: \"What symptoms are you having?\"  \"How bad are the symptoms (e.g., mild, moderate, severe)      Cough, sinus infection  5. PREGNANCY:  \"Is there any chance that you are pregnant?\" \"When was your last menstrual period?\"      12 weeks    Protocols used: Medication Question Call-Adult-    "

## 2024-10-27 NOTE — TELEPHONE ENCOUNTER
"Regardin Weeks Pregnant, Robitussin Cough Medication Question  ----- Message from Albina WANG sent at 10/27/2024  2:52 PM EDT -----  \" I am 12 Weeks Pregnant and want to know what kind of Robitussin Cough Syrup I can take.\"    "

## 2024-10-29 NOTE — PROGRESS NOTES
Prenatal H&P     Denies loss of fluid, vaginal discharge, vaginal bleeding  and abdominal pain. Not feeling fetal movement. Tolerating PNV well.   Prenatal panel reviewed - WNL.  Completed MaterniT 21 for genetic screening. Planned pregnancy.  Recently recovering from COVID and sinus infection.    OB history:     G1- 23 term female 7lb 6.2oz; primary  for suspected macrosomia.  Denies other complications with pregnancy or postpartum     G2- current    Dating:     LMP - 24 JOSE 25     US on 24 @  8w 5d JOSE 25  Working JOSE 25     Surgical history:       Past Surgical History:   Procedure Laterality Date    ADENOIDECTOMY      Last Assessed: 2014    DENTAL SURGERY      Last Assessed: 2014    MYRINGOTOMY      Last Assessed: 2014    TN  DELIVERY ONLY N/A 2023    Procedure:  SECTION ();  Surgeon: Monet Obrien MD;  Location: [facility];  Service: Obstetrics       Medical History:  Past Medical History:   Diagnosis Date    Anemia [id number]    Asthma     Hypercholesterolemia     Last Assessed: 2014    Varicella        Social history:     Alcohol/ tobacco/ illicit drug rare alcohol use prior to pregnancy/denies drug or tobacco use     Denies history of STD/STI     Work/ housing/ / house- stable/ FOB, family and friends supportive     PCP/ Dental last PE summer 2023/ last cleaning winter/ spring 2024     SATHYA no  risk     She denies a hx of recent chills, fever,  STD/STI, denies a personal history  of TB or  Zika virus or close contacts with persons with TB or COVID -19. She denies a family history of inheritable conditions such as physical or intellectual disabilities, birth defects, blood disorders, heart or neural tube defects. She has never had MRSA. She denies recent travel or travel planned in the near future.     Patient Plans   - Feeding breast-feeding  - Contraception permanent female sterilization  - Aware office delivers  at [facility]. Reviewed depending on complaint and gestational age may recommend evaluation at [facility]    Plan:  - Continue prenatal vitamin daily  - Genetic screening MSAFP ordered  - [facility] follow-up scheduled 24  -Prior  section planning repeat  section with permanent female sterilization- [facility]   -COVID-19 in pregnancy 2024  - history of gHTN reviewed recommendation for low-dose aspirin.  Patient states MFM did not feel strongly regarding aspirin use  -  Weight gain in pregnancy- Who BMI recommend  15-25 lb. Healthy diet and daily exercise reviewed and encouraged  - Unit regimen reviewed visit every 4 weeks until 28 weeks, every 2 weeks until 36 weeks and then weekly until delivery.    - gonorrhea/chlamydia collected.  Pap smear UTD  - Vaccines in Pregnancy      - TDAP vaccine after 27 gestational weeks     - RSV vaccine between 32-36.6 gestational weeks. September- January      - Reviewed with patient  Pregnancy with COVID infection is considered  high risk and can have poor outcome including  delivery, more severe infection.  therefore  pregnant patients are recommended to get  COVID-19 vaccination. Written information provided     - influenza October- March declines at today's visit  -  Common discomforts of pregnancy and precautions reviewed.  Signs and symptoms to report reviewed.      RTO 4 weeks

## 2024-10-30 ENCOUNTER — TELEPHONE (OUTPATIENT)
Age: 34
End: 2024-10-30

## 2024-10-30 ENCOUNTER — ROUTINE PRENATAL (OUTPATIENT)
Age: 34
End: 2024-10-30
Payer: COMMERCIAL

## 2024-10-30 VITALS
HEIGHT: 65 IN | WEIGHT: 162.8 LBS | HEART RATE: 85 BPM | SYSTOLIC BLOOD PRESSURE: 110 MMHG | BODY MASS INDEX: 27.12 KG/M2 | DIASTOLIC BLOOD PRESSURE: 70 MMHG

## 2024-10-30 DIAGNOSIS — Z36.0 ENCOUNTER FOR ANTENATAL SCREENING FOR CHROMOSOMAL ANOMALIES: ICD-10-CM

## 2024-10-30 DIAGNOSIS — Z3A.12 12 WEEKS GESTATION OF PREGNANCY: Primary | ICD-10-CM

## 2024-10-30 DIAGNOSIS — O34.219 HISTORY OF CESAREAN DELIVERY, ANTEPARTUM: ICD-10-CM

## 2024-10-30 DIAGNOSIS — N91.2 AMENORRHEA: ICD-10-CM

## 2024-10-30 DIAGNOSIS — Z36.82 ENCOUNTER FOR (NT) NUCHAL TRANSLUCENCY SCAN: ICD-10-CM

## 2024-10-30 PROBLEM — Z87.59 HISTORY OF GESTATIONAL HYPERTENSION: Status: ACTIVE | Noted: 2024-09-30

## 2024-10-30 PROCEDURE — 36415 COLL VENOUS BLD VENIPUNCTURE: CPT | Performed by: OBSTETRICS & GYNECOLOGY

## 2024-10-30 PROCEDURE — 76813 OB US NUCHAL MEAS 1 GEST: CPT | Performed by: OBSTETRICS & GYNECOLOGY

## 2024-10-30 PROCEDURE — 99213 OFFICE O/P EST LOW 20 MIN: CPT | Performed by: OBSTETRICS & GYNECOLOGY

## 2024-10-30 PROCEDURE — 76801 OB US < 14 WKS SINGLE FETUS: CPT | Performed by: OBSTETRICS & GYNECOLOGY

## 2024-10-30 RX ORDER — AMOXICILLIN 250 MG/1
CAPSULE ORAL EVERY 8 HOURS SCHEDULED
COMMUNITY
End: 2024-11-04

## 2024-10-30 NOTE — TELEPHONE ENCOUNTER
Patient is currently 12 weeks 3 days pregnant,  called office stating she was prescribed Cefdinir for sinus infection. She would like to confirm this is safe to take in pregnancy.   She was previously taking Amoxicillin until yesterday, the Amoxicillin was discontinued and she will be starting Cefdinir today.   She requests [employer] message with response.

## 2024-10-30 NOTE — LETTER
"2024     Clemente Lopez MD  [address] [place] [address]  [address] [place] [id number]    Patient: Wander Barker   YOB: 1990   Date of Visit: 10/30/2024       Dear Dr. Lopez:    Thank you for referring Wander Barker to me for evaluation. Below are my notes for this consultation.    If you have questions, please do not hesitate to call me. I look forward to following your patient along with you.         Sincerely,        Gerri Dockery MD        CC: No Recipients    Gerri Dockery MD  10/30/2024  3:46 PM  Sign when Signing Visit  [facility]: Ms. Barker was seen today at 12w3d for nuchal translucency ultrasound.  See ultrasound report under \"OB Procedures\" tab.      My recommendations are as follows:  We reviewed the availability of aneuploidy screening, as well as diagnostic testing, which are available to all pregnant women. We reviewed limitations, risks, and benefits of screening and testing. She elected to proceed with Non Invasive Prenatal Screening (NIPS). MSAFP screening should be ordered through your office at 15-20 weeks gestation, and completed prior to fetal anatomic survey. She does not wish to pursue diagnostic testing at this time. A detailed anatomic survey as well as transvaginal cervical length screening are recommended around 20 weeks gestation.  Please offer carrier screening per ACOG guidelines.     Please don't hesitate to contact our office with any concerns or questions.    -Gerri Dockery MD  "

## 2024-10-30 NOTE — PROGRESS NOTES
"[facility]: Ms. Barker was seen today at 12w3d for nuchal translucency ultrasound.  See ultrasound report under \"OB Procedures\" tab.      My recommendations are as follows:  We reviewed the availability of aneuploidy screening, as well as diagnostic testing, which are available to all pregnant women. We reviewed limitations, risks, and benefits of screening and testing. She elected to proceed with Non Invasive Prenatal Screening (NIPS). MSAFP screening should be ordered through your office at 15-20 weeks gestation, and completed prior to fetal anatomic survey. She does not wish to pursue diagnostic testing at this time. A detailed anatomic survey as well as transvaginal cervical length screening are recommended around 20 weeks gestation.  Please offer carrier screening per ACOG guidelines.     Please don't hesitate to contact our office with any concerns or questions.    -Gerri Dockery MD  "

## 2024-10-30 NOTE — PROGRESS NOTES
Patient chose to have [facility] [id number] Non-Invasive Prenatal Screen [id number] PLUS w/ SCA, WITH fetal sex.  Patient choose to be billed through insurance.     Patient given brochure and is aware [facility] will contact patient's insurance and coordinate coverage.  Provided [facility] contact information. General inquiries [phone], Cost estimates [phone] and [facility] Billing [phone]. Website [email or web address].     Blood collection tubes labeled with patient identifiers (name, medical record number, and date of birth).     Filled out [facility] order form. Patient chose to have blood drawn in our office at time of visit. NIPS was drawn from left arm with a butterfly needle by Loyda HALE .      Maternal Fetal Medicine will have results in approximately 5-7 business days and will call patient or notify via [employer]ason.  Patient aware viewing lab result online will reveal fetal sex if ordered.    Patient verbalized understanding of all instructions and no questions at this time.

## 2024-11-03 LAB
CFDNA.FET/CFDNA.TOTAL SFR FETUS: NORMAL %
CITATION REF LAB TEST: NORMAL
FET 13+18+21+X+Y ANEUP PLAS.CFDNA: NEGATIVE
FET CHR 21 TS PLAS.CFDNA QL: NEGATIVE
FET CHR 21 TS PLAS.CFDNA QL: NEGATIVE
FET MS X RISK WBC.DNA+CFDNA QL: NOT DETECTED
FET SEX PLAS.CFDNA DOSAGE CFDNA: NORMAL
FET TS 13 RISK PLAS.CFDNA QL: NEGATIVE
FET X + Y ANEUP RISK PLAS.CFDNA SEQ-IMP: NOT DETECTED
GA EST FROM CONCEPTION DATE: NORMAL D
GESTATIONAL AGE > 9:: YES
LAB DIRECTOR NAME PROVIDER: NORMAL
LAB DIRECTOR NAME PROVIDER: NORMAL
LABORATORY COMMENT REPORT: NORMAL
LIMITATIONS OF THE TEST: NORMAL
NEGATIVE PREDICTIVE VALUE: NORMAL
PERFORMANCE CHARACTERISTICS: NORMAL
POSITIVE PREDICTIVE VALUE: NORMAL
REF LAB TEST METHOD: NORMAL
SL AMB NOTE:: NORMAL
TEST PERFORMANCE INFO SPEC: NORMAL

## 2024-11-04 ENCOUNTER — INITIAL PRENATAL (OUTPATIENT)
Dept: OBGYN CLINIC | Facility: CLINIC | Age: 34
End: 2024-11-04

## 2024-11-04 VITALS — DIASTOLIC BLOOD PRESSURE: 70 MMHG | BODY MASS INDEX: 27.49 KG/M2 | WEIGHT: 165.2 LBS | SYSTOLIC BLOOD PRESSURE: 110 MMHG

## 2024-11-04 DIAGNOSIS — Z11.3 SCREENING EXAMINATION FOR STI: ICD-10-CM

## 2024-11-04 DIAGNOSIS — O34.219 PREVIOUS CESAREAN SECTION COMPLICATING PREGNANCY: ICD-10-CM

## 2024-11-04 DIAGNOSIS — U07.1 COVID-19 AFFECTING PREGNANCY IN FIRST TRIMESTER: ICD-10-CM

## 2024-11-04 DIAGNOSIS — Z3A.13 13 WEEKS GESTATION OF PREGNANCY: ICD-10-CM

## 2024-11-04 DIAGNOSIS — O98.511 COVID-19 AFFECTING PREGNANCY IN FIRST TRIMESTER: ICD-10-CM

## 2024-11-04 DIAGNOSIS — O09.891 PRIOR PREGNANCY COMPLICATED BY PIH, ANTEPARTUM, FIRST TRIMESTER: Primary | ICD-10-CM

## 2024-11-04 DIAGNOSIS — Z36.1 ENCOUNTER FOR ANTENATAL SCREENING FOR HIGH ALPHA-FETOPROTEIN LEVEL: ICD-10-CM

## 2024-11-04 PROBLEM — Z36.82 ENCOUNTER FOR (NT) NUCHAL TRANSLUCENCY SCAN: Status: RESOLVED | Noted: 2024-10-30 | Resolved: 2024-11-04

## 2024-11-04 PROBLEM — R55 NEAR SYNCOPE: Status: RESOLVED | Noted: 2023-01-06 | Resolved: 2024-11-04

## 2024-11-04 PROCEDURE — PNV: Performed by: NURSE PRACTITIONER

## 2024-11-04 PROCEDURE — 87591 N.GONORRHOEAE DNA AMP PROB: CPT | Performed by: NURSE PRACTITIONER

## 2024-11-04 PROCEDURE — 87491 CHLMYD TRACH DNA AMP PROBE: CPT | Performed by: NURSE PRACTITIONER

## 2024-11-04 RX ORDER — AMOXICILLIN 875 MG/1
TABLET, COATED ORAL
COMMUNITY
Start: 2024-10-26 | End: 2024-11-04

## 2024-11-04 RX ORDER — CEFDINIR 300 MG/1
CAPSULE ORAL
COMMUNITY
Start: 2024-11-01 | End: 2024-11-04

## 2024-11-05 LAB
C TRACH DNA SPEC QL NAA+PROBE: NEGATIVE
N GONORRHOEA DNA SPEC QL NAA+PROBE: NEGATIVE

## 2024-11-05 NOTE — RESULT ENCOUNTER NOTE
Advised patient via mychart of low risk NIPS result, and reminded to complete MSAFP (already ordered).

## 2024-11-19 ENCOUNTER — PATIENT MESSAGE (OUTPATIENT)
Dept: OBGYN CLINIC | Facility: CLINIC | Age: 34
End: 2024-11-19

## 2024-11-22 ENCOUNTER — TELEPHONE (OUTPATIENT)
Age: 34
End: 2024-11-22

## 2024-11-22 DIAGNOSIS — B00.9 HSV INFECTION: ICD-10-CM

## 2024-11-22 RX ORDER — VALACYCLOVIR HYDROCHLORIDE 500 MG/1
500 TABLET, FILM COATED ORAL 2 TIMES DAILY
Qty: 90 TABLET | Refills: 3 | Status: SHIPPED | OUTPATIENT
Start: 2024-11-22 | End: 2025-11-17

## 2024-11-22 NOTE — TELEPHONE ENCOUNTER
Pt. Sent a myc message asking if valtrex can be sent in for her. It would be 2 500mg pills daily. Dr. Obrien said it was okay to refill and sent it to her clinical pod which then routed back to us. I am not able to sent a refill request as it is not a current med. Can you please send for patient?

## 2024-11-25 NOTE — TELEPHONE ENCOUNTER
Call to pt and reviewed that script was sent. Pt is requesting an updated pharmacy moving forward- [facility] [address]. Pt states she is okay picking this prescription up at ordered pharmacy at this time. Pt thankful.

## 2024-11-26 ENCOUNTER — APPOINTMENT (OUTPATIENT)
Dept: LAB | Facility: HOSPITAL | Age: 34
End: 2024-11-26
Attending: OBSTETRICS & GYNECOLOGY
Payer: COMMERCIAL

## 2024-11-26 DIAGNOSIS — Z31.430 ENCOUNTER OF FEMALE FOR TESTING FOR GENETIC DISEASE CARRIER STATUS FOR PROCREATIVE MANAGEMENT: ICD-10-CM

## 2024-11-26 PROCEDURE — 81329 SMN1 GENE DOS/DELETION ALYS: CPT

## 2024-11-26 PROCEDURE — 81220 CFTR GENE COM VARIANTS: CPT

## 2024-11-26 PROCEDURE — 36415 COLL VENOUS BLD VENIPUNCTURE: CPT

## 2024-12-02 LAB
CITATION REF LAB TEST: NORMAL
CLINICAL INFO: NORMAL
ETHNIC BACKGROUND STATED: NORMAL
GENE DIS ANL CARRIER INTERP-IMP: NORMAL
GENE MUT TESTED BLD/T: NORMAL
LAB DIRECTOR NAME PROVIDER: NORMAL
REASON FOR REFERRAL (NARRATIVE): NORMAL
RECOMMENDATION PATIENT DOC-IMP: NORMAL
REF LAB TEST METHOD: NORMAL
SERVICE CMNT-IMP: NORMAL
SMN1 GENE MUT ANL BLD/T: NORMAL
SPECIMEN SOURCE: NORMAL

## 2024-12-05 ENCOUNTER — ROUTINE PRENATAL (OUTPATIENT)
Dept: OBGYN CLINIC | Facility: CLINIC | Age: 34
End: 2024-12-05

## 2024-12-05 VITALS — DIASTOLIC BLOOD PRESSURE: 70 MMHG | SYSTOLIC BLOOD PRESSURE: 112 MMHG | WEIGHT: 165.8 LBS | BODY MASS INDEX: 27.59 KG/M2

## 2024-12-05 DIAGNOSIS — O34.219 HISTORY OF CESAREAN DELIVERY, ANTEPARTUM: ICD-10-CM

## 2024-12-05 DIAGNOSIS — B00.9 HSV INFECTION: ICD-10-CM

## 2024-12-05 DIAGNOSIS — Z3A.17 17 WEEKS GESTATION OF PREGNANCY: Primary | ICD-10-CM

## 2024-12-05 PROCEDURE — PNV: Performed by: OBSTETRICS & GYNECOLOGY

## 2024-12-05 NOTE — PROGRESS NOTES
Wander is a 34 y.o. year old  at 17w4d for routine prenatal visit.   + FM, no vaginal bleeding, contractions, or LOF  Complaints: No   AFP discussed and ordered   Interested  in repeat  section on  2025  Most recent ultrasound and labs reviewed.  Has level 2 scheduled

## 2024-12-06 ENCOUNTER — TELEPHONE (OUTPATIENT)
Age: 34
End: 2024-12-06

## 2024-12-06 NOTE — TELEPHONE ENCOUNTER
Attempted to contact pt to discuss scheduled C/S date/time. Pt unavailable.  Left detailed message that her C/S will be at [facility] 5/6/[facility] at 7:30am.  Instructed to contact office with any questions.  And to make PPV at next PNV   ----- Message from Karmen Sunshine MD sent at 12/5/2024  5:32 PM EST -----  Regarding: repeat C/s and  tubal  Procedure to be scheduled (IOL or CS): C/S     JOSE: Estimated Date of Delivery: 5/11/25     Indication for delivery: repeat C/S     Requested date (s) of delivery: 5/6/2025   If requested date is unavailable, is there a date by which the pt must be delivered?    Physician preference: arsh [facility]    Cervical Exam Not evaluated.      If CS, with or without tubal: with tubal

## 2024-12-12 LAB
CFTR FULL MUT ANL BLD/T SEQ: NORMAL
CITATION REF LAB TEST: NORMAL
CLINICAL INFO: NORMAL
ETHNIC BACKGROUND STATED: NORMAL
GENE DIS ANL CARRIER INTERP-IMP: NORMAL
INDICATION: NORMAL
LAB DIRECTOR NAME PROVIDER: NORMAL
RECOMMENDATION PATIENT DOC-IMP: NORMAL
REF LAB TEST METHOD: NORMAL
SERVICE CMNT-IMP: NORMAL
SPECIMEN SOURCE: NORMAL

## 2024-12-16 ENCOUNTER — TELEPHONE (OUTPATIENT)
Dept: OBGYN CLINIC | Facility: MEDICAL CENTER | Age: 34
End: 2024-12-16

## 2024-12-16 NOTE — TELEPHONE ENCOUNTER
2ND TRIMESTER CHECK-IN CALL      Overall how are you doing? Patient reports to be doing well and has no current concerns.     Compliant with routine OB care appointments? Yes.     Have you completed your 1st trimester labs? Yes.     If you had NIPS with MFM, do you have a order for MSAFP? Order in chart. Time-frame reviewed with patient. Patient will go this Wednesday to get it completed.     Have you seen MFM and do you have your detailed US scheduled? Patient has her anatomy scan scheduled 12/26.     Pregnancy Education-have you had a chance to review the classes and hospital tour offered and registered? Patient interested in hospital tour. Registration information sent via WebVisible.

## 2024-12-20 LAB
# FETUSES US: 1
AFP ADJ MOM SERPL: 1.1
AFP INTERP SERPL-IMP: NORMAL
AFP SERPL-MCNC: 57.3 NG/ML
AGE: NORMAL
DONATED EGG PATIENT QL: NO
GA CLIN EST: 19.6 WEEKS
GA METHOD: NORMAL
HX OF NTD NARR: NO
HX OF TRISOMY 21 NARR: NO
IDDM PATIENT QL: NO
NEURAL TUBE DEFECT RISK FETUS: NORMAL %
SL AMB REPEAT SPECIMEN: NO

## 2024-12-22 ENCOUNTER — RESULTS FOLLOW-UP (OUTPATIENT)
Dept: OBGYN CLINIC | Facility: MEDICAL CENTER | Age: 34
End: 2024-12-22

## 2024-12-26 ENCOUNTER — ROUTINE PRENATAL (OUTPATIENT)
Age: 34
End: 2024-12-26
Payer: COMMERCIAL

## 2024-12-26 VITALS
DIASTOLIC BLOOD PRESSURE: 70 MMHG | WEIGHT: 169.4 LBS | HEIGHT: 65 IN | SYSTOLIC BLOOD PRESSURE: 110 MMHG | HEART RATE: 94 BPM | BODY MASS INDEX: 28.22 KG/M2

## 2024-12-26 DIAGNOSIS — Z3A.20 20 WEEKS GESTATION OF PREGNANCY: Primary | ICD-10-CM

## 2024-12-26 DIAGNOSIS — Z36.89 ENCOUNTER FOR FETAL ANATOMIC SURVEY: ICD-10-CM

## 2024-12-26 DIAGNOSIS — Z36.86 ENCOUNTER FOR ANTENATAL SCREENING FOR CERVICAL LENGTH: ICD-10-CM

## 2024-12-26 PROCEDURE — 99213 OFFICE O/P EST LOW 20 MIN: CPT | Performed by: OBSTETRICS & GYNECOLOGY

## 2024-12-26 PROCEDURE — 76805 OB US >/= 14 WKS SNGL FETUS: CPT | Performed by: OBSTETRICS & GYNECOLOGY

## 2024-12-26 PROCEDURE — 76817 TRANSVAGINAL US OBSTETRIC: CPT | Performed by: OBSTETRICS & GYNECOLOGY

## 2024-12-26 NOTE — LETTER
"   Date: 2024    DIANNA Titus  [address]  [address]  [place] [address]    Patient: Wander Barker   YOB: 1990   Date of Visit: 2024   Gestational age 20w4d   Nature of this communication: Routine       This patient was seen recently in our  office.  Please see ultrasound report under \"OB Procedures\" tab.  Please don't hesitate to contact our office with any concerns or questions.      Sincerely,      Gerri Dockery MD  Attending Physician, Maternal-Fetal Medicine  [facility]    "

## 2024-12-26 NOTE — PROGRESS NOTES
"[facility]: Wander Barker was seen today at 20w4d for anatomic survey and cervical length screening ultrasound.  See ultrasound report under \"OB Procedures\" tab.  Please don't hesitate to contact our office with any concerns or questions.  -Gerri Dockery MD      "

## 2024-12-26 NOTE — PROGRESS NOTES
Ultrasound Probe Disinfection    A transvaginal ultrasound was performed.   Prior to use, disinfection was performed with High Level Disinfection Process ([employer]).  Probe serial number S3: [id number] was used.    Chaperone: Wilberto Carlin RDMS

## 2025-01-05 NOTE — ASSESSMENT & PLAN NOTE
Continue PNV daily   Follow up with PNC 3/14/25  Common discomforts of pregnancy and precautions including PTL reviewed. Signs and symptoms to report reviewed

## 2025-01-05 NOTE — PROGRESS NOTES
Name: Wander Barker      : 1990      MRN: [id number]  Encounter Provider: DIANNA Titus  Encounter Date: 2025   Encounter department: [facility]  :  Assessment & Plan  History of  delivery, antepartum  Planning repeat at term scheduled on 25 with [facility]         History of gestational hypertension  Baseline labs WNL  Continue LDASA daily        22 weeks gestation of pregnancy  Continue PNV daily   Follow up with PNC 3/14/25  Common discomforts of pregnancy and precautions including PTL reviewed. Signs and symptoms to report reviewed          RTO 4 weeks     History of Present Illness   HPI  Wander Barker is a 34 y.o. female who presents for PN visit      Denies loss of fluid, vaginal bleeding and abdominal pain.  Confirms frequent fetal movement.  Tolerating prenatal vitamin and valtrex well. AFP resulted screen negative reviewed with patient.     History obtained from: patient    Review of Systems   Constitutional:  Negative for chills and fever.   Respiratory: Negative.     Cardiovascular: Negative.      Medical History Reviewed by provider this encounter:  Allergies  Meds     .     Objective   LMP 2024 (Exact Date)      Physical Exam  Constitutional:       Appearance: Normal appearance.   Neurological:      Mental Status: She is alert and oriented to person, place, and time.   Psychiatric:         Mood and Affect: Mood normal.         Behavior: Behavior normal.

## 2025-01-07 ENCOUNTER — ROUTINE PRENATAL (OUTPATIENT)
Dept: OBGYN CLINIC | Facility: CLINIC | Age: 35
End: 2025-01-07

## 2025-01-07 VITALS — SYSTOLIC BLOOD PRESSURE: 108 MMHG | DIASTOLIC BLOOD PRESSURE: 72 MMHG | BODY MASS INDEX: 28.39 KG/M2 | WEIGHT: 170.6 LBS

## 2025-01-07 DIAGNOSIS — Z87.59 HISTORY OF GESTATIONAL HYPERTENSION: ICD-10-CM

## 2025-01-07 DIAGNOSIS — O34.219 HISTORY OF CESAREAN DELIVERY, ANTEPARTUM: Primary | ICD-10-CM

## 2025-01-07 DIAGNOSIS — Z3A.22 22 WEEKS GESTATION OF PREGNANCY: ICD-10-CM

## 2025-01-07 PROCEDURE — PNV: Performed by: NURSE PRACTITIONER

## 2025-01-14 ENCOUNTER — TELEPHONE (OUTPATIENT)
Age: 35
End: 2025-01-14

## 2025-01-14 NOTE — TELEPHONE ENCOUNTER
Spoke with patient who [id number], . She reports going to UC last night for UTI symptoms.  She advised they did put her on keflex for RBC's in her urine.  She wants to make sure that's safe to take in pregnancy. Pt was advised it is safe to take.  Pt verbalized understanding, no further questions or concerns at this time.

## 2025-01-21 ENCOUNTER — TELEMEDICINE (OUTPATIENT)
Dept: BEHAVIORAL/MENTAL HEALTH CLINIC | Facility: CLINIC | Age: 35
End: 2025-01-21
Payer: COMMERCIAL

## 2025-01-21 DIAGNOSIS — F43.22 ADJUSTMENT DISORDER WITH ANXIETY: Primary | ICD-10-CM

## 2025-01-21 PROCEDURE — 90791 PSYCH DIAGNOSTIC EVALUATION: CPT | Performed by: COUNSELOR

## 2025-01-21 NOTE — PSYCH
Virtual AWV Consent    Verification of patient location: confirmed at home     Patient is located at Home in the following state in which I hold an active license [place]    The patient was identified by name and date of birth. Wander Barker was informed that this is a telemedicine visit and that the visit is being conducted through the Epic Embedded platform. She agrees to proceed..  My office door was closed. No one else was in the room.  She acknowledged consent and understanding of privacy and security of the video platform. The patient has agreed to participate and understands they can discontinue the visit at any time.    Patient is aware this is a billable service.     Reason for visit is therapy follow up     Encounter provider Clemente De Los Santos LPC    Provider located at DIALYSIS CENTER  [facility]  [address]  [place] [address]      Recent Visits  No visits were found meeting these conditions.  Showing recent visits within past 7 days and meeting all other requirements  Today's Visits  Date Type Provider Dept   01/21/25 Telemedicine Clemente Manley LPC [facility]   Showing today's visits and meeting all other requirements  Future Appointments  No visits were found meeting these conditions.  Showing future appointments within next 150 days and meeting all other requirements           Visit Time  Total Visit Duration: 55 minutes      Behavioral Health Psychotherapy Assessment    Date of Initial Psychotherapy Assessment: 01/21/25  Referral Source: Self; previous client   Has a release of information been signed for the referral source? No    Preferred Name: Wander Barker  Preferred Pronouns: She/her  YOB: 1990 Age: 34 y.o.  Sex assigned at birth: female   Gender Identity: Female  Race:   Preferred Language: English    Emergency Contact:  Full Name: Sergio Barker  Relationship to Client:    Contact information: in cell  phone    Primary Care Physician:  DIANNA Bell  [address] [address] [phone]  Has a release of information been signed? No    Physical Health History:  Past surgical procedures: See chart  Do you have a history of any of the following: none   Do you have any mobility issues? No    Relevant Family History:  None noted; some mild anxiety    Presenting Problem (What brings you in?)  Struggling with anxiety related to the adjustment to two children.     Mental Health Advance Directive:  Do you currently have a Mental Health Advance Directive?no    Diagnosis:   Diagnosis ICD-10-CM Associated Orders   1. Adjustment disorder with anxiety  F43.22           Initial Assessment:     Current Mental Status:    Appearance: appropriate and neat      Behavior/Manner: cooperative      Affect/Mood:  Euthymic    Speech:  Normal    Sleep:  Normal    Oriented to: oriented to self, oriented to place and oriented to time       Clinical Symptoms    Anxiety: yes      Anxiety Symptoms: excessive worry      Have you ever been assaultive to others or the environment: No      Have you ever been self-injurious: No      Counseling History:  Previous Counseling or Treatment  (Mental Health or Drug & Alcohol): No    Have you previously taken psychiatric medications: No      Suicide Risk Assessment  Have you ever had a suicide attempt: No    Have you had incidents of suicidal ideation: No    Are you currently experiencing suicidal thoughts: No      Substance Abuse/Addiction Assessment:  Alcohol: No    Heroin: No    Fentanyl: No    Opiates: No    Cocaine: No    Amphetamines: No    Hallucinogens: No    Club Drugs: No    Benzodiazepines: No    Other Rx Meds: No    Marijuana: No    Tobacco/Nicotine: No    Have you experienced blackouts as a result of substance use: No    Have you had any periods of abstinence: No    Have you experienced symptoms of withdrawal: No    Have you ever overdosed on any substances?: No    Are  you currently using any Medication Assisted Treatment for Substance Use: No      Compulsive Behaviors:  Compulsive Behavior Information:  Denies    Social Determinants of Health:    SDOH:  Stress    Trauma and Abuse History:    Have you ever been abused: No      Legal History:    Have you ever been arrested  or had a DUI: No      Have you been incarcerated: No      Are you currently on parole/probation: No      Any current Children and Youth involvement: No      Any pending legal charges: No      Relationship History:    Current marital status:       Natural Supports:  Mother, father, siblings and friends    Employment History    Are you currently employed: Yes      Longest period of employment:  [employer]    Employer/ Job title:      Sources of income/financial support:  Work     History:      Status: no history of  duty  Educational History:     Have you ever been diagnosed with a learning disability: No      Highest level of education:  Master's Degree    Have you ever had an IEP or 504-plan: No      Do you need assistance with reading or writing: No      Recommended Treatment:     Psychotherapy:  Individual sessions    Frequency:  1 time    Session frequency:  Monthly      Visit start and stop times:    01/21/25  Start Time: 0805  Stop Time: 0900  Total Visit Time: 55 minutes

## 2025-01-26 ENCOUNTER — NURSE TRIAGE (OUTPATIENT)
Dept: OTHER | Facility: OTHER | Age: 35
End: 2025-01-26

## 2025-01-26 NOTE — TELEPHONE ENCOUNTER
Discussed with on call provider Dr. Arnold who advised patient is likely slightly dehydrated from gi bug on Friday. Recommended patient to increase fluid intake, add electrolyte drink, and monitor for new/worsening symptoms which at that point patient should present to ED. Patient made aware, expressed understanding and is agreeable. No further questions at this time.

## 2025-01-26 NOTE — TELEPHONE ENCOUNTER
"Reason for Disposition   Palpitations    Answer Assessment - Initial Assessment Questions  1. DESCRIPTION: \"Please describe your heart rate or heartbeat that you are having\" (e.g., fast/slow, regular/irregular, skipped or extra beats, \"palpitations\")        On/off increased HR, l    117 at rest this AM     2. ONSET: \"When did it start?\" (e.g., minutes, hours, days)         This morning     3. DURATION: \"How long does it last\" (e.g., seconds, minutes, hours)        117 lasted about 30seconds-1 minute at rest     4. PATTERN \"Does it come and go, or has it been constant since it started?\"  \"Does it get worse with exertion?\"   \"Are you feeling it now?\"        Comes and goes, worse with activity     5. TAP: \"Using your hand, can you tap out what you are feeling on a chair or table in front of you, so that I can hear?\" Note: Not all patients can do this.          Felt like heart was beating really fast    6. HEART RATE: \"Can you tell me your heart rate?\" \"How many beats in 15 seconds?\"  Note: Not all patients can do this.          86 now was 117 earlier today     7. RECURRENT SYMPTOM: \"Have you ever had this before?\" If Yes, ask: \"When was the last time?\" and \"What happened that time?\"         First pregnancy had vaso vagal responses x 4 had 1 this pregnancy so far     8. CAUSE: \"What do you think is causing the palpitations?\"        Had gi bug on Friday might be dehydrated     9. CARDIAC HISTORY: \"Do you have any history of heart disease?\" (e.g., heart attack, angina, bypass surgery, angioplasty, arrhythmia)         Had high blood pressure with previous pregnancy     10. OTHER SYMPTOMS: \"Do you have any other symptoms?\" (e.g., dizziness, chest pain, sweating, difficulty breathing)          Lightheaded mainly when moving and walking, headache, palpitations, edie like couldn't get comfortable     11. PREGNANCY: \"Is there any chance you are pregnant?\" \"When was your last menstrual period?\"          25w0d      Had gi bug on " Friday had vomiting/diarrhea for about 12 hours patient may be dehydrated, states she has been eating back to normal and drinking enough.    Protocols used: Heart Rate and Heartbeat Questions-Adult-AH

## 2025-01-28 ENCOUNTER — ROUTINE PRENATAL (OUTPATIENT)
Dept: OBGYN CLINIC | Facility: MEDICAL CENTER | Age: 35
End: 2025-01-28

## 2025-01-28 VITALS — WEIGHT: 172.2 LBS | SYSTOLIC BLOOD PRESSURE: 98 MMHG | DIASTOLIC BLOOD PRESSURE: 62 MMHG | BODY MASS INDEX: 28.66 KG/M2

## 2025-01-28 DIAGNOSIS — Z87.59 HISTORY OF GESTATIONAL HYPERTENSION: ICD-10-CM

## 2025-01-28 DIAGNOSIS — R00.2 PALPITATIONS: ICD-10-CM

## 2025-01-28 DIAGNOSIS — Z34.92 SECOND TRIMESTER PREGNANCY: Primary | ICD-10-CM

## 2025-01-28 DIAGNOSIS — O34.219 HISTORY OF CESAREAN DELIVERY, ANTEPARTUM: ICD-10-CM

## 2025-01-28 DIAGNOSIS — Z3A.25 25 WEEKS GESTATION OF PREGNANCY: ICD-10-CM

## 2025-01-28 PROCEDURE — PNV: Performed by: OBSTETRICS & GYNECOLOGY

## 2025-01-28 NOTE — PROGRESS NOTES
Assessment  34 y.o.  at 25w2d presenting for routine prenatal visit.     Plan  Diagnoses and all orders for this visit:    Second trimester pregnancy  25 weeks gestation of pregnancy  -     PTL precautions  - Normal movements  -     RPR-Syphilis Screening (Total Syphilis IGG/IGM); Future  -     Glucose, 1H PG; Future  -     Ferritin; Future  -     CBC and differential; Future  -     Return in 4wk for PN    Palpitations  -     Basic metabolic panel; Future  -     TSH, 3rd generation with Free T4 reflex; Future  -     Hx similar in prior preg with benign echo/holter    History of  delivery, antepartum  - RLTCS scheduled    History of gestational hypertension  - ASA to 36wk  - Routine BP and symptom monitoring    ____________________________________________________________        Subjective    Wander Lucero is a 34 y.o.  at 25w2d who presents for routine prenatal visit. She is noting tachycardia with minimal stimulus. Per self montioring, 117-124bpm. Feeling heart racing, mild palpitations. Called in and told to hydrate. Urine became more clear in color. Symptomatically improved somewhat, but continues to have episodic spikes in HR by her monitoring.without  She denies contractions, loss of fluid, or vaginal bleeding. She feels regular fetal movements.        Pregnancy Problems:  Patient Active Problem List   Diagnosis    Exercise-induced asthma    Herpes simplex type 1 infection    History of  delivery, antepartum    Adjustment disorder with anxiety    History of gestational hypertension    20 weeks gestation of pregnancy    COVID-19 affecting pregnancy in first trimester         Objective  BP 98/62   Wt 78.1 kg (172 lb 3.2 oz)   LMP 2024 (Exact Date)   BMI 28.66 kg/m²     FHT: 130 BPM   Uterine Size: size equals dates     Physical Exam:  Physical Exam  Constitutional:       General: She is not in acute distress.     Appearance: Normal appearance. She is well-developed. She is  not ill-appearing, toxic-appearing or diaphoretic.   HENT:      Head: Normocephalic and atraumatic.   Eyes:      General: No scleral icterus.        Right eye: No discharge.         Left eye: No discharge.      Conjunctiva/sclera: Conjunctivae normal.   Pulmonary:      Effort: Pulmonary effort is normal. No accessory muscle usage or respiratory distress.   Abdominal:      General: There is distension (gravid).      Tenderness: There is no abdominal tenderness. There is no guarding or rebound.   Skin:     General: Skin is warm and dry.      Coloration: Skin is not jaundiced.      Findings: No bruising, erythema or rash.   Neurological:      Mental Status: She is alert.   Psychiatric:         Mood and Affect: Mood normal.         Behavior: Behavior normal.         Thought Content: Thought content normal.         Judgment: Judgment normal.

## 2025-02-01 PROBLEM — Z3A.25 25 WEEKS GESTATION OF PREGNANCY: Status: ACTIVE | Noted: 2024-10-30

## 2025-02-03 LAB
BASOPHILS # BLD AUTO: 28 CELLS/UL (ref 0–200)
BASOPHILS NFR BLD AUTO: 0.4 %
BUN SERPL-MCNC: 5 MG/DL (ref 7–25)
BUN/CREAT SERPL: 9 (CALC) (ref 6–22)
CALCIUM SERPL-MCNC: 8.1 MG/DL (ref 8.6–10.2)
CHLORIDE SERPL-SCNC: 106 MMOL/L (ref 98–110)
CO2 SERPL-SCNC: 22 MMOL/L (ref 20–32)
CREAT SERPL-MCNC: 0.54 MG/DL (ref 0.5–0.97)
EOSINOPHIL # BLD AUTO: 43 CELLS/UL (ref 15–500)
EOSINOPHIL NFR BLD AUTO: 0.6 %
ERYTHROCYTE [DISTWIDTH] IN BLOOD BY AUTOMATED COUNT: 11.7 % (ref 11–15)
FERRITIN SERPL-MCNC: 16 NG/ML (ref 16–154)
GFR/BSA.PRED SERPLBLD CYS-BASED-ARV: 125 ML/MIN/1.73M2
GLUCOSE 1H P 50 G GLC PO SERPL-MCNC: 80 MG/DL
GLUCOSE SERPL-MCNC: 84 MG/DL (ref 65–99)
HCT VFR BLD AUTO: 33.8 % (ref 35–45)
HGB BLD-MCNC: 11.6 G/DL (ref 11.7–15.5)
LYMPHOCYTES # BLD AUTO: 2080 CELLS/UL (ref 850–3900)
LYMPHOCYTES NFR BLD AUTO: 29.3 %
MCH RBC QN AUTO: 31.5 PG (ref 27–33)
MCHC RBC AUTO-ENTMCNC: 34.3 G/DL (ref 32–36)
MCV RBC AUTO: 91.8 FL (ref 80–100)
MONOCYTES # BLD AUTO: 341 CELLS/UL (ref 200–950)
MONOCYTES NFR BLD AUTO: 4.8 %
NEUTROPHILS # BLD AUTO: 4608 CELLS/UL (ref 1500–7800)
NEUTROPHILS NFR BLD AUTO: 64.9 %
PLATELET # BLD AUTO: 188 THOUSAND/UL (ref 140–400)
PMV BLD REES-ECKER: 11 FL (ref 7.5–12.5)
POTASSIUM SERPL-SCNC: 3.7 MMOL/L (ref 3.5–5.3)
RBC # BLD AUTO: 3.68 MILLION/UL (ref 3.8–5.1)
RPR SER QL: NORMAL
SODIUM SERPL-SCNC: 138 MMOL/L (ref 135–146)
TSH SERPL-ACNC: 1.33 MIU/L
WBC # BLD AUTO: 7.1 THOUSAND/UL (ref 3.8–10.8)

## 2025-02-04 ENCOUNTER — RESULTS FOLLOW-UP (OUTPATIENT)
Dept: OBGYN CLINIC | Facility: MEDICAL CENTER | Age: 35
End: 2025-02-04

## 2025-02-11 ENCOUNTER — TELEPHONE (OUTPATIENT)
Age: 35
End: 2025-02-11

## 2025-02-11 PROBLEM — Z3A.27 27 WEEKS GESTATION OF PREGNANCY: Status: ACTIVE | Noted: 2024-10-30

## 2025-02-11 PROBLEM — Z34.92 SECOND TRIMESTER PREGNANCY: Status: RESOLVED | Noted: 2025-01-28 | Resolved: 2025-02-11

## 2025-02-11 NOTE — TELEPHONE ENCOUNTER
Would like to reschedule appointment on 2/13/25, no availability. Patient okay with scheduling in [place] or [place] offices. If unable to reschedule, patient would like to cancel appointment and will be in on 2/25/25. Contact patient to assist.

## 2025-02-12 NOTE — ASSESSMENT & PLAN NOTE
Continue PNV daily   Fetal kick counts reviewed, encouraged daily and written information provided  28-week folder provided and reviewed. Tdap administered today.  Breast pump ordered today.  Follow up with [facility] 3/14/25  Common discomforts of pregnancy and precautions including PTL reviewed. Signs and symptoms to report reviewed   Orders:    Tdap Vaccine greater than or equal to 8yo    Ambulatory Referral to Cardiology; Future

## 2025-02-12 NOTE — ASSESSMENT & PLAN NOTE
Planning repeat at term scheduled on 5/6/25 with LEIAK    Patient is uncertain regarding female sterilization. aware is a permanent procedure with a high risk of regret.

## 2025-02-12 NOTE — PROGRESS NOTES
Name: Wander Barker      : 1990      MRN: [id number]  Encounter Provider: DIANNA Titus  Encounter Date: 2025   Encounter department: [facility]  :  Assessment & Plan  27 weeks gestation of pregnancy  Continue PNV daily   Fetal kick counts reviewed, encouraged daily and written information provided  28-week folder provided and reviewed. Tdap administered today.  Breast pump ordered today.  Follow up with [facility] 3/14/25  Common discomforts of pregnancy and precautions including PTL reviewed. Signs and symptoms to report reviewed   Orders:    Tdap Vaccine greater than or equal to 8yo    Ambulatory Referral to Cardiology; Future    History of  delivery, antepartum  Planning repeat at term scheduled on 25 with XUAN    Patient is uncertain regarding female sterilization. aware is a permanent procedure with a high risk of regret.       History of gestational hypertension  Baseline labs WNL  Continue LDASA daily        Encounter for immunization    Orders:    Tdap Vaccine greater than or equal to 8yo    Palpitations  Referral placed for cardiology     S/p holter      S/p ECHO       Testing performed in prior pregnancy   Signs and symptoms to report reviewed  Orders:    Ambulatory Referral to Cardiology; Future      RTO 2 weeks   History of Present Illness   HPI  Wander Barker is a 34 y.o. female who presents  for PN visit      Denies loss of fluid, vaginal bleeding and abdominal pain. Confirms frequent movement.  Tolerating PNV and valtrex well.  Patient is concerned with increasing palpitations.  Heart rate can get up to 150s.  Increase in heart rate is not always associated with movement or stress.  Had similar episode in last pregnancy was evaluated by cardiology Holter/echo-WNL .  Plans include repeat  section, breast-feeding[facility].  Uncertain regarding circumcision and postpartum contraception.  Was  initially considering permanent female sterilization is uncertain at this time        Review of Systems   Constitutional:  Negative for chills and fever.   Respiratory: Negative.     Cardiovascular: Negative.      Medical History Reviewed by provider this encounter:  Allergies  Meds  Problems     .     Objective   /72   Wt 79 kg (174 lb 1.6 oz)   LMP 08/04/2024 (Exact Date)   BMI 28.97 kg/m²      Physical Exam  Constitutional:       Appearance: Normal appearance.   Neurological:      Mental Status: She is alert and oriented to person, place, and time.   Psychiatric:         Mood and Affect: Mood normal.         Behavior: Behavior normal.

## 2025-02-13 ENCOUNTER — ROUTINE PRENATAL (OUTPATIENT)
Dept: OBGYN CLINIC | Facility: MEDICAL CENTER | Age: 35
End: 2025-02-13
Payer: COMMERCIAL

## 2025-02-13 VITALS — WEIGHT: 174.1 LBS | DIASTOLIC BLOOD PRESSURE: 72 MMHG | BODY MASS INDEX: 28.97 KG/M2 | SYSTOLIC BLOOD PRESSURE: 122 MMHG

## 2025-02-13 DIAGNOSIS — Z87.59 HISTORY OF GESTATIONAL HYPERTENSION: ICD-10-CM

## 2025-02-13 DIAGNOSIS — O34.219 HISTORY OF CESAREAN DELIVERY, ANTEPARTUM: Primary | ICD-10-CM

## 2025-02-13 DIAGNOSIS — Z3A.27 27 WEEKS GESTATION OF PREGNANCY: ICD-10-CM

## 2025-02-13 DIAGNOSIS — R00.2 PALPITATIONS: ICD-10-CM

## 2025-02-13 DIAGNOSIS — Z23 ENCOUNTER FOR IMMUNIZATION: ICD-10-CM

## 2025-02-13 PROCEDURE — 90715 TDAP VACCINE 7 YRS/> IM: CPT | Performed by: NURSE PRACTITIONER

## 2025-02-13 PROCEDURE — PNV: Performed by: NURSE PRACTITIONER

## 2025-02-13 PROCEDURE — 90471 IMMUNIZATION ADMIN: CPT | Performed by: NURSE PRACTITIONER

## 2025-02-13 RX ORDER — CEPHALEXIN 500 MG/1
1 CAPSULE ORAL 2 TIMES DAILY
COMMUNITY
Start: 2025-01-13 | End: 2025-02-13

## 2025-02-13 NOTE — PROGRESS NOTES
Cardiology Follow Up    Wander Barker  1990  [id number]  [facility]  [address] [facility]  [place] [id number] [phone]    Assessment & Plan  Palpitations  1 month of daily palpitations occurring between 7 AM and 9 AM occasionally associated with dizziness.  Apple Watch shows occasional high heart rates with palpitations.    48-hour Holter monitor evaluate heart rate rhythm rule out burden of ectopy   TSH and mag level to be done in the near future.   Continue to hydrate.    Stop Unisom          Interval History:   Ms Husam Barker called the office on 25 with complaints of palpitations.  She was scheduled to be seen in our office.   Husam is 28 weeks pregnant. Her OB GYN wanted her to be seen     Wander presents to our office with a one month hx of Palpitations.  According to Wander experienced palpitations with her last pregnancy which resolved on their own.  She admits to palpitations occurring between 7 AM and 9 AM while driving to work.  Wander is wearing an Apple watch and has noted High HR associated with palpitation.  Her watch does not have an EKG AP.  She admits to occasional dizziness with palpitations.   She is hydrating and eating a well-balanced diet including bananas.        Medical History   Primary Cardiologist Dr Mckoy  Hypercholesreolemia  Asthma  Vaso vagal syncope  LVEF 60% trace MR, trace TR    Patient Active Problem List   Diagnosis    Exercise-induced asthma    Herpes simplex type 1 infection    History of  delivery, antepartum    Adjustment disorder with anxiety    History of gestational hypertension    27 weeks gestation of pregnancy    COVID-19 affecting pregnancy in first trimester     Past Medical History:   Diagnosis Date    Anemia [id number]    Asthma     Hypercholesterolemia     Last Assessed: 2014    Varicella      Social History     Socioeconomic History    Marital status:  /Civil Union     Spouse name: Not on file    Number of children: Not on file    Years of education: Not on file    Highest education level: Not on file   Occupational History    Not on file   Tobacco Use    Smoking status: Never    Smokeless tobacco: Never   Vaping Use    Vaping status: Never Used   Substance and Sexual Activity    Alcohol use: Not Currently     Alcohol/week: 1.0 - 2.0 standard drink of alcohol     Types: 1 - 2 Standard drinks or equivalent per week     Comment: rare    Drug use: Never    Sexual activity: Yes     Partners: Male     Birth control/protection: None   Other Topics Concern    Not on file   Social History Narrative    Not on file     Social Drivers of Health     Financial Resource Strain: Low Risk  (11/6/2023)    [facility]    Overall Financial Resource Strain (CARDIA)     Difficulty of Paying Living Expenses: Not hard at all   Food Insecurity: No Food Insecurity (10/6/2024)    Nursing - Inadequate Food Risk Classification     Worried About Running Out of Food in the Last Year: Never true     Ran Out of Food in the Last Year: Never true     Ran Out of Food in the Last Year: Not on file   Transportation Needs: No Transportation Needs (10/6/2024)    PRAPARE - Transportation     Lack of Transportation (Medical): No     Lack of Transportation (Non-Medical): No   Physical Activity: Unknown (11/6/2023)    Received from [facility]    Exercise Vital Sign     Days of Exercise per Week: 0 days     Minutes of Exercise per Session: Patient refused   Stress: No Stress Concern Present (11/6/2023)    [facility]    [employer] - Occupational Stress Questionnaire     Feeling of Stress : Only a little   Social Connections: Moderately Isolated (11/6/2023)    [facility]     Social Connection and Isolation Panel [NHANES]     Frequency of Communication with Friends and Family: More than three times a week     Frequency of Social Gatherings with Friends and Family: More than three times a week     Attends Rastafari Services: Never     Active Member of Clubs or Organizations: No     Attends Club or Organization Meetings: Never     Marital Status:    Intimate Partner Violence: Not At Risk (11/6/2023)    [facility]    Humiliation, Afraid, Rape, and Kick questionnaire     Fear of Current or Ex-Partner: No     Emotionally Abused: No     Physically Abused: No     Sexually Abused: No   Housing Stability: Low Risk  (10/6/2024)    Housing Stability Vital Sign     Unable to Pay for Housing in the Last Year: No     Number of Times Moved in the Last Year: 0     Homeless in the Last Year: No      Family History   Problem Relation Age of Onset    Hypertension Mother     Hyperlipidemia Mother     Hyperlipidemia Father     Skin cancer Father     Cancer Father         skin    No Known Problems Sister     Lung cancer Maternal Grandmother     Heart block Maternal Grandmother     Cancer Maternal Grandmother         lung    Diabetes Maternal Grandfather     Hypertension Maternal Grandfather     Coronary artery disease Maternal Grandfather     Hyperlipidemia Maternal Grandfather     Lung cancer Paternal Grandmother     Breast cancer Paternal Grandmother     Throat cancer Paternal Grandmother     Alcohol abuse Paternal Grandmother     Cancer Paternal Grandmother         lung, throat    Colon cancer Paternal Grandfather     Asthma Paternal Grandfather     Cancer Paternal Grandfather         liver    Liver cancer Paternal Grandfather     No Known Problems Daughter     Ovarian cancer Neg Hx      Past Surgical History:   Procedure Laterality Date    ADENOIDECTOMY      Last Assessed: 04Mar2014    DENTAL SURGERY      Last Assessed: 04Mar2014     MYRINGOTOMY      Last Assessed: 2014    HI  DELIVERY ONLY N/A 2023    Procedure:  SECTION ();  Surgeon: Monet Obrien MD;  Location: [facility];  Service: Obstetrics       Current Outpatient Medications:     acetaminophen (TYLENOL) 500 mg tablet, Take 500 mg by mouth every 6 (six) hours as needed for mild pain, Disp: , Rfl:     Doxylamine Succinate, Sleep, (UNISOM PO), Take by mouth, Disp: , Rfl:     loratadine (CLARITIN) 10 mg tablet, Take 10 mg by mouth daily, Disp: , Rfl:     valACYclovir (VALTREX) 500 mg tablet, Take 1 tablet (500 mg total) by mouth 2 (two) times a day, Disp: 90 tablet, Rfl: 3  Allergies   Allergen Reactions    Seasonal Ic  [Cholestatin]        Labs:  Routine Prenatal on 2025   Component Date Value    Supplier Name 2025 [employer] by [employer]     Supplier Phone Number 2025 [phone]     Order Status 2025 Confirmation In Progress     Delivery Request Date 2025     Item Description 2025 Breast Pump    Orders Only on 2025   Component Date Value    Glucose 2025 80     Glucose, Random 2025 84     BUN 2025 5 (L)     Creatinine 2025 0.54     eGFR 2025 125     SL AMB BUN/CREATININE RA* 2025 9     Sodium 2025 138     Potassium 2025 3.7     Chloride 2025 106     CO2 2025 22     Calcium 2025 8.1 (L)     White Blood Cell Count 2025 7.1     Red Blood Cell Count 2025 3.68 (L)     Hemoglobin 2025 11.6 (L)     HCT 2025 33.8 (L)     MCV 2025 91.8     MCH 2025 31.5     MCHC 2025 34.3     RDW 2025 11.7     Platelet Count 2025 188     SL AMB MPV 2025 11.0     Neutrophils (Absolute) 2025 4,608     Lymphocytes (Absolute) 2025 2,080     Monocytes (Absolute) 2025 341     Eosinophils (Absolute) 2025 43     Basophils ABS 2025 28     Neutrophils 2025 64.9     Lymphocytes  02/01/2025 29.3     Monocytes 02/01/2025 4.8     Eosinophils 02/01/2025 0.6     Basophils PCT 02/01/2025 0.4     Ferritin 02/01/2025 16     TSH W/RFX TO FREE T4 02/01/2025 1.33     RPR 02/01/2025 NON-REACTIVE    Orders Only on 12/19/2024   Component Date Value    MSAFP Interp 12/19/2024 Screen negative for open NTD.     Risk for ONTD 12/19/2024 1 IN 2715     AFP 12/19/2024 57.3     MSAFP Mom 12/19/2024 1.10     Comment(s) 12/19/2024      Comment(s) 12/19/2024      Calculated Gestational A* 12/19/2024 19.6     Maternal Weight 12/19/2024 163     Estimated Delivery Date 12/19/2024 05/11/2025     Gestat. Age Based On 12/19/2024 LMP     Race 12/19/2024      Number of Fetuses 12/19/2024 1     Insulin Dep. Diabetic 12/19/2024 NO     Repeat Specimen 12/19/2024 NO     Hx Of Neural Tube Defects 12/19/2024 NO     Prev Pregnancy Down Synd 12/19/2024 NO     Donor Egg 12/19/2024 NO     Donor Age: Egg Retrieval 12/19/2024 NOT GIVEN      Imaging: No results found.    Review of Systems:  Review of Systems   Cardiovascular:  Positive for palpitations.       Physical Exam:  Physical Exam  Vitals reviewed.   Constitutional:       Appearance: Normal appearance.      Comments: 28 weeks pregnant    HENT:      Head: Normocephalic.   Cardiovascular:      Rate and Rhythm: Normal rate and regular rhythm.      Pulses: Normal pulses.      Heart sounds: Normal heart sounds.   Pulmonary:      Effort: Pulmonary effort is normal.      Breath sounds: Normal breath sounds.   Musculoskeletal:      Cervical back: Normal range of motion.      Right lower leg: No edema.      Left lower leg: No edema.   Skin:     General: Skin is warm and dry.      Capillary Refill: Capillary refill takes less than 2 seconds.   Neurological:      General: No focal deficit present.      Mental Status: She is alert and oriented to person, place, and time.   Psychiatric:         Mood and Affect: Mood normal.         Behavior: Behavior normal.

## 2025-02-17 ENCOUNTER — OFFICE VISIT (OUTPATIENT)
Dept: CARDIOLOGY CLINIC | Facility: CLINIC | Age: 35
End: 2025-02-17
Payer: COMMERCIAL

## 2025-02-17 ENCOUNTER — PROCEDURE VISIT (OUTPATIENT)
Dept: CARDIOLOGY CLINIC | Facility: CLINIC | Age: 35
End: 2025-02-17
Payer: COMMERCIAL

## 2025-02-17 VITALS
SYSTOLIC BLOOD PRESSURE: 112 MMHG | OXYGEN SATURATION: 98 % | HEIGHT: 65 IN | WEIGHT: 175 LBS | DIASTOLIC BLOOD PRESSURE: 72 MMHG | HEART RATE: 84 BPM | BODY MASS INDEX: 29.16 KG/M2

## 2025-02-17 DIAGNOSIS — R00.2 PALPITATIONS: Primary | ICD-10-CM

## 2025-02-17 PROCEDURE — RECHECK: Performed by: NURSE PRACTITIONER

## 2025-02-17 PROCEDURE — 99214 OFFICE O/P EST MOD 30 MIN: CPT | Performed by: NURSE PRACTITIONER

## 2025-02-24 PROBLEM — Z3A.29 29 WEEKS GESTATION OF PREGNANCY: Status: ACTIVE | Noted: 2024-10-30

## 2025-02-24 NOTE — ASSESSMENT & PLAN NOTE
Continue PNV daily   Continue fetal kick counts daily  Follow up with PNC 3/14/25  Common discomforts of pregnancy and precautions including PTL reviewed. Signs and symptoms to report reviewed

## 2025-02-24 NOTE — PROGRESS NOTES
Name: Wander Barker      : 1990      MRN: [id number]  Encounter Provider: DIANNA Titus  Encounter Date: 2025   Encounter department: [facility]  :  Assessment & Plan  29 weeks gestation of pregnancy  Continue PNV daily   Continue fetal kick counts daily  Follow up with PNC 3/14/25  Common discomforts of pregnancy and precautions including PTL reviewed. Signs and symptoms to report reviewed          History of  delivery, antepartum  Planning repeat at term scheduled on 25 with WCK             History of gestational hypertension  Baseline labs WNL  Continue LDASA daily          RTO 2 weeks f/u holter     History of Present Illness   HPI  Wander Barker is a 34 y.o. female who presents for PN visit     Denies loss of fluid, vaginal bleeding and abdominal pain.  Confirms frequent fetal movement.  Doing fetal kick counts.  Tolerating prenatal vitamin, valtrex  and low-dose aspirin. Uses unisom as needed. Completed Holter monitor awaiting results.     History obtained from: patient    Review of Systems   Constitutional:  Negative for chills and fever.   Respiratory: Negative.     Cardiovascular: Negative.    Medical History Reviewed by provider this encounter:  Allergies  Meds  Problems     .     Objective   LMP 2024 (Exact Date)      Physical Exam  Constitutional:       Appearance: Normal appearance.   Neurological:      Mental Status: She is alert and oriented to person, place, and time.   Psychiatric:         Mood and Affect: Mood normal.         Behavior: Behavior normal.

## 2025-02-25 ENCOUNTER — ROUTINE PRENATAL (OUTPATIENT)
Dept: OBGYN CLINIC | Facility: CLINIC | Age: 35
End: 2025-02-25

## 2025-02-25 ENCOUNTER — TELEPHONE (OUTPATIENT)
Dept: OBGYN CLINIC | Facility: CLINIC | Age: 35
End: 2025-02-25

## 2025-02-25 VITALS — WEIGHT: 175.6 LBS | BODY MASS INDEX: 29.22 KG/M2 | DIASTOLIC BLOOD PRESSURE: 82 MMHG | SYSTOLIC BLOOD PRESSURE: 112 MMHG

## 2025-02-25 DIAGNOSIS — O34.219 HISTORY OF CESAREAN DELIVERY, ANTEPARTUM: Primary | ICD-10-CM

## 2025-02-25 DIAGNOSIS — Z87.59 HISTORY OF GESTATIONAL HYPERTENSION: ICD-10-CM

## 2025-02-25 DIAGNOSIS — Z3A.29 29 WEEKS GESTATION OF PREGNANCY: ICD-10-CM

## 2025-02-25 PROCEDURE — PNV: Performed by: NURSE PRACTITIONER

## 2025-02-26 ENCOUNTER — RESULTS FOLLOW-UP (OUTPATIENT)
Dept: CARDIOLOGY CLINIC | Facility: CLINIC | Age: 35
End: 2025-02-26

## 2025-02-26 ENCOUNTER — HOSPITAL ENCOUNTER (OUTPATIENT)
Dept: NON INVASIVE DIAGNOSTICS | Facility: HOSPITAL | Age: 35
Discharge: HOME | End: 2025-02-26
Payer: COMMERCIAL

## 2025-02-26 ENCOUNTER — TELEPHONE (OUTPATIENT)
Dept: ADMINISTRATIVE | Facility: HOSPITAL | Age: 35
End: 2025-02-26

## 2025-02-26 DIAGNOSIS — R00.2 PALPITATIONS: ICD-10-CM

## 2025-02-26 PROCEDURE — 93226 XTRNL ECG REC<48 HR SCAN A/R: CPT

## 2025-02-26 PROCEDURE — 93227 XTRNL ECG REC<48 HR R&I: CPT | Performed by: INTERNAL MEDICINE

## 2025-02-26 PROCEDURE — 93225 XTRNL ECG REC<48 HRS REC: CPT

## 2025-03-07 ENCOUNTER — TELEPHONE (OUTPATIENT)
Age: 35
End: 2025-03-07

## 2025-03-07 NOTE — TELEPHONE ENCOUNTER
"Patient submitted online request via [facility] [place] landing page (web) with following message:   \"I really wanted to tour the [facility] at the [place] location. I saw the earliest opening is May 5, but I am scheduled May 6th for my . Is there a waitlist or any other days/times available?\"    Called pt, provided ph# for Mother/ baby Unit Tour. Pt had no further questions.   "

## 2025-03-11 PROBLEM — Z3A.31 31 WEEKS GESTATION OF PREGNANCY: Status: ACTIVE | Noted: 2024-10-30

## 2025-03-11 NOTE — ASSESSMENT & PLAN NOTE
Continue PNV daily   Continue fetal kick counts daily  Follow up with PNC 3/14/25  Palpitations - s/p Holter monitor rare PACs/ PVCs. No tx or f/u recommended   Common discomforts of pregnancy and precautions including PTL reviewed. Signs and symptoms to report reviewed

## 2025-03-11 NOTE — ASSESSMENT & PLAN NOTE
Planning repeat with permanent female sterilization at term scheduled on 5/6/25 with XUAN

## 2025-03-11 NOTE — PROGRESS NOTES
Name: Wander Barker      : 1990      MRN: [id number]  Encounter Provider: DIANNA Titus  Encounter Date: 3/14/2025   Encounter department: [facility]  :  Assessment & Plan  31 weeks gestation of pregnancy  Continue PNV daily   Continue fetal kick counts daily  Follow up with PNC 3/14/25  Palpitations - s/p Holter monitor rare PACs/ PVCs. No tx or f/u recommended   Common discomforts of pregnancy and precautions including PTL reviewed. Signs and symptoms to report reviewed            History of  delivery, antepartum  Planning repeat with permanent female sterilization at term scheduled on 25 with WCK               History of gestational hypertension  Baseline labs WNL  Continue LDASA daily            Herpes simplex type 1 infection  Continue Valtrex daily  Increased to twice daily at 36 gestational weeks           RTO 2 weeks   History of Present Illness   HPI  Wander Barker is a 34 y.o. female who presents for PN visit     Denies loss of fluid, vaginal bleeding and abdominal pain.  Confirms frequent fetal movement.  Doing fetal kick counts.  Tolerating prenatal vitamin, Valtrex and Claritin well.  Received results of Holter monitor results with rare PACs/PVCs.      History obtained from: patient    Review of Systems   Constitutional:  Negative for chills and fever.   Respiratory: Negative.     Cardiovascular: Negative.    Genitourinary: Negative.      Medical History Reviewed by provider this encounter:  Allergies  Meds     .     Objective   LMP 2024 (Exact Date)      Physical Exam  Constitutional:       Appearance: Normal appearance.   Neurological:      Mental Status: She is alert and oriented to person, place, and time.   Psychiatric:         Mood and Affect: Mood normal.         Behavior: Behavior normal.

## 2025-03-14 ENCOUNTER — ULTRASOUND (OUTPATIENT)
Dept: PERINATAL CARE | Facility: OTHER | Age: 35
End: 2025-03-14
Payer: COMMERCIAL

## 2025-03-14 ENCOUNTER — ROUTINE PRENATAL (OUTPATIENT)
Dept: OBGYN CLINIC | Facility: MEDICAL CENTER | Age: 35
End: 2025-03-14

## 2025-03-14 VITALS
HEART RATE: 88 BPM | DIASTOLIC BLOOD PRESSURE: 66 MMHG | SYSTOLIC BLOOD PRESSURE: 124 MMHG | HEIGHT: 65 IN | WEIGHT: 174.6 LBS | BODY MASS INDEX: 29.09 KG/M2

## 2025-03-14 VITALS — WEIGHT: 175 LBS | SYSTOLIC BLOOD PRESSURE: 100 MMHG | BODY MASS INDEX: 29.12 KG/M2 | DIASTOLIC BLOOD PRESSURE: 72 MMHG

## 2025-03-14 DIAGNOSIS — O34.219 HISTORY OF CESAREAN DELIVERY, ANTEPARTUM: Primary | ICD-10-CM

## 2025-03-14 DIAGNOSIS — Z87.59 HISTORY OF GESTATIONAL HYPERTENSION: ICD-10-CM

## 2025-03-14 DIAGNOSIS — O98.511 COVID-19 AFFECTING PREGNANCY IN FIRST TRIMESTER: ICD-10-CM

## 2025-03-14 DIAGNOSIS — Z3A.31 31 WEEKS GESTATION OF PREGNANCY: ICD-10-CM

## 2025-03-14 DIAGNOSIS — Z36.89 ENCOUNTER FOR ULTRASOUND TO ASSESS FETAL GROWTH: ICD-10-CM

## 2025-03-14 DIAGNOSIS — U07.1 COVID-19 AFFECTING PREGNANCY IN FIRST TRIMESTER: ICD-10-CM

## 2025-03-14 DIAGNOSIS — O36.63X0 MACROSOMIA OF FETUS AFFECTING MANAGEMENT OF MOTHER IN THIRD TRIMESTER, SINGLE OR UNSPECIFIED FETUS: ICD-10-CM

## 2025-03-14 DIAGNOSIS — B00.9 HERPES SIMPLEX TYPE 1 INFECTION: ICD-10-CM

## 2025-03-14 PROCEDURE — 76816 OB US FOLLOW-UP PER FETUS: CPT | Performed by: OBSTETRICS & GYNECOLOGY

## 2025-03-14 PROCEDURE — PNV: Performed by: NURSE PRACTITIONER

## 2025-03-14 PROCEDURE — 99212 OFFICE O/P EST SF 10 MIN: CPT | Performed by: OBSTETRICS & GYNECOLOGY

## 2025-03-14 NOTE — PROGRESS NOTES
Wander Barker has no complaints today.  She reports regular fetal movements and does not report any problems.  She is here today at 31w5d for an ultrasound for fetal growth.    Problem list:  History of a COVID infection in the first trimester  History of prior  section for suspected macrosomia.  Her fetus ultimately measured 7 pounds 6 ounces at birth at 38w5d.  She is planning on a repeat .    Ultrasound findings:  The ultrasound today shows a similar growth pattern with this fetus as compared to her prior baby in that both the fetal head and abdomen are symmetric and both are measuring more than the 90th percentile. Amniotic fluid appears normal in amount.  Her placenta does not appear to be in the area of the prior  scar.    Pregnancy ultrasound has limitations and is unable to detect all forms of fetal congenital abnormalities.  The inaccuracy in the EFW can be off by 1 lb either way in the third trimester.    Follow up recommended:   Whether follow-up ultrasounds for growth are recommended at this time since she is planning a repeat .    Pre visit time reviewing her records   5 minutes  Face to face time 5 minutes  Post visit time on documentation of note, updating her problem list, adding orders and prescriptions 5 minutes.  Procedures that were completed today were charged separately.   The level of decision making was straight forward.    Berta Moreno MD

## 2025-03-14 NOTE — LETTER
2025     Karmen Sunshine MD  [address] [place] [address]  [address] [place] [address]    Patient: Wander Barker   YOB: 1990   Date of Visit: 3/14/2025       Dear Dr. Sunshine:    Thank you for referring Wander Barker to me for evaluation. Below are my notes for this consultation.    If you have questions, please do not hesitate to call me. I look forward to following your patient along with you.         Sincerely,        Berta Moreno MD        CC: No Recipients    Berta Moreno MD  3/14/2025  6:11 PM  Sign when Signing Visit  Wander Barker has no complaints today.  She reports regular fetal movements and does not report any problems.  She is here today at 31w5d for an ultrasound for fetal growth.    Problem list:  History of a COVID infection in the first trimester  History of prior  section for suspected macrosomia.  Her fetus ultimately measured 7 pounds 6 ounces at birth at 38w5d.  She is planning on a repeat .    Ultrasound findings:  The ultrasound today shows a similar growth pattern with this fetus as compared to her prior baby in that both the fetal head and abdomen are symmetric and both are measuring more than the 90th percentile. Amniotic fluid appears normal in amount.  Her placenta does not appear to be in the area of the prior  scar.    Pregnancy ultrasound has limitations and is unable to detect all forms of fetal congenital abnormalities.  The inaccuracy in the EFW can be off by 1 lb either way in the third trimester.    Follow up recommended:   Whether follow-up ultrasounds for growth are recommended at this time since she is planning a repeat .    Pre visit time reviewing her records   5 minutes  Face to face time 5 minutes  Post visit time on documentation of note, updating her problem list, adding orders and prescriptions 5 minutes.  Procedures that were completed today were charged separately.   The level of decision  making was straight forward.    Berta Moreno MD

## 2025-03-28 ENCOUNTER — ROUTINE PRENATAL (OUTPATIENT)
Age: 35
End: 2025-03-28

## 2025-03-28 VITALS — WEIGHT: 181 LBS | BODY MASS INDEX: 30.12 KG/M2 | DIASTOLIC BLOOD PRESSURE: 80 MMHG | SYSTOLIC BLOOD PRESSURE: 120 MMHG

## 2025-03-28 DIAGNOSIS — O34.219 HISTORY OF CESAREAN DELIVERY, ANTEPARTUM: ICD-10-CM

## 2025-03-28 DIAGNOSIS — Z3A.33 33 WEEKS GESTATION OF PREGNANCY: Primary | ICD-10-CM

## 2025-03-28 DIAGNOSIS — N89.8 VAGINAL DISCHARGE: ICD-10-CM

## 2025-03-28 DIAGNOSIS — Z87.59 HISTORY OF GESTATIONAL HYPERTENSION: ICD-10-CM

## 2025-03-28 PROCEDURE — 87660 TRICHOMONAS VAGIN DIR PROBE: CPT | Performed by: OBSTETRICS & GYNECOLOGY

## 2025-03-28 PROCEDURE — PNV: Performed by: OBSTETRICS & GYNECOLOGY

## 2025-03-28 PROCEDURE — 87480 CANDIDA DNA DIR PROBE: CPT | Performed by: OBSTETRICS & GYNECOLOGY

## 2025-03-28 PROCEDURE — 87510 GARDNER VAG DNA DIR PROBE: CPT | Performed by: OBSTETRICS & GYNECOLOGY

## 2025-03-28 NOTE — PROGRESS NOTES
Name: Wander Barker      : 1990      MRN: [id number]  Encounter Provider: Karmen Sunshine MD  Encounter Date: 3/28/2025   Encounter department: [facility]  :  Assessment & Plan  Vaginal discharge    Orders:    Vaginosis DNA Probe        History of Present Illness {?Quick Links Encounters * My Last Note * Last Note in Specialty * Snapshot * Since Last Visit * History :05714}  HPI  Wander Barker is a 34 y.o. female who presents ***  {History obtained from(Optional):51852}    Review of Systems  {Select to insert medical history sections (Optional):21463}     Objective {?Quick Links Trend Vitals * Enter New Vitals * Results Review * Timeline (Adult) * Labs * Imaging * Cardiology * Procedures * Lung Cancer Screening * Surgical eConsent :54926}  /80   Wt 82.1 kg (181 lb)   LMP 2024 (Exact Date)   BMI 30.12 kg/m²      Physical Exam    {Administrative / Billing Section (Optional):20924}

## 2025-03-28 NOTE — PROGRESS NOTES
Name: Wander Barker      : 1990      MRN: [id number]  Encounter Provider: Karmen Sunshine MD  Encounter Date: 3/28/2025   Encounter department: [facility]  :  Assessment & Plan  Vaginal discharge    Orders:    Vaginosis DNA Probe    33 weeks gestation of pregnancy       FKC And  PTL precautions reviewed    History of gestational hypertension       normotensive   No  signs of  symptoms of  preeclampsia   History of  delivery, antepartum       scheduled for  repeat  C/S        History of Present Illness   HPI  Wander Barker is a 34 y.o. female who presents for routine prenatal care  No lof or  VB  no contractions    +FM   Dotices slight brown discharge  in underwear today but states this week she has not really rested        Review of Systems   All other systems reviewed and are negative.       Objective   /80   Wt 82.1 kg (181 lb)   LMP 2024 (Exact Date)   BMI 30.12 kg/m²      Physical Exam  Vitals reviewed.   Constitutional:       Appearance: Normal appearance.   HENT:      Head: Normocephalic and atraumatic.      Nose: Nose normal.   Eyes:      Conjunctiva/sclera: Conjunctivae normal.   Pulmonary:      Effort: Pulmonary effort is normal.   Abdominal:      Comments: Gravid  non tender     Genitourinary:     General: Normal vulva.      Vagina: Normal.      Cervix: Normal.      Comments: Cervical exam  - closed / thick / -3    Neurological:      General: No focal deficit present.      Mental Status: She is alert and oriented to person, place, and time.   Psychiatric:         Mood and Affect: Mood normal.         Behavior: Behavior normal.

## 2025-04-01 ENCOUNTER — RESULTS FOLLOW-UP (OUTPATIENT)
Dept: OBGYN CLINIC | Facility: MEDICAL CENTER | Age: 35
End: 2025-04-01

## 2025-04-01 DIAGNOSIS — B96.89 BV (BACTERIAL VAGINOSIS): Primary | ICD-10-CM

## 2025-04-01 DIAGNOSIS — N76.0 BV (BACTERIAL VAGINOSIS): Primary | ICD-10-CM

## 2025-04-01 LAB
CANDIDA RRNA VAG QL PROBE: NOT DETECTED
G VAGINALIS RRNA GENITAL QL PROBE: DETECTED
T VAGINALIS RRNA GENITAL QL PROBE: NOT DETECTED

## 2025-04-01 RX ORDER — METRONIDAZOLE 500 MG/1
500 TABLET ORAL EVERY 12 HOURS SCHEDULED
Qty: 14 TABLET | Refills: 0 | Status: SHIPPED | OUTPATIENT
Start: 2025-04-01 | End: 2025-04-08

## 2025-04-03 NOTE — PROGRESS NOTES
Name: Wander Barker      : 1990      MRN: [id number]  Encounter Provider: DIANNA Titus  Encounter Date: 4/10/2025   Encounter department: [facility]  :  Assessment & Plan  35 weeks gestation of pregnancy  Continue PNV daily   Continue fetal kick counts daily  Palpitations - s/p Holter monitor rare PACs/ PVCs. No tx or f/u recommended   Common discomforts of pregnancy and precautions including PTL reviewed. Signs and symptoms to report reviewed   Orders:    Strep B DNA probe, amplification          History of  delivery, antepartum  Planning repeat with permanent female sterilization at term scheduled on 25 with WCK      Orders:    Strep B DNA probe, amplification          History of gestational hypertension  Baseline labs WNL  Continue LDASA daily until 36w              Macrosomia of fetus affecting management of mother in third trimester, single or unspecified fetus  3/14/25 @ 31w 5d -2234 grams - 4 lbs 15 oz (92%)          RTO 1 week     History of Present Illness   HPI  Wander Barker is a 34 y.o. female who presents PNV      Denies loss of fluid, vaginal bleeding and abdominal pain.  Confirms frequent fetal movement.  Doing fetal kick counts.  Tolerating PNV well    History obtained from: patient    Review of Systems   Constitutional:  Negative for chills and fever.   Respiratory: Negative.     Cardiovascular: Negative.    Genitourinary: Negative.      Medical History Reviewed by provider this encounter:  Allergies  Meds  Problems     .     Objective   /74   Wt 82.3 kg (181 lb 6.4 oz)   LMP 2024 (Exact Date)   BMI 30.19 kg/m²      Physical Exam  Constitutional:       Appearance: Normal appearance.   Neurological:      Mental Status: She is alert and oriented to person, place, and time.   Psychiatric:         Mood and Affect: Mood normal.         Behavior: Behavior normal.

## 2025-04-03 NOTE — ASSESSMENT & PLAN NOTE
Continue PNV daily   Continue fetal kick counts daily  Palpitations - s/p Holter monitor rare PACs/ PVCs. No tx or f/u recommended   Common discomforts of pregnancy and precautions including PTL reviewed. Signs and symptoms to report reviewed   Orders:    Strep B DNA probe, amplification

## 2025-04-04 ENCOUNTER — TELEPHONE (OUTPATIENT)
Dept: OBGYN CLINIC | Facility: MEDICAL CENTER | Age: 35
End: 2025-04-04

## 2025-04-04 NOTE — TELEPHONE ENCOUNTER
OB nurse navigator attempted to get in contact with patient for 3rd Trimester call but unable to complete call at this time. Patient unavailable. Left message on vm to return call. [employer] message sent.

## 2025-04-10 ENCOUNTER — OFFICE VISIT (OUTPATIENT)
Dept: OBGYN CLINIC | Facility: MEDICAL CENTER | Age: 35
End: 2025-04-10

## 2025-04-10 VITALS — WEIGHT: 181.4 LBS | DIASTOLIC BLOOD PRESSURE: 74 MMHG | BODY MASS INDEX: 30.19 KG/M2 | SYSTOLIC BLOOD PRESSURE: 110 MMHG

## 2025-04-10 DIAGNOSIS — O34.219 HISTORY OF CESAREAN DELIVERY, ANTEPARTUM: Primary | ICD-10-CM

## 2025-04-10 DIAGNOSIS — Z3A.35 35 WEEKS GESTATION OF PREGNANCY: ICD-10-CM

## 2025-04-10 DIAGNOSIS — Z87.59 HISTORY OF GESTATIONAL HYPERTENSION: ICD-10-CM

## 2025-04-10 DIAGNOSIS — O36.63X0 MACROSOMIA OF FETUS AFFECTING MANAGEMENT OF MOTHER IN THIRD TRIMESTER, SINGLE OR UNSPECIFIED FETUS: ICD-10-CM

## 2025-04-10 PROCEDURE — PNV: Performed by: NURSE PRACTITIONER

## 2025-04-10 PROCEDURE — 87150 DNA/RNA AMPLIFIED PROBE: CPT | Performed by: NURSE PRACTITIONER

## 2025-04-10 NOTE — TELEPHONE ENCOUNTER
3RD TRIMESTER CHECK-IN     Overall how are you feeling? Patient reports to be doing well. No current concerns.     Compliant with routine OB appointments? Yes.     Have you completed your 3rd trimester lab work? Yes. Normal GTT.     Have you reviewed the contents of 3rd trimester folder from office? Yes.                Have you decided on a pediatrician? KIARA Leary                            Questions on paperwork to go back to office? Not at this time.  Questions on the baby birth certificate forms? Not at this time.

## 2025-04-14 ENCOUNTER — RESULTS FOLLOW-UP (OUTPATIENT)
Dept: OBGYN CLINIC | Facility: CLINIC | Age: 35
End: 2025-04-14

## 2025-04-14 LAB — GP B STREP DNA SPEC QL NAA+PROBE: NEGATIVE

## 2025-04-15 ENCOUNTER — ROUTINE PRENATAL (OUTPATIENT)
Dept: OBGYN CLINIC | Facility: MEDICAL CENTER | Age: 35
End: 2025-04-15

## 2025-04-15 VITALS — WEIGHT: 185 LBS | SYSTOLIC BLOOD PRESSURE: 122 MMHG | DIASTOLIC BLOOD PRESSURE: 78 MMHG | BODY MASS INDEX: 30.79 KG/M2

## 2025-04-15 DIAGNOSIS — O34.219 HISTORY OF CESAREAN DELIVERY, ANTEPARTUM: ICD-10-CM

## 2025-04-15 DIAGNOSIS — Z3A.36 36 WEEKS GESTATION OF PREGNANCY: Primary | ICD-10-CM

## 2025-04-15 DIAGNOSIS — O36.63X0 MACROSOMIA OF FETUS AFFECTING MANAGEMENT OF MOTHER IN THIRD TRIMESTER, SINGLE OR UNSPECIFIED FETUS: ICD-10-CM

## 2025-04-15 PROCEDURE — PNV: Performed by: OBSTETRICS & GYNECOLOGY

## 2025-04-15 NOTE — PROGRESS NOTES
Name: Wander Barker      : 1990      MRN: [id number]  Encounter Provider: Karmen Sunshine MD  Encounter Date: 4/15/2025   Encounter department: [facility]  :  Assessment & Plan  History of  delivery, antepartum       has repeat  C/S scheduled  May  6    Macrosomia of fetus affecting management of mother in third trimester, single or unspecified fetus       no further  US scheduled at  [facility]   36 weeks gestation of pregnancy       Gbs negative   FKC and  labor precautions reviewed        History of Present Illness   HPI  Wander Barker is a 34 y.o. female who presents routine  prenatal visit   No LOF or  VB  no contractions   +FM  History obtained from: patient    Review of Systems       Objective   /78   Wt 83.9 kg (185 lb)   LMP 2024 (Exact Date)   BMI 30.79 kg/m²      Physical Exam  Vitals reviewed.   Constitutional:       Appearance: Normal appearance.   HENT:      Head: Normocephalic and atraumatic.      Nose: Nose normal.   Eyes:      Conjunctiva/sclera: Conjunctivae normal.   Pulmonary:      Effort: Pulmonary effort is normal.   Abdominal:      Comments: Gravid  non tender     Genitourinary:     General: Normal vulva.      Comments: Cervical exam - FT/ thick / -3  Neurological:      General: No focal deficit present.      Mental Status: She is alert and oriented to person, place, and time.   Psychiatric:         Mood and Affect: Mood normal.         Behavior: Behavior normal.

## 2025-04-25 ENCOUNTER — ROUTINE PRENATAL (OUTPATIENT)
Age: 35
End: 2025-04-25

## 2025-04-25 VITALS — WEIGHT: 182 LBS | BODY MASS INDEX: 30.29 KG/M2 | DIASTOLIC BLOOD PRESSURE: 80 MMHG | SYSTOLIC BLOOD PRESSURE: 126 MMHG

## 2025-04-25 DIAGNOSIS — O34.219 HISTORY OF CESAREAN DELIVERY, ANTEPARTUM: ICD-10-CM

## 2025-04-25 DIAGNOSIS — Z87.59 HISTORY OF GESTATIONAL HYPERTENSION: ICD-10-CM

## 2025-04-25 DIAGNOSIS — Z3A.37 37 WEEKS GESTATION OF PREGNANCY: Primary | ICD-10-CM

## 2025-04-25 DIAGNOSIS — B00.9 HERPES SIMPLEX TYPE 1 INFECTION: ICD-10-CM

## 2025-04-25 PROCEDURE — PNV: Performed by: OBSTETRICS & GYNECOLOGY

## 2025-04-25 NOTE — PROGRESS NOTES
Name: Wander Barker      : 1990      MRN: [id number]  Encounter Provider: Karmen Sunshine MD  Encounter Date: 2025   Encounter department: [facility]  :  Assessment & Plan  History of  delivery, antepartum       has repeat C/S  scheduled  with me  on     History of gestational hypertension       doing well    Normotensive  no signs or  symptoms  of  preeclampsia    37 weeks gestation of pregnancy       FKC and  Labor precuations  reviewed        History of Present Illness   HPI  Wander Barker is a 34 y.o. female who presents doing well  No LOF or  VB , no contractions  +FM   Pelvic pressure    History obtained from: patient    Review of Systems       Objective   /80   Wt 82.6 kg (182 lb)   LMP 2024 (Exact Date)   BMI 30.29 kg/m²      Physical Exam  Vitals reviewed.   Constitutional:       Appearance: Normal appearance.   HENT:      Head: Normocephalic and atraumatic.      Nose: Nose normal.   Eyes:      Conjunctiva/sclera: Conjunctivae normal.   Pulmonary:      Effort: Pulmonary effort is normal.   Abdominal:      Comments: Gravid non  tender     Genitourinary:     General: Normal vulva.      Comments: Cervical exam - closed  thick    Neurological:      General: No focal deficit present.      Mental Status: She is alert and oriented to person, place, and time.   Psychiatric:         Mood and Affect: Mood normal.         Behavior: Behavior normal.

## 2025-04-28 ENCOUNTER — HOSPITAL ENCOUNTER (OUTPATIENT)
Facility: HOSPITAL | Age: 35
Discharge: HOME | End: 2025-04-28
Attending: OBSTETRICS & GYNECOLOGY | Admitting: OBSTETRICS & GYNECOLOGY
Payer: COMMERCIAL

## 2025-04-28 VITALS
SYSTOLIC BLOOD PRESSURE: 132 MMHG | WEIGHT: 180.6 LBS | HEART RATE: 71 BPM | BODY MASS INDEX: 30.09 KG/M2 | DIASTOLIC BLOOD PRESSURE: 86 MMHG | HEIGHT: 65 IN

## 2025-04-28 PROBLEM — Z3A.38 38 WEEKS GESTATION OF PREGNANCY: Status: ACTIVE | Noted: 2024-10-30

## 2025-04-28 PROBLEM — R03.0 ELEVATED BLOOD PRESSURE READING: Status: ACTIVE | Noted: 2025-04-28

## 2025-04-28 LAB
ABO GROUP BLD: NORMAL
ALBUMIN SERPL BCG-MCNC: 3.5 G/DL (ref 3.5–5)
ALP SERPL-CCNC: 126 U/L (ref 34–104)
ALT SERPL W P-5'-P-CCNC: 9 U/L (ref 7–52)
ANION GAP SERPL CALCULATED.3IONS-SCNC: 11 MMOL/L (ref 4–13)
AST SERPL W P-5'-P-CCNC: 16 U/L (ref 13–39)
BILIRUB SERPL-MCNC: 0.31 MG/DL (ref 0.2–1)
BLD GP AB SCN SERPL QL: NEGATIVE
BUN SERPL-MCNC: 8 MG/DL (ref 5–25)
CALCIUM SERPL-MCNC: 9 MG/DL (ref 8.4–10.2)
CHLORIDE SERPL-SCNC: 106 MMOL/L (ref 96–108)
CO2 SERPL-SCNC: 19 MMOL/L (ref 21–32)
CREAT SERPL-MCNC: 0.66 MG/DL (ref 0.6–1.3)
CREAT UR-MCNC: 43.1 MG/DL
ERYTHROCYTE [DISTWIDTH] IN BLOOD BY AUTOMATED COUNT: 12.5 % (ref 11.6–15.1)
GFR SERPL CREATININE-BSD FRML MDRD: 116 ML/MIN/1.73SQ M
GLUCOSE SERPL-MCNC: 86 MG/DL (ref 65–140)
HCT VFR BLD AUTO: 35.3 % (ref 34.8–46.1)
HGB BLD-MCNC: 12.5 G/DL (ref 11.5–15.4)
MCH RBC QN AUTO: 32.1 PG (ref 26.8–34.3)
MCHC RBC AUTO-ENTMCNC: 35.4 G/DL (ref 31.4–37.4)
MCV RBC AUTO: 91 FL (ref 82–98)
PLATELET # BLD AUTO: 181 THOUSANDS/UL (ref 149–390)
PMV BLD AUTO: 11.4 FL (ref 8.9–12.7)
POTASSIUM SERPL-SCNC: 3.6 MMOL/L (ref 3.5–5.3)
PROT SERPL-MCNC: 6.8 G/DL (ref 6.4–8.4)
PROT UR-MCNC: 7.6 MG/DL
PROT/CREAT UR: 0.2 MG/G{CREAT}
RBC # BLD AUTO: 3.89 MILLION/UL (ref 3.81–5.12)
RH BLD: POSITIVE
SODIUM SERPL-SCNC: 136 MMOL/L (ref 135–147)
SPECIMEN EXPIRATION DATE: NORMAL
WBC # BLD AUTO: 8.61 THOUSAND/UL (ref 4.31–10.16)

## 2025-04-28 PROCEDURE — 85027 COMPLETE CBC AUTOMATED: CPT

## 2025-04-28 PROCEDURE — G0463 HOSPITAL OUTPT CLINIC VISIT: HCPCS

## 2025-04-28 PROCEDURE — 80053 COMPREHEN METABOLIC PANEL: CPT

## 2025-04-28 PROCEDURE — 86900 BLOOD TYPING SEROLOGIC ABO: CPT

## 2025-04-28 PROCEDURE — 86850 RBC ANTIBODY SCREEN: CPT

## 2025-04-28 PROCEDURE — 86901 BLOOD TYPING SEROLOGIC RH(D): CPT

## 2025-04-28 PROCEDURE — 99213 OFFICE O/P EST LOW 20 MIN: CPT | Performed by: OBSTETRICS & GYNECOLOGY

## 2025-04-28 PROCEDURE — 99203 OFFICE O/P NEW LOW 30 MIN: CPT

## 2025-04-28 PROCEDURE — 82570 ASSAY OF URINE CREATININE: CPT

## 2025-04-28 PROCEDURE — 86780 TREPONEMA PALLIDUM: CPT

## 2025-04-28 PROCEDURE — 84156 ASSAY OF PROTEIN URINE: CPT

## 2025-04-29 ENCOUNTER — CLINICAL SUPPORT (OUTPATIENT)
Dept: OBGYN CLINIC | Facility: MEDICAL CENTER | Age: 35
End: 2025-04-29

## 2025-04-29 ENCOUNTER — TELEPHONE (OUTPATIENT)
Dept: OBGYN CLINIC | Facility: MEDICAL CENTER | Age: 35
End: 2025-04-29

## 2025-04-29 DIAGNOSIS — Z01.30 BP CHECK: Primary | ICD-10-CM

## 2025-04-29 LAB — TREPONEMA PALLIDUM IGG+IGM AB [PRESENCE] IN SERUM OR PLASMA BY IMMUNOASSAY: NORMAL

## 2025-04-29 NOTE — PROGRESS NOTES
L&D Triage Note - OB/GYN  Wander Barker 34 y.o. female MRN: [id number]  Unit/Bed#: LD TRIAGE 2 Encounter: [id number]      ASSESSMENT:    Wander Barker is a 34 y.o.  at 38w1d who was evaluated today in triage for contractions and elevated BPs at home. SVE FT/50/-3 with irritability and frequent contractions on tocometer. Will plan for 2 hour recheck. Blood pressures in triage so far wnl, follow up preeclampsia labs. Disposition pending blood pressures and 2hr re-examination    PLAN:    1) Contractions  - SVE: FT/50/-3, unchanged from 4/10/25  - [place]: Irritability + contractions every 1-2 minutes  - Encouraged PO hydration  - Plan for 2 hour recheck    2) Elevated BPs at home  - 150s/90s, taken by health care professional with automated cuff  - History of gestational hypertension  - Bps wnl so far in triage, continue to monitor  - Follow up CBC/CMP and p:c ratio      SUBJECTIVE:    Wander Barker 34 y.o.  at 38w1d with an Estimated Date of Delivery: 25 presenting with contractions and elevated Bps at home.  Patient states she felt 5 contractions between about 1 PM to 5:45 PM.  She says she felt her abdomen tightening for about 30 seconds.    Patient was at home and felt like her heart was racing so she took her blood pressure and on both arms it was 150 over 90s.  She otherwise denies headache, visual changes, right upper quadrant pain, swelling.    Her past obstetrical history is significant for 1 prior c section due to suspected fetal macrosomia and GHTN. This pregnancy has been notable for suspected fetal macrosomia.    Contractions: endorses irregular contractions  Leakage of fluid: Denies  Vaginal Bleeding: Denies  Fetal movement: present    OBJECTIVE:    Vitals:    25 2147   BP: 128/79   Pulse: 79       ROS:  Constitutional: Negative  Respiratory: Negative  Cardiovascular: Negative    Gastrointestinal: Negative    General Physical Exam:  General: Well appearing, no  distress  Respiratory: Unlabored breathing  Cardiovascular: Regular rate.  Abdomen: Soft, gravid, nontender  Fundal Height: Appropriate for gestational age.  Extremities: Warm and well perfused.  Non tender.      Fetal monitoring:  Fetal heart rate: Baseline 130bpm / moderate variability / 15 x 15 accelerations present / no decelerations  [place]: Contractions q1-2 min      Cervical Exam  SVE: FT/50/-3        Lina Riggs MD  4/28/2025  10:03 PM

## 2025-04-29 NOTE — TELEPHONE ENCOUNTER
Who called:STAFF     Is the patient Pregnant ? yes  If so, How many weeks? 37 wks    Reason for the Call: to make a f/u nurse appt for BP    Action Taken: scheduled appt for today 04/29 @ 10 for BP check      Outcome/Plan/ Recommendations:  04/29 @ 10 for bop check

## 2025-04-29 NOTE — QUICK NOTE
Patient re-evaluated and unchanged at FT/50/-3. Following IV fluids, contractions spaced out. All blood pressures within normal limits in triage for over two hours. CBC/CMP wnl and p:c ratio 0.2.     Patient is safe for discharge to home with a blood pressure check in the office tomorrow. She is aware that should she have another elevated BP she will be sent in for monitoring and possibly delivery.     Dr. Kern also in room

## 2025-04-30 DIAGNOSIS — B00.9 HSV INFECTION: ICD-10-CM

## 2025-04-30 NOTE — TELEPHONE ENCOUNTER
CHANGE OF PHARMACY    Reason for call:   [x] Refill   [] Prior Auth  [] Other:     Office:   [] PCP/Provider -   [x] Specialty/Provider - Ob/Gyn    Medication: valACYclovir (VALTREX) 500 mg tablet    Dose/Frequency: Take 1 tablet (500 mg total) by mouth 2 (two) times a day,     Quantity: 90 tablet    Pharmacy: [facility]/pharmacy #[id number] - [place] -     Local Pharmacy   Does the patient have enough for 3 days?   [] Yes   [x] No - Send as HP to POD    Mail Away Pharmacy   Does the patient have enough for 10 days?   [] Yes   [] No - Send as HP to POD

## 2025-05-01 RX ORDER — VALACYCLOVIR HYDROCHLORIDE 500 MG/1
500 TABLET, FILM COATED ORAL 2 TIMES DAILY
Qty: 90 TABLET | Refills: 0 | Status: SHIPPED | OUTPATIENT
Start: 2025-05-01 | End: 2026-04-26

## 2025-05-02 ENCOUNTER — ROUTINE PRENATAL (OUTPATIENT)
Age: 35
End: 2025-05-02

## 2025-05-02 VITALS — DIASTOLIC BLOOD PRESSURE: 76 MMHG | SYSTOLIC BLOOD PRESSURE: 118 MMHG | WEIGHT: 183 LBS | BODY MASS INDEX: 30.45 KG/M2

## 2025-05-02 DIAGNOSIS — Z3A.38 38 WEEKS GESTATION OF PREGNANCY: Primary | ICD-10-CM

## 2025-05-02 DIAGNOSIS — R03.0 ELEVATED BLOOD PRESSURE READING: ICD-10-CM

## 2025-05-02 DIAGNOSIS — B00.9 HERPES SIMPLEX TYPE 1 INFECTION: ICD-10-CM

## 2025-05-02 DIAGNOSIS — Z87.59 HISTORY OF GESTATIONAL HYPERTENSION: ICD-10-CM

## 2025-05-02 DIAGNOSIS — O36.63X0 MACROSOMIA OF FETUS AFFECTING MANAGEMENT OF MOTHER IN THIRD TRIMESTER, SINGLE OR UNSPECIFIED FETUS: ICD-10-CM

## 2025-05-02 DIAGNOSIS — O34.219 HISTORY OF CESAREAN DELIVERY, ANTEPARTUM: ICD-10-CM

## 2025-05-02 PROCEDURE — PNV: Performed by: OBSTETRICS & GYNECOLOGY

## 2025-05-02 NOTE — PROGRESS NOTES
Name: Wander Barker      : 1990      MRN: [id number]  Encounter Provider: Karmen Sunshine MD  Encounter Date: 2025   Encounter department: [facility] OB/GY[facility]  :  Assessment & Plan  History of  delivery, antepartum       Has scheduled  repeat  C/S  on Tuesday  with me   No  tubal   Macrosomia of fetus affecting management of mother in third trimester, single or unspecified fetus       does not  desire  TOLAC   History of gestational hypertension       only one elevated BP  reading  this pregnancy  / does not  meet criteria    Herpes simplex type 1 infection       on Valtrex   Elevated blood pressure reading         38 weeks gestation of pregnancy       FKC and  Labor precautions reviewed        History of Present Illness   HPI  Wander Barker is a 34 y.o. female who presents for routine  prenatal visit   No  LOF or  VB  no contractions   +FM  History obtained from: patient    Review of Systems       Objective   /76   Wt 83 kg (183 lb)   LMP 2024 (Exact Date)   BMI 30.45 kg/m²      Physical Exam  Vitals reviewed.   Constitutional:       Appearance: Normal appearance.   HENT:      Head: Normocephalic and atraumatic.      Nose: Nose normal.   Eyes:      Conjunctiva/sclera: Conjunctivae normal.   Pulmonary:      Effort: Pulmonary effort is normal.   Abdominal:      Comments: Gravid  non tender     Genitourinary:     General: Normal vulva.      Comments: Ft/thick / -3  Neurological:      General: No focal deficit present.      Mental Status: She is alert and oriented to person, place, and time.   Psychiatric:         Mood and Affect: Mood normal.         Behavior: Behavior normal.

## 2025-05-05 ENCOUNTER — ANESTHESIA EVENT (INPATIENT)
Dept: LABOR AND DELIVERY | Facility: HOSPITAL | Age: 35
End: 2025-05-05
Payer: COMMERCIAL

## 2025-05-06 ENCOUNTER — HOSPITAL ENCOUNTER (INPATIENT)
Facility: HOSPITAL | Age: 35
LOS: 2 days | Discharge: HOME | End: 2025-05-08
Attending: OBSTETRICS & GYNECOLOGY | Admitting: OBSTETRICS & GYNECOLOGY
Payer: COMMERCIAL

## 2025-05-06 ENCOUNTER — ANESTHESIA (INPATIENT)
Dept: LABOR AND DELIVERY | Facility: HOSPITAL | Age: 35
End: 2025-05-06
Payer: COMMERCIAL

## 2025-05-06 DIAGNOSIS — Z98.891 STATUS POST PRIMARY LOW TRANSVERSE CESAREAN SECTION: Primary | ICD-10-CM

## 2025-05-06 DIAGNOSIS — O34.219 HISTORY OF CESAREAN DELIVERY, ANTEPARTUM: ICD-10-CM

## 2025-05-06 DIAGNOSIS — Z3A.39 39 WEEKS GESTATION OF PREGNANCY: ICD-10-CM

## 2025-05-06 LAB
ABO GROUP BLD: NORMAL
ALBUMIN SERPL BCG-MCNC: 3.5 G/DL (ref 3.5–5)
ALP SERPL-CCNC: 137 U/L (ref 34–104)
ALT SERPL W P-5'-P-CCNC: 10 U/L (ref 7–52)
ANION GAP SERPL CALCULATED.3IONS-SCNC: 8 MMOL/L (ref 4–13)
AST SERPL W P-5'-P-CCNC: 17 U/L (ref 13–39)
BASE EXCESS BLDCOA CALC-SCNC: -6.4 MMOL/L (ref 3–11)
BASE EXCESS BLDCOV CALC-SCNC: -4.4 MMOL/L (ref 1–9)
BILIRUB SERPL-MCNC: 0.41 MG/DL (ref 0.2–1)
BLD GP AB SCN SERPL QL: NEGATIVE
BUN SERPL-MCNC: 8 MG/DL (ref 5–25)
CALCIUM SERPL-MCNC: 8.4 MG/DL (ref 8.4–10.2)
CHLORIDE SERPL-SCNC: 107 MMOL/L (ref 96–108)
CO2 SERPL-SCNC: 21 MMOL/L (ref 21–32)
CREAT SERPL-MCNC: 0.71 MG/DL (ref 0.6–1.3)
CREAT UR-MCNC: 87.7 MG/DL
ERYTHROCYTE [DISTWIDTH] IN BLOOD BY AUTOMATED COUNT: 12.4 % (ref 11.6–15.1)
GFR SERPL CREATININE-BSD FRML MDRD: 112 ML/MIN/1.73SQ M
GLUCOSE SERPL-MCNC: 79 MG/DL (ref 65–140)
HCO3 BLDCOA-SCNC: 22 MMOL/L (ref 17.3–27.3)
HCO3 BLDCOV-SCNC: 22.9 MMOL/L (ref 12.2–28.6)
HCT VFR BLD AUTO: 35.2 % (ref 34.8–46.1)
HGB BLD-MCNC: 12.2 G/DL (ref 11.5–15.4)
MCH RBC QN AUTO: 32 PG (ref 26.8–34.3)
MCHC RBC AUTO-ENTMCNC: 34.7 G/DL (ref 31.4–37.4)
MCV RBC AUTO: 92 FL (ref 82–98)
O2 CT VFR BLDCOA CALC: 6.4 ML/DL
OXYHGB MFR BLDCOA: 30 %
OXYHGB MFR BLDCOV: 26.1 %
PCO2 BLDCOA: 55 MM[HG] (ref 30–60)
PCO2 BLDCOV: 50.5 MM HG (ref 27–43)
PH BLDCOA: 7.22 [PH] (ref 7.23–7.43)
PH BLDCOV: 7.27 [PH] (ref 7.19–7.49)
PLATELET # BLD AUTO: 157 THOUSANDS/UL (ref 149–390)
PMV BLD AUTO: 11.7 FL (ref 8.9–12.7)
PO2 BLDCOA: 17 MM HG (ref 5–25)
PO2 BLDCOV: 14 MM HG (ref 15–45)
POTASSIUM SERPL-SCNC: 3.6 MMOL/L (ref 3.5–5.3)
PROT SERPL-MCNC: 6.9 G/DL (ref 6.4–8.4)
PROT UR-MCNC: 9.6 MG/DL
PROT/CREAT UR: 0.1 MG/G{CREAT}
RBC # BLD AUTO: 3.81 MILLION/UL (ref 3.81–5.12)
RH BLD: POSITIVE
SAO2 % BLDCOV: 5.5 ML/DL
SODIUM SERPL-SCNC: 136 MMOL/L (ref 135–147)
SPECIMEN EXPIRATION DATE: NORMAL
TREPONEMA PALLIDUM IGG+IGM AB [PRESENCE] IN SERUM OR PLASMA BY IMMUNOASSAY: NORMAL
WBC # BLD AUTO: 8.95 THOUSAND/UL (ref 4.31–10.16)

## 2025-05-06 PROCEDURE — 80053 COMPREHEN METABOLIC PANEL: CPT

## 2025-05-06 PROCEDURE — 86901 BLOOD TYPING SEROLOGIC RH(D): CPT

## 2025-05-06 PROCEDURE — 82570 ASSAY OF URINE CREATININE: CPT

## 2025-05-06 PROCEDURE — 94762 N-INVAS EAR/PLS OXIMTRY CONT: CPT

## 2025-05-06 PROCEDURE — 86780 TREPONEMA PALLIDUM: CPT

## 2025-05-06 PROCEDURE — 86900 BLOOD TYPING SEROLOGIC ABO: CPT

## 2025-05-06 PROCEDURE — 59510 CESAREAN DELIVERY: CPT | Performed by: OBSTETRICS & GYNECOLOGY

## 2025-05-06 PROCEDURE — 82805 BLOOD GASES W/O2 SATURATION: CPT | Performed by: OBSTETRICS & GYNECOLOGY

## 2025-05-06 PROCEDURE — 86850 RBC ANTIBODY SCREEN: CPT

## 2025-05-06 PROCEDURE — NC001 PR NO CHARGE: Performed by: OBSTETRICS & GYNECOLOGY

## 2025-05-06 PROCEDURE — 85027 COMPLETE CBC AUTOMATED: CPT

## 2025-05-06 PROCEDURE — 84156 ASSAY OF PROTEIN URINE: CPT

## 2025-05-06 RX ORDER — ONDANSETRON 2 MG/ML
4 INJECTION INTRAMUSCULAR; INTRAVENOUS ONCE AS NEEDED
Status: DISCONTINUED | OUTPATIENT
Start: 2025-05-06 | End: 2025-05-08 | Stop reason: HOSPADM

## 2025-05-06 RX ORDER — OXYCODONE HYDROCHLORIDE 5 MG/1
10 TABLET ORAL EVERY 4 HOURS PRN
Refills: 0 | Status: DISCONTINUED | OUTPATIENT
Start: 2025-05-06 | End: 2025-05-06 | Stop reason: SDUPTHER

## 2025-05-06 RX ORDER — GLYCOPYRROLATE 0.2 MG/ML
INJECTION INTRAMUSCULAR; INTRAVENOUS AS NEEDED
Status: DISCONTINUED | OUTPATIENT
Start: 2025-05-06 | End: 2025-05-06

## 2025-05-06 RX ORDER — MORPHINE SULFATE 0.5 MG/ML
INJECTION, SOLUTION EPIDURAL; INTRATHECAL; INTRAVENOUS AS NEEDED
Status: DISCONTINUED | OUTPATIENT
Start: 2025-05-06 | End: 2025-05-06

## 2025-05-06 RX ORDER — VALACYCLOVIR HYDROCHLORIDE 500 MG/1
500 TABLET, FILM COATED ORAL 2 TIMES DAILY
Status: DISCONTINUED | OUTPATIENT
Start: 2025-05-06 | End: 2025-05-08 | Stop reason: HOSPADM

## 2025-05-06 RX ORDER — SIMETHICONE 80 MG
80 TABLET,CHEWABLE ORAL 4 TIMES DAILY PRN
Status: DISCONTINUED | OUTPATIENT
Start: 2025-05-06 | End: 2025-05-08 | Stop reason: HOSPADM

## 2025-05-06 RX ORDER — NALOXONE HYDROCHLORIDE 0.4 MG/ML
0.1 INJECTION, SOLUTION INTRAMUSCULAR; INTRAVENOUS; SUBCUTANEOUS
Status: ACTIVE | OUTPATIENT
Start: 2025-05-06 | End: 2025-05-07

## 2025-05-06 RX ORDER — OXYCODONE HYDROCHLORIDE 5 MG/1
5 TABLET ORAL EVERY 4 HOURS PRN
Refills: 0 | Status: DISCONTINUED | OUTPATIENT
Start: 2025-05-06 | End: 2025-05-06 | Stop reason: SDUPTHER

## 2025-05-06 RX ORDER — METOCLOPRAMIDE HYDROCHLORIDE 5 MG/ML
INJECTION INTRAMUSCULAR; INTRAVENOUS
Status: COMPLETED
Start: 2025-05-06 | End: 2025-05-06

## 2025-05-06 RX ORDER — OXYCODONE HYDROCHLORIDE 5 MG/1
5 TABLET ORAL EVERY 4 HOURS PRN
Status: DISCONTINUED | OUTPATIENT
Start: 2025-05-07 | End: 2025-05-08 | Stop reason: HOSPADM

## 2025-05-06 RX ORDER — ENOXAPARIN SODIUM 100 MG/ML
40 INJECTION SUBCUTANEOUS DAILY
Status: DISCONTINUED | OUTPATIENT
Start: 2025-05-07 | End: 2025-05-08 | Stop reason: HOSPADM

## 2025-05-06 RX ORDER — PROMETHAZINE HYDROCHLORIDE 25 MG/ML
6.25 INJECTION, SOLUTION INTRAMUSCULAR; INTRAVENOUS ONCE AS NEEDED
Status: DISCONTINUED | OUTPATIENT
Start: 2025-05-06 | End: 2025-05-08 | Stop reason: HOSPADM

## 2025-05-06 RX ORDER — CEFAZOLIN SODIUM 2 G/50ML
2000 SOLUTION INTRAVENOUS ONCE
Status: COMPLETED | OUTPATIENT
Start: 2025-05-06 | End: 2025-05-06

## 2025-05-06 RX ORDER — DOCUSATE SODIUM 100 MG/1
100 CAPSULE, LIQUID FILLED ORAL 2 TIMES DAILY
Status: DISCONTINUED | OUTPATIENT
Start: 2025-05-06 | End: 2025-05-08 | Stop reason: HOSPADM

## 2025-05-06 RX ORDER — ONDANSETRON 2 MG/ML
INJECTION INTRAMUSCULAR; INTRAVENOUS AS NEEDED
Status: DISCONTINUED | OUTPATIENT
Start: 2025-05-06 | End: 2025-05-06

## 2025-05-06 RX ORDER — KETOROLAC TROMETHAMINE 30 MG/ML
INJECTION, SOLUTION INTRAMUSCULAR; INTRAVENOUS AS NEEDED
Status: DISCONTINUED | OUTPATIENT
Start: 2025-05-06 | End: 2025-05-06

## 2025-05-06 RX ORDER — ACETAMINOPHEN 325 MG/1
650 TABLET ORAL EVERY 6 HOURS SCHEDULED
Status: DISCONTINUED | OUTPATIENT
Start: 2025-05-06 | End: 2025-05-08 | Stop reason: HOSPADM

## 2025-05-06 RX ORDER — HYDROMORPHONE HCL/PF 1 MG/ML
0.5 SYRINGE (ML) INJECTION EVERY 2 HOUR PRN
Refills: 0 | Status: DISCONTINUED | OUTPATIENT
Start: 2025-05-06 | End: 2025-05-06 | Stop reason: SDUPTHER

## 2025-05-06 RX ORDER — IBUPROFEN 600 MG/1
600 TABLET, FILM COATED ORAL EVERY 6 HOURS
Status: DISCONTINUED | OUTPATIENT
Start: 2025-05-07 | End: 2025-05-06

## 2025-05-06 RX ORDER — ONDANSETRON 2 MG/ML
4 INJECTION INTRAMUSCULAR; INTRAVENOUS EVERY 8 HOURS PRN
Status: DISCONTINUED | OUTPATIENT
Start: 2025-05-06 | End: 2025-05-06

## 2025-05-06 RX ORDER — OXYTOCIN/RINGER'S LACTATE 30/500 ML
PLASTIC BAG, INJECTION (ML) INTRAVENOUS
Status: COMPLETED
Start: 2025-05-06 | End: 2025-05-06

## 2025-05-06 RX ORDER — DIPHENHYDRAMINE HCL 25 MG
25 TABLET ORAL EVERY 6 HOURS PRN
Status: DISCONTINUED | OUTPATIENT
Start: 2025-05-06 | End: 2025-05-08 | Stop reason: HOSPADM

## 2025-05-06 RX ORDER — DIPHENHYDRAMINE HYDROCHLORIDE 50 MG/ML
INJECTION, SOLUTION INTRAMUSCULAR; INTRAVENOUS AS NEEDED
Status: DISCONTINUED | OUTPATIENT
Start: 2025-05-06 | End: 2025-05-06

## 2025-05-06 RX ORDER — KETOROLAC TROMETHAMINE 30 MG/ML
15 INJECTION, SOLUTION INTRAMUSCULAR; INTRAVENOUS EVERY 6 HOURS SCHEDULED
Status: DISCONTINUED | OUTPATIENT
Start: 2025-05-06 | End: 2025-05-06

## 2025-05-06 RX ORDER — KETOROLAC TROMETHAMINE 30 MG/ML
15 INJECTION, SOLUTION INTRAMUSCULAR; INTRAVENOUS EVERY 6 HOURS
Status: COMPLETED | OUTPATIENT
Start: 2025-05-06 | End: 2025-05-07

## 2025-05-06 RX ORDER — ONDANSETRON 2 MG/ML
4 INJECTION INTRAMUSCULAR; INTRAVENOUS EVERY 8 HOURS PRN
Status: DISCONTINUED | OUTPATIENT
Start: 2025-05-06 | End: 2025-05-08 | Stop reason: HOSPADM

## 2025-05-06 RX ORDER — METOCLOPRAMIDE HYDROCHLORIDE 5 MG/ML
INJECTION INTRAMUSCULAR; INTRAVENOUS AS NEEDED
Status: DISCONTINUED | OUTPATIENT
Start: 2025-05-06 | End: 2025-05-06

## 2025-05-06 RX ORDER — OXYTOCIN/RINGER'S LACTATE 30/500 ML
PLASTIC BAG, INJECTION (ML) INTRAVENOUS CONTINUOUS PRN
Status: DISCONTINUED | OUTPATIENT
Start: 2025-05-06 | End: 2025-05-06

## 2025-05-06 RX ORDER — VALACYCLOVIR HYDROCHLORIDE 500 MG/1
500 TABLET, FILM COATED ORAL 2 TIMES DAILY
Status: DISCONTINUED | OUTPATIENT
Start: 2025-05-06 | End: 2025-05-06

## 2025-05-06 RX ORDER — ONDANSETRON 2 MG/ML
INJECTION INTRAMUSCULAR; INTRAVENOUS
Status: COMPLETED
Start: 2025-05-06 | End: 2025-05-06

## 2025-05-06 RX ORDER — PHENYLEPHRINE HYDROCHLORIDE 10 MG/ML
INJECTION INTRAVENOUS
Status: DISPENSED
Start: 2025-05-06 | End: 2025-05-06

## 2025-05-06 RX ORDER — CALCIUM CARBONATE 500 MG/1
1000 TABLET, CHEWABLE ORAL DAILY PRN
Status: DISCONTINUED | OUTPATIENT
Start: 2025-05-06 | End: 2025-05-08 | Stop reason: HOSPADM

## 2025-05-06 RX ORDER — BUPIVACAINE HYDROCHLORIDE 7.5 MG/ML
INJECTION, SOLUTION INTRASPINAL AS NEEDED
Status: DISCONTINUED | OUTPATIENT
Start: 2025-05-06 | End: 2025-05-06

## 2025-05-06 RX ORDER — FENTANYL CITRATE/PF 50 MCG/ML
25 SYRINGE (ML) INJECTION
Status: DISCONTINUED | OUTPATIENT
Start: 2025-05-06 | End: 2025-05-08 | Stop reason: HOSPADM

## 2025-05-06 RX ORDER — HYDROMORPHONE HCL/PF 1 MG/ML
0.25 SYRINGE (ML) INJECTION
Status: DISCONTINUED | OUTPATIENT
Start: 2025-05-06 | End: 2025-05-08 | Stop reason: HOSPADM

## 2025-05-06 RX ORDER — SODIUM CHLORIDE, SODIUM LACTATE, POTASSIUM CHLORIDE, CALCIUM CHLORIDE 600; 310; 30; 20 MG/100ML; MG/100ML; MG/100ML; MG/100ML
125 INJECTION, SOLUTION INTRAVENOUS CONTINUOUS
Status: DISCONTINUED | OUTPATIENT
Start: 2025-05-06 | End: 2025-05-08 | Stop reason: HOSPADM

## 2025-05-06 RX ORDER — HYDROMORPHONE HCL/PF 1 MG/ML
0.5 SYRINGE (ML) INJECTION EVERY 2 HOUR PRN
Refills: 0 | Status: DISCONTINUED | OUTPATIENT
Start: 2025-05-06 | End: 2025-05-08 | Stop reason: HOSPADM

## 2025-05-06 RX ORDER — IBUPROFEN 600 MG/1
600 TABLET, FILM COATED ORAL EVERY 6 HOURS
Status: DISCONTINUED | OUTPATIENT
Start: 2025-05-07 | End: 2025-05-08 | Stop reason: HOSPADM

## 2025-05-06 RX ORDER — OXYCODONE HYDROCHLORIDE 5 MG/1
10 TABLET ORAL EVERY 4 HOURS PRN
Status: DISCONTINUED | OUTPATIENT
Start: 2025-05-07 | End: 2025-05-08 | Stop reason: HOSPADM

## 2025-05-06 RX ORDER — MORPHINE SULFATE 0.5 MG/ML
INJECTION, SOLUTION EPIDURAL; INTRATHECAL; INTRAVENOUS
Status: COMPLETED
Start: 2025-05-06 | End: 2025-05-06

## 2025-05-06 RX ORDER — OXYCODONE HYDROCHLORIDE 10 MG/1
10 TABLET ORAL EVERY 4 HOURS PRN
Refills: 0 | Status: DISCONTINUED | OUTPATIENT
Start: 2025-05-06 | End: 2025-05-08 | Stop reason: HOSPADM

## 2025-05-06 RX ORDER — KETOROLAC TROMETHAMINE 30 MG/ML
INJECTION, SOLUTION INTRAMUSCULAR; INTRAVENOUS
Status: COMPLETED
Start: 2025-05-06 | End: 2025-05-06

## 2025-05-06 RX ORDER — DIPHENHYDRAMINE HYDROCHLORIDE 50 MG/ML
INJECTION, SOLUTION INTRAMUSCULAR; INTRAVENOUS
Status: COMPLETED
Start: 2025-05-06 | End: 2025-05-06

## 2025-05-06 RX ORDER — SODIUM CHLORIDE, SODIUM LACTATE, POTASSIUM CHLORIDE, CALCIUM CHLORIDE 600; 310; 30; 20 MG/100ML; MG/100ML; MG/100ML; MG/100ML
125 INJECTION, SOLUTION INTRAVENOUS CONTINUOUS
Status: DISCONTINUED | OUTPATIENT
Start: 2025-05-06 | End: 2025-05-06

## 2025-05-06 RX ORDER — GLYCOPYRROLATE 0.2 MG/ML
INJECTION INTRAMUSCULAR; INTRAVENOUS
Status: COMPLETED
Start: 2025-05-06 | End: 2025-05-06

## 2025-05-06 RX ORDER — OXYTOCIN/RINGER'S LACTATE 30/500 ML
62.5 PLASTIC BAG, INJECTION (ML) INTRAVENOUS ONCE
Status: COMPLETED | OUTPATIENT
Start: 2025-05-06 | End: 2025-05-06

## 2025-05-06 RX ORDER — KETOROLAC TROMETHAMINE 30 MG/ML
INJECTION, SOLUTION INTRAMUSCULAR; INTRAVENOUS
Status: DISCONTINUED
Start: 2025-05-06 | End: 2025-05-06 | Stop reason: WASHOUT

## 2025-05-06 RX ORDER — OXYCODONE HYDROCHLORIDE 5 MG/1
5 TABLET ORAL EVERY 4 HOURS PRN
Refills: 0 | Status: DISCONTINUED | OUTPATIENT
Start: 2025-05-06 | End: 2025-05-08 | Stop reason: HOSPADM

## 2025-05-06 RX ADMIN — DIPHENHYDRAMINE HYDROCHLORIDE 50 MG: 50 INJECTION, SOLUTION INTRAMUSCULAR; INTRAVENOUS at 09:08

## 2025-05-06 RX ADMIN — ONDANSETRON 4 MG: 2 INJECTION INTRAMUSCULAR; INTRAVENOUS at 08:50

## 2025-05-06 RX ADMIN — SODIUM CHLORIDE, SODIUM LACTATE, POTASSIUM CHLORIDE, AND CALCIUM CHLORIDE 999 ML/HR: .6; .31; .03; .02 INJECTION, SOLUTION INTRAVENOUS at 07:45

## 2025-05-06 RX ADMIN — ACETAMINOPHEN 650 MG: 325 TABLET, FILM COATED ORAL at 17:46

## 2025-05-06 RX ADMIN — METOCLOPRAMIDE 10 MG: 5 INJECTION, SOLUTION INTRAMUSCULAR; INTRAVENOUS at 08:54

## 2025-05-06 RX ADMIN — DIPHENHYDRAMINE HYDROCHLORIDE 25 MG: 25 TABLET ORAL at 22:00

## 2025-05-06 RX ADMIN — PHENYLEPHRINE HYDROCHLORIDE 50 MCG/MIN: 10 INJECTION INTRAVENOUS at 08:21

## 2025-05-06 RX ADMIN — KETOROLAC TROMETHAMINE 15 MG: 30 INJECTION, SOLUTION INTRAMUSCULAR; INTRAVENOUS at 15:52

## 2025-05-06 RX ADMIN — Medication 250 MILLI-UNITS/MIN: at 08:37

## 2025-05-06 RX ADMIN — SODIUM CHLORIDE, SODIUM LACTATE, POTASSIUM CHLORIDE, AND CALCIUM CHLORIDE: .6; .31; .03; .02 INJECTION, SOLUTION INTRAVENOUS at 09:07

## 2025-05-06 RX ADMIN — SODIUM CHLORIDE, SODIUM LACTATE, POTASSIUM CHLORIDE, AND CALCIUM CHLORIDE 125 ML/HR: .6; .31; .03; .02 INJECTION, SOLUTION INTRAVENOUS at 11:29

## 2025-05-06 RX ADMIN — ACETAMINOPHEN 650 MG: 325 TABLET, FILM COATED ORAL at 23:48

## 2025-05-06 RX ADMIN — KETOROLAC TROMETHAMINE 15 MG: 30 INJECTION, SOLUTION INTRAMUSCULAR; INTRAVENOUS at 21:58

## 2025-05-06 RX ADMIN — KETOROLAC TROMETHAMINE 30 MG: 30 INJECTION, SOLUTION INTRAMUSCULAR; INTRAVENOUS at 09:12

## 2025-05-06 RX ADMIN — ACETAMINOPHEN 650 MG: 325 TABLET, FILM COATED ORAL at 12:15

## 2025-05-06 RX ADMIN — Medication 62.5 MILLI-UNITS/MIN: at 11:30

## 2025-05-06 RX ADMIN — DIPHENHYDRAMINE HYDROCHLORIDE 25 MG: 25 TABLET ORAL at 11:06

## 2025-05-06 RX ADMIN — SODIUM CHLORIDE, SODIUM LACTATE, POTASSIUM CHLORIDE, AND CALCIUM CHLORIDE 999 ML/HR: .6; .31; .03; .02 INJECTION, SOLUTION INTRAVENOUS at 06:34

## 2025-05-06 RX ADMIN — BUPIVACAINE HYDROCHLORIDE IN DEXTROSE 1.5 ML: 7.5 INJECTION, SOLUTION SUBARACHNOID at 08:19

## 2025-05-06 RX ADMIN — DOCUSATE SODIUM 100 MG: 100 CAPSULE, LIQUID FILLED ORAL at 17:46

## 2025-05-06 RX ADMIN — VALACYCLOVIR HYDROCHLORIDE 500 MG: 500 TABLET, FILM COATED ORAL at 12:16

## 2025-05-06 RX ADMIN — MORPHINE SULFATE 0.2 MG: 0.5 INJECTION, SOLUTION EPIDURAL; INTRATHECAL; INTRAVENOUS at 08:19

## 2025-05-06 RX ADMIN — DOCUSATE SODIUM 100 MG: 100 CAPSULE, LIQUID FILLED ORAL at 11:06

## 2025-05-06 RX ADMIN — VALACYCLOVIR HYDROCHLORIDE 500 MG: 500 TABLET, FILM COATED ORAL at 21:53

## 2025-05-06 RX ADMIN — GLYCOPYRROLATE 0.1 MCG: 0.2 INJECTION, SOLUTION INTRAMUSCULAR; INTRAVENOUS at 08:24

## 2025-05-06 RX ADMIN — CEFAZOLIN SODIUM 2000 MG: 2 SOLUTION INTRAVENOUS at 08:21

## 2025-05-06 NOTE — PLAN OF CARE
Problem: BIRTH - VAGINAL/ SECTION  Goal: Fetal and maternal status remain reassuring during the birth process  Description: INTERVENTIONS:- Monitor vital signs- Monitor fetal heart rate- Monitor uterine activity- Monitor labor progression (vaginal delivery)- DVT prophylaxis- Antibiotic prophylaxis  Outcome: Progressing  Goal: Emotionally satisfying birthing experience for mother/fetus  Description: Interventions:- Assess, plan, implement and evaluate the nursing care given to the patient in labor- Advocate the philosophy that each childbirth experience is a unique experience and support the family's chosen level of involvement and control during the labor process - Actively participate in both the patient's and family's teaching of the birth process- Consider cultural, Alevism and age-specific factors and plan care for the patient in labor  Outcome: Progressing

## 2025-05-06 NOTE — ASSESSMENT & PLAN NOTE
QBL 353cc, Hgb 12.1 --> f/u AM CBC  Monroe catheter to be removed, f/u VT  DVT ppx:Lovenox 40mg qD in AM  Continue routine post partum care  Encourage ambulation  Encourage breastfeeding  Contraception: declines  Anticipate discharge POD#2 vs #3

## 2025-05-06 NOTE — ANESTHESIA PREPROCEDURE EVALUATION
Procedure:   SECTION () REPEAT (Uterus)  LIGATION/COAGULATION TUBAL (Bilateral: Abdomen)    Relevant Problems   GYN   (+) 38 weeks gestation of pregnancy      MUSCULOSKELETAL   (+) Exercise-induced asthma        Physical Exam    Airway    Mallampati score: II  TM Distance: >3 FB  Neck ROM: full     Dental   No notable dental hx     Cardiovascular      Pulmonary      Other Findings  post-pubertal.      Anesthesia Plan  ASA Score- 2     Anesthesia Type- spinal with ASA Monitors.         Additional Monitors:     Airway Plan:            Plan Factors-    Chart reviewed.   Existing labs reviewed. Patient summary reviewed.                  Induction-     Postoperative Plan-     Perioperative Resuscitation Plan - Level 1 - Full Code.       Informed Consent- Anesthetic plan and risks discussed with patient.  I personally reviewed this patient with the CRNA. Discussed and agreed on the Anesthesia Plan with the CRNA..      NPO Status:  No vitals data found for the desired time range.

## 2025-05-06 NOTE — LACTATION NOTE
This note was copied from a baby's chart.  CONSULT - LACTATION  Baby Boy Barker (Morgan) 0 days male MRN: [id number]    [facility] NURSERY Room / Bed:  409(N)/ 409(N) Encounter: [id number]    Maternal Information     MOTHER:  Wander Barker  Maternal Age: 34 y.o.  OB History: # 1 - Date: 23, Sex: Female, Weight: 3350 g (7 lb 6.2 oz), GA: 38w5d, Type: , Low Transverse, Apgar1: 9, Apgar5: 9, Living: Living, Birth Comments: None    # 2 - Date: 25, Sex: Male, Weight: 3610 g (7 lb 15.3 oz), GA: 39w2d, Type: , Low Transverse, Apgar1: 8, Apgar5: 9, Living: Living, Birth Comments: None   Previous breast reduction surgery? No    Lactation history:   Has patient previously breast fed: Yes   How long had patient previously breast fed: 7 months   Previous breast feeding complications: Low milk supply, Other (Comment) (was unable to pump regularly when she went back to work.)     Past Surgical History:   Procedure Laterality Date    ADENOIDECTOMY      Last Assessed: 2014    DENTAL SURGERY      Last Assessed: 2014    MYRINGOTOMY      Last Assessed: 2014    UT  DELIVERY ONLY N/A 2023    Procedure:  SECTION ();  Surgeon: Monet Obrien MD;  Location: [facility];  Service: Obstetrics       Birth information:  YOB: 2024   Time of birth: 8:37 AM   Sex: male   Delivery type: , Low Transverse   Birth Weight: 3610 g (7 lb 15.3 oz)   Percent of Weight Change: 0%     Gestational Age: 39w2d   [unfilled]    Reason for Consult:    Reason for Consult: Initial assessment (routine) - 10 min, Discharge (routine) - 10 min    Risk Factors:         Breast and nipple assessment:        OB Lactation Tools:         Breast Pump:    Breast Pump  Pump: Personal (Has a Breast Pump for home.)  Pump Review/Education: Setup, frequency, and cleaning, Milk storage (Information provided.)       Assessment:  not  assessed    Feeding assessment:  not assessed   LATCH:  Latch:     Audible Swallowing:     Type of Nipple:     Comfort (Breast/Nipple):     Hold (Positioning):     LATCH Score:          Feeding recommendations:  breast feed on demand    Initial Lactation Note:     Met with Wander who is an experienced breastfeeding mom. Mom reports that baby is latching well.     [place] oral assessment and nipple assessment on mom were not done at this time as a room full of visitors were present.     The Ready Set Baby and the Discharge Breastfeeding Booklets were provided and information contained in the booklets briefly reviewed. Mom reports no questions or concerns at this time.     Encouraged Wander to call with questions and for a latch assessment as needed.         Tona Lynn RN 2025 6:03 PM

## 2025-05-06 NOTE — PROGRESS NOTES
Progress Note - OB/GYN   Name: Wander Barker 34 y.o. female I MRN: [id number]  Unit/Bed#: -01 I Date of Admission: 2025   Date of Service: 2025 I Hospital Day: 0     Assessment & Plan  Exercise-induced asthma  Controlled w/o meds  Herpes simplex type 1 infection  Valtrex suppression started at 36w  Continued PP at patient request  History of gestational hypertension  CBC, CMP and P:C WNL  Macrosomia affecting management of mother in third trimester  EFW 92%ile at 31w5d  Baby weight of 3610g (7lbs 15.3oz)  Elevated blood pressure reading  Elevated BP 150s/90s from home automated cuff on   Status post primary low transverse  section  QBL 353cc, Hgb 12.1 --> f/u AM CBC  Ramirez catheter to be removed, f/u VT  DVT ppx:Lovenox 40mg qD in AM  Continue routine post partum care  Encourage ambulation  Encourage breastfeeding  Contraception: declines  Anticipate discharge POD#2 vs #3    OB Post-Partum Progress Note  Subjective   Post delivery. Patient is doing well. Lochia WNL. Pain well controlled.     Pain: yes, cramping, improved with meds  Tolerating PO: yes  Voiding: ramirez in place  Flatus: no  BM: no  Ambulating: yes  Breastfeeding:  yes  Chest pain: no  Shortness of breath: no  Leg pain: no  Lochia: WNL    Objective :  Temp:  [97.4 °F (36.3 °C)-98.6 °F (37 °C)] 98.3 °F (36.8 °C)  HR:  [61-98] 84  BP: (101-138)/() 116/84  Resp:  [17-18] 18  SpO2:  [98 %-100 %] 100 %  O2 Device: None (Room air)    Physical Exam  Vitals and nursing note reviewed.   Constitutional:       General: She is not in acute distress.     Appearance: She is well-developed.   HENT:      Head: Normocephalic and atraumatic.   Eyes:      Conjunctiva/sclera: Conjunctivae normal.   Cardiovascular:      Rate and Rhythm: Normal rate.      Pulses: Normal pulses.   Pulmonary:      Effort: Pulmonary effort is normal. No respiratory distress.   Abdominal:      Palpations: Abdomen is soft.      Tenderness: There is no abdominal  tenderness.   Musculoskeletal:         General: No swelling.      Cervical back: Neck supple.   Skin:     General: Skin is warm and dry.      Capillary Refill: Capillary refill takes less than 2 seconds.   Neurological:      Mental Status: She is alert.   Psychiatric:         Mood and Affect: Mood normal.         Behavior: Behavior normal.         Lab Results: I have reviewed the following results:  Lab Results   Component Value Date    WBC 8.95 05/06/2025    HGB 12.2 05/06/2025    HCT 35.2 05/06/2025    MCV 92 05/06/2025     05/06/2025     Catie Olivera MD  OBGYN PGY-4  05/06/25 4:08 PM

## 2025-05-06 NOTE — ANESTHESIA POSTPROCEDURE EVALUATION
Post-Op Assessment Note    CV Status:  Stable    Pain management: adequate       Mental Status:  Alert and awake   Hydration Status:  Euvolemic   PONV Controlled:  Controlled   Airway Patency:  Patent     Post Op Vitals Reviewed: Yes    No anethesia notable event occurred.    Staff: Anesthesiologist           Last Filed PACU Vitals:  Vitals Value Taken Time   Temp 97.8 °F (36.6 °C) 05/06/25 1010   Pulse 78 05/06/25 1010   /70 05/06/25 1010   Resp 18 05/06/25 1010   SpO2 100 % 05/06/25 1010       Modified Fransico:     Vitals Value Taken Time   Activity 1 05/06/25 1010   Respiration 2 05/06/25 1010   Circulation 2 05/06/25 1010   Consciousness 2 05/06/25 1010   Oxygen Saturation 2 05/06/25 1010     Modified Frans[place] Score: 9

## 2025-05-06 NOTE — OP NOTE
OPERATIVE REPORT  PATIENT NAME: Wander Barker    :  1990  MRN: [id number]  Pt Location: [facility] L&D OR ROOM 01    SURGERY DATE: 2025    Surgeons and Role:     * Karmen Sunshine MD - Primary     * Catie Olivera MD - Assisting    Preop Diagnosis:  * No pre-op diagnosis entered *    * No Diagnosis Codes entered *    Procedure(s) (LRB):   SECTION () REPEAT (N/A)    Specimen(s):  ID Type Source Tests Collected by Time Destination   A :  Cord Blood Cord BLOOD GAS, VENOUS, CORD Karmen Sunshine MD 2025    B :  Cord Blood Cord BLOOD GAS, ARTERIAL, CORD Karmen Sunshine MD 2025    C :  Tissue (Placenta on Hold) OB Only Placenta PLACENTA IN STORAGE Karmen Sunshine MD 2025 [id number]        Surgical QBL:  Surgical QBL (mL): 353 mL      Drains:  Urethral Catheter Non-latex 16 Fr. (Active)   Number of days: 0       Anesthesia Type:   * No anesthesia type entered *    Operative Indications:  * Prior  section *     Will Group Classification System:  No Multiple pregnancy, No Transverse or oblique lie, No Breech lie, Gestational age is > or =37 weeks, Multiparous, Previous uterine scar +  is WILL GROUP 5    Maternal Findings:  Normal uterus  Normal tubes and ovaries bilaterally  Moderate fascial adhesions and minor bladder adhesions     Findings:  Viable male weighing 7lbs 15.3oz;  Apgar scores of 8 at one minute and 9 at five minutes.   Clear amniotic fluid  Normal placenta with 3-vessel cord inserted centrally    Arterial and Venous Gases:  Umbilical Cord Venous Blood Gas:  Results from last 7 days   Lab Units 25  0653   PH COV  7.274   PCO2 COV mm HG 50.5*   HCO3 COV mmol/L 22.9   BASE EXC COV mmol/L -4.4*   O2 CT CD VB mL/dL 5.5   O2 HGB, VENOUS CORD % 26.1     Umbilical Cord Arterial Blood Gas:  Results from last 7 days   Lab Units 25  0653   PH COA  7.219*   PCO2 COA  55.0   PO2 COA mm HG 17.0   HCO3 COA  mmol/L 22.0   BASE EXC COA mmol/L -6.4*   O2 CONTENT CORD ART ml/dl 6.4   O2 HGB, ARTERIAL CORD % 30.0       Complications:  None; patient tolerated the procedure well.           Disposition: PACU            Condition: stable    Brief Labor Course:   Wander is a 34 y.o.  at 39w2d presenting for her scheduled RLTCS.     Procedure Details   The patient was seen prior to the procedure. Risks, benefits, possible complications, alternate treatment options, and expected outcomes were discussed with the patient.  The patient agreed with the proposed plan and gave informed consent for a RLTCS.      The patient was taken to the OB Operating Room where she received spinal anesthesia. For infection prophylaxis, she received 2g Ancef preoperatively. Fetal heart tones in the OR were assessed and noted to be within normal limits (baseline of 130s, with decel noted just after spinal secondary to maternal hypotension) and a Monroe catheter and SCDs were placed.  The abdomen was prepped with Chloraprep, the vagina was prepped with Chlorhexadine, and following appropriate drying time, the patient was draped in the usual sterile manner.   A Time Out was held and the above information confirmed.  The patient was identified as Wander Barker and the procedure verified as a  Delivery for hx of prior  section.    A Pfannenstiel incision was made and carried down through the underlying subcutaneous tissue to the fascia using a scalpel. The rectus fascia was then nicked in the midline and dissected laterally using Mishra scissors.  The superior edge of the  fascial incision was grasped with Kocher clamps bilaterally, tented upward and the underlying rectus muscles were dissected off sharply with Mishra scissors.  This was repeated on the inferior edge of the fascia and dissected down to the pubic rami.  The rectus muscles were  and the peritoneum was identified, entered, and extended longitudinally with blunt  dissection. The bladder blade was inserted.   A low transverse uterine incision was made with the scalpel and extended laterally with blunt dissection. The amnion was entered bluntly.      The fetal head was palpated, elevated, and delivered through the uterine incision followed by the body without difficulty. There was noted to be spontaneous cry and good tone. There was no apparent injury to the . The umbilical cord was doubly clamped and cut after 30 seconds to allow for delayed cord clamping. The infant was handed off to the  providers.  Arterial and venous cord gases, cord blood, and a segment of umbilical cord were obtained for evaluation.  The placenta delivered spontaneously with uterine fundal massage and appeared normal. The uterus was exteriorized and cleaned out with a moist lap sponge.     The uterine incision was closed with a running locked suture of 0 Vicryl. A second layer of the same suture was used to imbricate the first.  Hemostasis was noted to be excellent. The posterior culdesac was inspected and cleared of all clots and debris with moist lap sponges.  The uterus was returned to the abdomen. The paracolic gutters were inspected and cleared of all clots and debris with moist lap sponges. The fascia was closed with a running suture of 0 Vicryl. Subcutaneous adipose tissue was closed with a running suture of 2-0 Plain gut. An elliptical incision of the keloid scar was made with a scalpel to remove the keloid from the incision. The skin was closed with a subcuticular running suture of 4-0 Monocryl.  Exofin and a pressure dressing were applied.      The patient appeared to tolerate the procedure very well.  Lap sponge, needle, and instrument counts were correct x2.  The patient was transferred to her postpartum recovery room in stable condition and her infant went to the  nursery.    Attending Attestation: Dr. Karmen Sunshine MD was present for the entire  procedure.    Catie Olivera MD  OB/GYN PGY-4  5/6/2025  9:52 AM

## 2025-05-06 NOTE — PLAN OF CARE
Problem: POSTPARTUM  Goal: Experiences normal postpartum course  Description: INTERVENTIONS:- Monitor maternal vital signs- Assess uterine involution and lochia  Outcome: Progressing  Goal: Appropriate maternal -  bonding  Description: INTERVENTIONS:- Identify family support- Assess for appropriate maternal/infant bonding -Encourage maternal/infant bonding opportunities- Referral to  or  as needed  Outcome: Progressing  Goal: Establishment of infant feeding pattern  Description: INTERVENTIONS:- Assess breast/bottle feeding- Refer to lactation as needed  Outcome: Progressing  Goal: Incision(s), wounds(s) or drain site(s) healing without S/S of infection  Description: INTERVENTIONS- Assess and document dressing, incision, wound bed, drain sites and surrounding tissue- Provide patient and family education.  Outcome: Progressing     Problem: PAIN - ADULT  Goal: Verbalizes/displays adequate comfort level or baseline comfort level  Description: Interventions:- Encourage patient to monitor pain and request assistance- Assess pain using appropriate pain scale- Administer analgesics based on type and severity of pain and evaluate response- Implement non-pharmacological measures as appropriate and evaluate response- Consider cultural and social influences on pain and pain management- Notify physician/advanced practitioner if interventions unsuccessful or patient reports new pain  Outcome: Progressing     Problem: INFECTION - ADULT  Goal: Absence or prevention of progression during hospitalization  Description: INTERVENTIONS:- Assess and monitor for signs and symptoms of infection- Monitor lab/diagnostic results- Monitor all insertion sites, i.e. indwelling lines, tubes, and drains- Monitor endotracheal if appropriate and nasal secretions for changes in amount and color- [place] appropriate cooling/warming therapies per order- Administer medications as ordered- Instruct and encourage patient  and family to use good hand hygiene technique- Identify and instruct in appropriate isolation precautions for identified infection/condition  Outcome: Progressing  Goal: Absence of fever/infection during neutropenic period  Description: INTERVENTIONS:- Monitor WBC  Outcome: Progressing     Problem: SAFETY ADULT  Goal: Patient will remain free of falls  Description: INTERVENTIONS:- Educate patient/family on patient safety including physical limitations- Instruct patient to call for assistance with activity - Consult OT/PT to assist with strengthening/mobility - Keep Call bell within reach- Keep bed low and locked with side rails adjusted as appropriate- Keep care items and personal belongings within reach- Initiate and maintain comfort rounds.  Outcome: Progressing  Goal: Maintain or return to baseline ADL function  Description: INTERVENTIONS:-  Assess patient's ability to carry out ADLs; assess patient's baseline for ADL function and identify physical deficits which impact ability to perform ADLs (bathing, care of mouth/teeth, toileting, grooming, dressing, etc.)- Assess/evaluate cause of self-care deficits - Assess range of motion- Assess patient's mobility; develop plan if impaired- Assess patient's need for assistive devices and provide as appropriate- Encourage maximum independence but intervene and supervise when necessary- Involve family in performance of ADLs- Assess for home care needs following discharge - Consider OT consult to assist with ADL evaluation and planning for discharge- Provide patient education as appropriate  Outcome: Progressing  Goal: Maintains/Returns to pre admission functional level  Description: INTERVENTIONS:- Perform AM-PAC 6 Click Basic Mobility/ Daily Activity assessment daily.- Set and communicate daily mobility goal to care team and patient/family/caregiver. - Collaborate with rehabilitation services on mobility goals if consulted.  Outcome: Progressing     Problem: Knowledge  Deficit  Goal: Patient/family/caregiver demonstrates understanding of disease process, treatment plan, medications, and discharge instructions  Description: Complete learning assessment and assess knowledge base.Interventions:- Provide teaching at level of understanding- Provide teaching via preferred learning methods  Outcome: Progressing     Problem: DISCHARGE PLANNING  Goal: Discharge to home or other facility with appropriate resources  Description: INTERVENTIONS:- Identify barriers to discharge w/patient and caregiver- Arrange for needed discharge resources and transportation as appropriate- Identify discharge learning needs (meds, wound care, etc.)- Arrange for interpretive services to assist at discharge as needed- Refer to Case Management Department for coordinating discharge planning if the patient needs post-hospital services based on physician/advanced practitioner order or complex needs related to functional status, cognitive ability, or social support system  Outcome: Progressing

## 2025-05-06 NOTE — H&P
H&P Exam - Obstetrics   Wander Barker 34 y.o. female MRN: [id number]  Unit/Bed#: -01 Encounter: [id number]      ASSESSMENT:  34 y.o.yo  at 39w2d weeks gestation who is being admitted for scheduled RLTCS.  EFW: 7lbs    PLAN:    Elevated blood pressure reading  Assessment & Plan  Elevated BP 150s/90s from home automated cuff on     Macrosomia affecting management of mother in third trimester  Assessment & Plan  EFW 92%ile at 31w5d    39 weeks gestation of pregnancy  Assessment & Plan  Admit to L&D  CBC, RPR, Type & Screen  SVE: deferred  FHT: cat 1  Clinical EFW: 7lbs  GBS status: negative   Postpartum contraception plan: undecided  Plan: Spinal and     History of gestational hypertension  Assessment & Plan  F/u CBC, CMP and P:C    History of  delivery, antepartum  Assessment & Plan  1 prior     Herpes simplex type 1 infection  Assessment & Plan  Valtrex suppression started at 36w    Exercise-induced asthma  Assessment & Plan  Controlled w/o meds      Discussed with Dr. Sunshine    This patient will be an INPATIENT  and I certify the anticipated length of stay is >2 Midnights.    History of Present Illness     Chief Complaint: Elective  Section, Repeat    HPI:  Wander Barker is a 34 y.o.  female with an JOSE of 2025, by Last Menstrual Period at 39w2d weeks gestation who is being admitted for a RLTCS.    Contractions: no  Loss of fluid: no  Vaginal bleeding: no  Fetal movement: yes    She is [facility] patient.     PREGNANCY COMPLICATIONS:   1) Pregnancy at 39w2d  2) Fetal macrosomia  3) Hx of prior  section  4) Hx of HSV  5) Hx of gHTN    OB History    Para Term  AB Living   2 1 1   1   SAB IAB Ectopic Multiple Live Births      0 1      # Outcome Date GA Lbr [place]/2nd Weight Sex Type Anes PTL Lv   2 Current            1 Term 23 38w5d  3350 g (7 lb 6.2 oz) F CS-LTranv Spinal  MELISSA       Baby complications/comments: EFW 92%ile at  31w5d    Review of Systems   Constitutional:  Negative for chills and fever.   HENT:  Negative for ear pain and sore throat.    Eyes:  Negative for pain and visual disturbance.   Respiratory:  Negative for cough and shortness of breath.    Cardiovascular:  Negative for chest pain and palpitations.   Gastrointestinal:  Negative for abdominal pain and vomiting.   Genitourinary:  Negative for dysuria and hematuria.   Musculoskeletal:  Negative for arthralgias and back pain.   Skin:  Negative for color change and rash.   Neurological:  Negative for seizures and syncope.   All other systems reviewed and are negative.    Historical Information   Past Medical History:   Diagnosis Date    Anemia [id number]    Asthma     Hypercholesterolemia     Last Assessed: 2014    Varicella      Past Surgical History:   Procedure Laterality Date    ADENOIDECTOMY      Last Assessed: 2014    DENTAL SURGERY      Last Assessed: 2014    MYRINGOTOMY      Last Assessed: 2014    OR  DELIVERY ONLY N/A 2023    Procedure:  SECTION ();  Surgeon: Monet Obrien MD;  Location: [facility];  Service: Obstetrics     Social History   Social History     Substance and Sexual Activity   Alcohol Use Not Currently    Alcohol/week: 1.0 - 2.0 standard drink of alcohol    Types: 1 - 2 Standard drinks or equivalent per week    Comment: rare     Social History     Substance and Sexual Activity   Drug Use Never     Social History     Tobacco Use   Smoking Status Never   Smokeless Tobacco Never     Family History: Family history non-contributory    Meds/Allergies      Medications Prior to Admission:     acetaminophen (TYLENOL) 500 mg tablet    Doxylamine Succinate, Sleep, (UNISOM PO)    loratadine (CLARITIN) 10 mg tablet    Prenatal Vit-Fe Fumarate-FA (PRENATAL VITAMINS PO)    valACYclovir (VALTREX) 500 mg tablet     Allergies   Allergen Reactions    Seasonal Ic  [Cholestatin]        OBJECTIVE:    Vitals: Blood pressure  "127/100, pulse 80, temperature 98.6 °F (37 °C), temperature source Oral, resp. rate 18, last menstrual period 08/04/2024, SpO2 100%, not currently breastfeeding.There is no height or weight on file to calculate BMI.    Physical Exam  Vitals reviewed.   Constitutional:       Appearance: Normal appearance.   HENT:      Head: Normocephalic and atraumatic.   Eyes:      Extraocular Movements: Extraocular movements intact.   Cardiovascular:      Rate and Rhythm: Normal rate.      Pulses: Normal pulses.   Pulmonary:      Effort: Pulmonary effort is normal. No respiratory distress.   Abdominal:      Palpations: Abdomen is soft.      Tenderness: There is no abdominal tenderness.      Comments: Gravid uterus   Musculoskeletal:         General: Normal range of motion.      Cervical back: Normal range of motion.   Skin:     General: Skin is warm and dry.   Neurological:      Mental Status: She is alert.   Psychiatric:         Mood and Affect: Mood normal.         Behavior: Behavior normal.       Cervix:  deferred    Fetal heart rate:    reactive    [place]:    No contractions    GBS: negative    Prenatal Labs: I have personally reviewed pertinent reports.  , Blood Type:   Lab Results   Component Value Date/Time    ABO Grouping O 04/28/2025 09:51 PM     , D (Rh type):   Lab Results   Component Value Date/Time    Rh Factor Positive 04/28/2025 09:51 PM     , Antibody Screen:   Lab Results   Component Value Date/Time    Antibody Screen Negative 04/28/2025 09:51 PM    , HCT/HGB:   Lab Results   Component Value Date/Time    Hematocrit 35.2 05/06/2025 06:27 AM    Hemoglobin 12.2 05/06/2025 06:27 AM      , MCV:   Lab Results   Component Value Date/Time    MCV 92 05/06/2025 06:27 AM      , Platelets:   Lab Results   Component Value Date/Time    Platelets 157 05/06/2025 06:27 AM      , 1 hour Glucola:   Lab Results   Component Value Date/Time    Glucose 80 02/01/2025 07:24 AM   , 3 hour GTT: No results found for: \"JZYQABA3YG\", Varicella: No " "results found for: \"VARICELLAIGG\"    , Rubella: No results found for: \"RUBELLAIGGQT\"     , Syphilis: No results found for: \"SYPHILISAB\"   , Urine Culture/Screen: No results found for: \"URINECX\"    , Hep B: No results found for: \"HEPBSAG\"  , Hep C:   Lab Results   Component Value Date/Time    HEP C AB NON-REACTIVE 10/09/2024 07:08 AM      , HIV: No results found for: \"MXT8Y96GE\"  , Chlamydia:   Lab Results   Component Value Date/Time    Chlamydia trachomatis, DNA Probe Negative 11/04/2024 02:01 PM     , Gonorrhea:   Lab Results   Component Value Date/Time    N gonorrhoeae, DNA Probe Negative 11/04/2024 02:01 PM     , Group B Strep:    Lab Results   Component Value Date/Time    Strep Grp B PCR Negative 04/10/2025 11:34 AM          Invasive Devices       None                 Catie Olivera MD  OBGYN PGY-4  5/6/2025  6:29 AM  "

## 2025-05-06 NOTE — ASSESSMENT & PLAN NOTE
Admit to L&D  CBC, RPR, Type & Screen  SVE: deferred  FHT: cat 1  Clinical EFW: 7lbs  GBS status: negative   Postpartum contraception plan: undecided  Plan: Spinal and

## 2025-05-07 LAB
ERYTHROCYTE [DISTWIDTH] IN BLOOD BY AUTOMATED COUNT: 12.6 % (ref 11.6–15.1)
HCT VFR BLD AUTO: 30.9 % (ref 34.8–46.1)
HGB BLD-MCNC: 10.5 G/DL (ref 11.5–15.4)
MCH RBC QN AUTO: 32 PG (ref 26.8–34.3)
MCHC RBC AUTO-ENTMCNC: 34 G/DL (ref 31.4–37.4)
MCV RBC AUTO: 94 FL (ref 82–98)
PLATELET # BLD AUTO: 146 THOUSANDS/UL (ref 149–390)
PMV BLD AUTO: 11.4 FL (ref 8.9–12.7)
RBC # BLD AUTO: 3.28 MILLION/UL (ref 3.81–5.12)
WBC # BLD AUTO: 9.15 THOUSAND/UL (ref 4.31–10.16)

## 2025-05-07 PROCEDURE — 94762 N-INVAS EAR/PLS OXIMTRY CONT: CPT

## 2025-05-07 PROCEDURE — 85027 COMPLETE CBC AUTOMATED: CPT

## 2025-05-07 PROCEDURE — 99024 POSTOP FOLLOW-UP VISIT: CPT | Performed by: OBSTETRICS & GYNECOLOGY

## 2025-05-07 RX ADMIN — ACETAMINOPHEN 650 MG: 325 TABLET, FILM COATED ORAL at 18:26

## 2025-05-07 RX ADMIN — KETOROLAC TROMETHAMINE 15 MG: 30 INJECTION, SOLUTION INTRAMUSCULAR; INTRAVENOUS at 10:41

## 2025-05-07 RX ADMIN — VALACYCLOVIR HYDROCHLORIDE 500 MG: 500 TABLET, FILM COATED ORAL at 21:43

## 2025-05-07 RX ADMIN — IBUPROFEN 600 MG: 600 TABLET ORAL at 17:40

## 2025-05-07 RX ADMIN — DOCUSATE SODIUM 100 MG: 100 CAPSULE, LIQUID FILLED ORAL at 08:04

## 2025-05-07 RX ADMIN — ACETAMINOPHEN 650 MG: 325 TABLET, FILM COATED ORAL at 12:30

## 2025-05-07 RX ADMIN — KETOROLAC TROMETHAMINE 15 MG: 30 INJECTION, SOLUTION INTRAMUSCULAR; INTRAVENOUS at 04:02

## 2025-05-07 RX ADMIN — ACETAMINOPHEN 650 MG: 325 TABLET, FILM COATED ORAL at 05:31

## 2025-05-07 RX ADMIN — ENOXAPARIN SODIUM 40 MG: 40 INJECTION SUBCUTANEOUS at 08:04

## 2025-05-07 RX ADMIN — DOCUSATE SODIUM 100 MG: 100 CAPSULE, LIQUID FILLED ORAL at 17:40

## 2025-05-07 RX ADMIN — VALACYCLOVIR HYDROCHLORIDE 500 MG: 500 TABLET, FILM COATED ORAL at 08:04

## 2025-05-07 NOTE — LACTATION NOTE
This note was copied from a baby's chart.  Wander called for assistance with feeding, baby is sleepy post circ.    Worked on positioning infant skin to skin, up at chest level and starting to feed infant with nose arriving at the nipple. Then, using areolar compression to achieve a deep latch that is comfortable and exchanges optimum amounts of milk. I offered suggestions on positioning, for a more optimal latch, showed mom proper positioning, ear, shoulder hip in line, baby's arms open, not in between mom and baby, nose to nipple, hand at base of head/neck.    Baby latches only briefly, sucks a few times and comes off, he fusses a bit and falls back to sleepy, this repeats after each attempt, attempted in cross cradle on both breasts per mom's preference.    Assisted to hand express several large drops of colostrum to baby's mouth, demonstrated and encouraged use of hand pump at this time, Judy RN to follow up after pumping to assist with feeding expressed colostrum.    Encouraged mom to offer breast again at next feeding, if not latching pump/hand express and feed expressed breast milk.    Wander verbalizes understanding and declines questions at this time.

## 2025-05-07 NOTE — PLAN OF CARE
Problem: PAIN - ADULT  Goal: Verbalizes/displays adequate comfort level or baseline comfort level  Description: Interventions:- Encourage patient to monitor pain and request assistance- Assess pain using appropriate pain scale- Administer analgesics based on type and severity of pain and evaluate response- Implement non-pharmacological measures as appropriate and evaluate response- Consider cultural and social influences on pain and pain management- Notify physician/advanced practitioner if interventions unsuccessful or patient reports new pain  Outcome: Progressing     Problem: INFECTION - ADULT  Goal: Absence or prevention of progression during hospitalization  Description: INTERVENTIONS:- Assess and monitor for signs and symptoms of infection- Monitor lab/diagnostic results- Monitor all insertion sites, i.e. indwelling lines, tubes, and drains- Monitor endotracheal if appropriate and nasal secretions for changes in amount and color- [place] appropriate cooling/warming therapies per order- Administer medications as ordered- Instruct and encourage patient and family to use good hand hygiene technique- Identify and instruct in appropriate isolation precautions for identified infection/condition  Outcome: Progressing  Goal: Absence of fever/infection during neutropenic period  Description: INTERVENTIONS:- Monitor WBC  Outcome: Progressing     Problem: SAFETY ADULT  Goal: Patient will remain free of falls  Description: INTERVENTIONS:- Educate patient/family on patient safety including physical limitations- Instruct patient to call for assistance with activity - Consult OT/PT to assist with strengthening/mobility - Keep Call bell within reach- Keep bed low and locked with side rails adjusted as appropriate- Keep care items and personal belongings within reach- Initiate and maintain comfort rounds.  Outcome: Progressing  Goal: Maintain or return to baseline ADL function  Description: INTERVENTIONS:-  Assess  patient's ability to carry out ADLs; assess patient's baseline for ADL function and identify physical deficits which impact ability to perform ADLs (bathing, care of mouth/teeth, toileting, grooming, dressing, etc.)- Assess/evaluate cause of self-care deficits - Assess range of motion- Assess patient's mobility; develop plan if impaired- Assess patient's need for assistive devices and provide as appropriate- Encourage maximum independence but intervene and supervise when necessary- Involve family in performance of ADLs- Assess for home care needs following discharge - Consider OT consult to assist with ADL evaluation and planning for discharge- Provide patient education as appropriate  Outcome: Progressing  Goal: Maintains/Returns to pre admission functional level  Description: INTERVENTIONS:- Perform AM-PAC 6 Click Basic Mobility/ Daily Activity assessment daily.- Set and communicate daily mobility goal to care team and patient/family/caregiver. - Collaborate with rehabilitation services on mobility goals if consulted.  Outcome: Progressing     Problem: Knowledge Deficit  Goal: Patient/family/caregiver demonstrates understanding of disease process, treatment plan, medications, and discharge instructions  Description: Complete learning assessment and assess knowledge base.Interventions:- Provide teaching at level of understanding- Provide teaching via preferred learning methods  Outcome: Progressing     Problem: DISCHARGE PLANNING  Goal: Discharge to home or other facility with appropriate resources  Description: INTERVENTIONS:- Identify barriers to discharge w/patient and caregiver- Arrange for needed discharge resources and transportation as appropriate- Identify discharge learning needs (meds, wound care, etc.)- Arrange for interpretive services to assist at discharge as needed- Refer to Case Management Department for coordinating discharge planning if the patient needs post-hospital services based on  physician/advanced practitioner order or complex needs related to functional status, cognitive ability, or social support system  Outcome: Progressing     Problem: POSTPARTUM  Goal: Experiences normal postpartum course  Description: INTERVENTIONS:- Monitor maternal vital signs- Assess uterine involution and lochia  Outcome: Progressing  Goal: Appropriate maternal -  bonding  Description: INTERVENTIONS:- Identify family support- Assess for appropriate maternal/infant bonding -Encourage maternal/infant bonding opportunities- Referral to  or  as needed  Outcome: Progressing  Goal: Establishment of infant feeding pattern  Description: INTERVENTIONS:- Assess breast/bottle feeding- Refer to lactation as needed  Outcome: Progressing  Goal: Incision(s), wounds(s) or drain site(s) healing without S/S of infection  Description: INTERVENTIONS- Assess and document dressing, incision, wound bed, drain sites and surrounding tissue- Provide patient and family education.  Outcome: Progressing

## 2025-05-07 NOTE — PROGRESS NOTES
Progress Note - OB/GYN   Name: Wander Barker 34 y.o. female I MRN: [id number]  Unit/Bed#: -01 I Date of Admission: 2025   Date of Service: 2025 I Hospital Day: 1     Assessment & Plan  Exercise-induced asthma  Controlled w/o meds  Herpes simplex type 1 infection  Valtrex suppression started at 36w  Continued PP at patient request  History of gestational hypertension  CBC, CMP and P:C WNL  Macrosomia affecting management of mother in third trimester  EFW 92%ile at 31w5d  Baby weight of 3610g (7lbs 15.3oz)  Elevated blood pressure reading  Elevated BP 150s/90s from home automated cuff on   Status post primary low transverse  section  QBL 353cc, Hgb 12.1 --> 10.5  [place] catheter to be removed, passed VT  DVT ppx:Lovenox 40mg qD in AM  Continue routine post partum care  Encourage ambulation  Encourage breastfeeding  Contraception: declines  Anticipate discharge POD#2 vs #3    OB Post-Partum Progress Note  Subjective   Post delivery. Patient is doing well. Lochia WNL. Pain well controlled.     Pain: yes, cramping, improved with meds  Tolerating PO: yes  Voiding: yes  Flatus: yes  BM: no  Ambulating: yes  Breastfeeding:  yes  Chest pain: no  Shortness of breath: no  Leg pain: no  Lochia: WNL    Objective :  Temp:  [97.2 °F (36.2 °C)-98.5 °F (36.9 °C)] 97.2 °F (36.2 °C)  HR:  [61-98] 78  BP: (101-149)/() 120/88  Resp:  [17-18] 18  SpO2:  [98 %-100 %] 99 %  O2 Device: None (Room air)    Physical Exam  Vitals and nursing note reviewed.   Constitutional:       General: She is not in acute distress.     Appearance: She is well-developed.   HENT:      Head: Normocephalic and atraumatic.   Eyes:      Conjunctiva/sclera: Conjunctivae normal.   Cardiovascular:      Rate and Rhythm: Normal rate.      Pulses: Normal pulses.   Pulmonary:      Effort: Pulmonary effort is normal. No respiratory distress.   Abdominal:      Palpations: Abdomen is soft.      Tenderness: There is no abdominal tenderness.       Comments: Fundus is firm and 1cm below the umbilicus   Musculoskeletal:         General: No swelling.      Cervical back: Neck supple.   Skin:     General: Skin is warm and dry.      Capillary Refill: Capillary refill takes less than 2 seconds.   Neurological:      Mental Status: She is alert.   Psychiatric:         Mood and Affect: Mood normal.         Behavior: Behavior normal.           Lab Results: I have reviewed the following results:  Lab Results   Component Value Date    WBC 9.15 05/07/2025    HGB 10.5 (L) 05/07/2025    HCT 30.9 (L) 05/07/2025    MCV 94 05/07/2025     (L) 05/07/2025   Catie Olivera MD  OBGYN PGY-4  05/07/25 7:11 AM

## 2025-05-07 NOTE — LACTATION NOTE
This note was copied from a baby's chart.  Mom states that breastfeeding is going well, she reports regular feedings with comfortable latch with the exception of some latch on tenderness that resolves after initial latch on.  She state baby latches well and appears content after feeding.    She states baby was circumcised this morning and is sleepy, encouraged to continue to offer the breast, spend lots of time skin to skin, if latch not achieved, encouraged to hand express and feed baby her expressed colostrum.     Encouraged to feed on demand, at least q3hrs around the clock, working on optimal latch and positioning.  Encouraged to offer both breast at each feeding, offering up to 4 breasts per feeding as needed.  Encouraged to offer breast compressions as needed through out the feeding, demonstrated how to do so and discussed when.    Encouraged family to call for latch check and assistance as needed.

## 2025-05-07 NOTE — ASSESSMENT & PLAN NOTE
QBL 353cc, Hgb 12.1 --> 10.5  [place] catheter to be removed, passed VT  DVT ppx:Lovenox 40mg qD in AM  Continue routine post partum care  Encourage ambulation  Encourage breastfeeding  Contraception: declines  Anticipate discharge POD#2 vs #3

## 2025-05-08 VITALS
SYSTOLIC BLOOD PRESSURE: 133 MMHG | HEART RATE: 74 BPM | RESPIRATION RATE: 18 BRPM | OXYGEN SATURATION: 98 % | BODY MASS INDEX: 30.45 KG/M2 | TEMPERATURE: 97.6 F | HEIGHT: 65 IN | DIASTOLIC BLOOD PRESSURE: 88 MMHG

## 2025-05-08 PROCEDURE — 99024 POSTOP FOLLOW-UP VISIT: CPT | Performed by: OBSTETRICS & GYNECOLOGY

## 2025-05-08 PROCEDURE — NC001 PR NO CHARGE: Performed by: OBSTETRICS & GYNECOLOGY

## 2025-05-08 RX ORDER — CALCIUM CARBONATE 500 MG/1
1000 TABLET, CHEWABLE ORAL DAILY PRN
Start: 2025-05-08

## 2025-05-08 RX ORDER — IBUPROFEN 600 MG/1
600 TABLET, FILM COATED ORAL EVERY 6 HOURS
Qty: 30 TABLET | Refills: 0 | Status: SHIPPED | OUTPATIENT
Start: 2025-05-08

## 2025-05-08 RX ORDER — OXYCODONE HYDROCHLORIDE 5 MG/1
5 TABLET ORAL EVERY 4 HOURS PRN
Qty: 4 TABLET | Refills: 0 | Status: SHIPPED | OUTPATIENT
Start: 2025-05-08 | End: 2025-05-18

## 2025-05-08 RX ADMIN — ACETAMINOPHEN 650 MG: 325 TABLET, FILM COATED ORAL at 06:22

## 2025-05-08 RX ADMIN — OXYCODONE 5 MG: 5 TABLET ORAL at 00:36

## 2025-05-08 RX ADMIN — IBUPROFEN 600 MG: 600 TABLET ORAL at 00:07

## 2025-05-08 RX ADMIN — VALACYCLOVIR HYDROCHLORIDE 500 MG: 500 TABLET, FILM COATED ORAL at 08:17

## 2025-05-08 RX ADMIN — IBUPROFEN 600 MG: 600 TABLET ORAL at 06:22

## 2025-05-08 RX ADMIN — ACETAMINOPHEN 650 MG: 325 TABLET, FILM COATED ORAL at 00:07

## 2025-05-08 RX ADMIN — ENOXAPARIN SODIUM 40 MG: 40 INJECTION SUBCUTANEOUS at 08:17

## 2025-05-08 RX ADMIN — DOCUSATE SODIUM 100 MG: 100 CAPSULE, LIQUID FILLED ORAL at 08:17

## 2025-05-08 NOTE — PLAN OF CARE
Problem: PAIN - ADULT  Goal: Verbalizes/displays adequate comfort level or baseline comfort level  Description: Interventions:- Encourage patient to monitor pain and request assistance- Assess pain using appropriate pain scale- Administer analgesics based on type and severity of pain and evaluate response- Implement non-pharmacological measures as appropriate and evaluate response- Consider cultural and social influences on pain and pain management- Notify physician/advanced practitioner if interventions unsuccessful or patient reports new pain  Outcome: Progressing     Problem: INFECTION - ADULT  Goal: Absence or prevention of progression during hospitalization  Description: INTERVENTIONS:- Assess and monitor for signs and symptoms of infection- Monitor lab/diagnostic results- Monitor all insertion sites, i.e. indwelling lines, tubes, and drains- Monitor endotracheal if appropriate and nasal secretions for changes in amount and color- [place] appropriate cooling/warming therapies per order- Administer medications as ordered- Instruct and encourage patient and family to use good hand hygiene technique- Identify and instruct in appropriate isolation precautions for identified infection/condition  Outcome: Progressing  Goal: Absence of fever/infection during neutropenic period  Description: INTERVENTIONS:- Monitor WBC  Outcome: Progressing     Problem: SAFETY ADULT  Goal: Patient will remain free of falls  Description: INTERVENTIONS:- Educate patient/family on patient safety including physical limitations- Instruct patient to call for assistance with activity - Consult OT/PT to assist with strengthening/mobility - Keep Call bell within reach- Keep bed low and locked with side rails adjusted as appropriate- Keep care items and personal belongings within reach- Initiate and maintain comfort rounds.  Outcome: Progressing  Goal: Maintain or return to baseline ADL function  Description: INTERVENTIONS:-  Assess  patient's ability to carry out ADLs; assess patient's baseline for ADL function and identify physical deficits which impact ability to perform ADLs (bathing, care of mouth/teeth, toileting, grooming, dressing, etc.)- Assess/evaluate cause of self-care deficits - Assess range of motion- Assess patient's mobility; develop plan if impaired- Assess patient's need for assistive devices and provide as appropriate- Encourage maximum independence but intervene and supervise when necessary- Involve family in performance of ADLs- Assess for home care needs following discharge - Consider OT consult to assist with ADL evaluation and planning for discharge- Provide patient education as appropriate  Outcome: Progressing  Goal: Maintains/Returns to pre admission functional level  Description: INTERVENTIONS:- Perform AM-PAC 6 Click Basic Mobility/ Daily Activity assessment daily.- Set and communicate daily mobility goal to care team and patient/family/caregiver. - Collaborate with rehabilitation services on mobility goals if consulted.  Outcome: Progressing     Problem: Knowledge Deficit  Goal: Patient/family/caregiver demonstrates understanding of disease process, treatment plan, medications, and discharge instructions  Description: Complete learning assessment and assess knowledge base.Interventions:- Provide teaching at level of understanding- Provide teaching via preferred learning methods  Outcome: Progressing     Problem: DISCHARGE PLANNING  Goal: Discharge to home or other facility with appropriate resources  Description: INTERVENTIONS:- Identify barriers to discharge w/patient and caregiver- Arrange for needed discharge resources and transportation as appropriate- Identify discharge learning needs (meds, wound care, etc.)- Arrange for interpretive services to assist at discharge as needed- Refer to Case Management Department for coordinating discharge planning if the patient needs post-hospital services based on  physician/advanced practitioner order or complex needs related to functional status, cognitive ability, or social support system  Outcome: Progressing     Problem: POSTPARTUM  Goal: Experiences normal postpartum course  Description: INTERVENTIONS:- Monitor maternal vital signs- Assess uterine involution and lochia  Outcome: Progressing  Goal: Appropriate maternal -  bonding  Description: INTERVENTIONS:- Identify family support- Assess for appropriate maternal/infant bonding -Encourage maternal/infant bonding opportunities- Referral to  or  as needed  Outcome: Progressing  Goal: Establishment of infant feeding pattern  Description: INTERVENTIONS:- Assess breast/bottle feeding- Refer to lactation as needed  Outcome: Progressing  Goal: Incision(s), wounds(s) or drain site(s) healing without S/S of infection  Description: INTERVENTIONS- Assess and document dressing, incision, wound bed, drain sites and surrounding tissue- Provide patient and family education.  Outcome: Progressing

## 2025-05-08 NOTE — NURSING NOTE
Discharge teaching was completed with patient and spouse.  All contents of the discharge packet were reviewed.  The SAVE YOUR LIFE card POST BIRTH warning signs were discussed in detail on when to call 911 and when to call the provider.  Both patient and spouse acknowledged understanding.  Patient and spouses questions were answered and learnings acknowledged.

## 2025-05-08 NOTE — PROGRESS NOTES
Progress Note - OB/GYN   Name: Wander Barker 34 y.o. female I MRN: [id number]  Unit/Bed#: -01 I Date of Admission: 2025   Date of Service: 2025 I Hospital Day: 2     Assessment & Plan  Exercise-induced asthma  Controlled w/o meds  Herpes simplex type 1 infection  Valtrex suppression started at 36w  Continued PP at patient request  History of gestational hypertension  CBC, CMP and P:C WNL  Macrosomia affecting management of mother in third trimester  EFW 92%ile at 31w5d  Baby weight of 3610g (7lbs 15.3oz)  Elevated blood pressure reading  Elevated BP 150s/90s from home automated cuff on   Status post primary low transverse  section  QBL 353cc, Hgb 12.1 --> 10.5  [place] catheter to be removed, passed VT  DVT ppx:Lovenox 40mg qD in AM  Continue routine post partum care  Encourage ambulation  Encourage breastfeeding  Contraception: declines  Anticipate discharge POD#2    OB Post-Partum Progress Note  Subjective   Post delivery. Patient is doing well. Lochia WNL. Pain well controlled.     Pain: yes, cramping, improved with meds  Tolerating PO: yes  Voiding: yes  Flatus: yes  BM: yes  Ambulating: yes  Breastfeeding: yes  Chest pain: no  Shortness of breath: no  Leg pain: no  Lochia: WNL    Objective :  Temp:  [97.7 °F (36.5 °C)-98.3 °F (36.8 °C)] 98.1 °F (36.7 °C)  HR:  [62-75] 75  BP: (122-140)/(85-96) 139/88  Resp:  [16-18] 18  SpO2:  [96 %-99 %] 98 %  O2 Device: None (Room air)    Physical Exam  Vitals and nursing note reviewed.   Constitutional:       General: She is not in acute distress.     Appearance: She is well-developed.   HENT:      Head: Normocephalic and atraumatic.   Eyes:      Conjunctiva/sclera: Conjunctivae normal.   Cardiovascular:      Rate and Rhythm: Normal rate.      Pulses: Normal pulses.   Pulmonary:      Effort: Pulmonary effort is normal. No respiratory distress.   Abdominal:      Palpations: Abdomen is soft.      Tenderness: There is no abdominal tenderness.       Comments: Fundus is firm and 1cm below the umbilicus  Pfannenstiel incisions is clean dry and intact.   Musculoskeletal:         General: No swelling.      Cervical back: Neck supple.   Skin:     General: Skin is warm and dry.      Capillary Refill: Capillary refill takes less than 2 seconds.   Neurological:      Mental Status: She is alert.   Psychiatric:         Mood and Affect: Mood normal.         Behavior: Behavior normal.           Lab Results: I have reviewed the following results:  Lab Results   Component Value Date    WBC 9.15 05/07/2025    HGB 10.5 (L) 05/07/2025    HCT 30.9 (L) 05/07/2025    MCV 94 05/07/2025     (L) 05/07/2025     Catie Olivera MD  OBGYN PGY-4  05/08/25 6:28 AM

## 2025-05-08 NOTE — LACTATION NOTE
This note was copied from a baby's chart.  CONSULT - LACTATION  Baby Boy Barker (Morgan) 2 days male MRN: [id number]    [facility] NURSERY Room / Bed: (N)/(N) Encounter: [id number]    Maternal Information     MOTHER:  Wander Barker  Maternal Age: 34 y.o.  OB History: # 1 - Date: 23, Sex: Female, Weight: 3350 g (7 lb 6.2 oz), GA: 38w5d, Type: , Low Transverse, Apgar1: 9, Apgar5: 9, Living: Living, Birth Comments: None    # 2 - Date: 25, Sex: Male, Weight: 3610 g (7 lb 15.3 oz), GA: 39w2d, Type: , Low Transverse, Apgar1: 8, Apgar5: 9, Living: Living, Birth Comments: None   Previous breast reduction surgery? No    Lactation history:   Has patient previously breast fed: Yes   How long had patient previously breast fed: 7 months   Previous breast feeding complications: Other (Comment), Low milk supply (Supply dipped when started back to work)     Past Surgical History:   Procedure Laterality Date    ADENOIDECTOMY      Last Assessed: 2014    DENTAL SURGERY      Last Assessed: 2014    MYRINGOTOMY      Last Assessed: 2014    [place]  DELIVERY ONLY N/A 2023    Procedure:  SECTION ();  Surgeon: Monet Obrien MD;  Location: [facility];  Service: Obstetrics    [place]  DELIVERY ONLY N/A 2025    Procedure:  SECTION () REPEAT;  Surgeon: Karmen Sunshine MD;  Location: [facility];  Service: Obstetrics       Birth information:  YOB: 2024   Time of birth: 8:37 AM   Sex: male   Delivery type: , Low Transverse   Birth Weight: 3610 g (7 lb 15.3 oz)   Percent of Weight Change: -9%     Gestational Age: 39w2d   [unfilled]    Reason for Consult:    Reason for Consult: Discharge (ext) - 20 min    Risk Factors:         Breast and nipple assessment:   Breasts/Nipples  Left Breast: Soft, Filling  Right Breast: Soft, Filling  Left Nipple: Everted  Right Nipple:  Everted  Intervention: Breast pump  Breastfeeding Progress: Not yet established    OB Lactation Tools:         Breast Pump:    Breast Pump  Pump: Manual  Pump Review/Education: Setup, frequency, and cleaning, Milk storage  Initiated by: Lactation  Date Initiated: 25      [place] Assessment:  Sleeping on mother's chest after feed, showing fullness cues.    Feeding assessment:  Family feels as baby is starting to progressively get better at the breast.    Feeding recommendations:  breast feed on demand every 2-3 hours, watching for early feeding cues. At least 8-12 feedings in 24 hours. Use manual breast pump after breastfeeding to top off with breast milk to prevent excessive weight loss.     Consulted with Wander to follow up and discuss discharge breastfeeding anticipation guidelines.  Baby at 8.5% weight loss, baby's stools have not started to transition in color at 49 hours.     Wander reports that she feels as baby is started to progressively get better at the breast today. Patient reports baby had good feeds throughout the night and this morning. Patient feels as her milk is transitioning. Baby was sleeping on mother's chest displaying fullness cues after feed, mother tried to offer the second breast but baby was satiated from the first.    Wander reports she wants to start using her electric breast pump today and introducing bottles so she can start collecting breast milk and building a stash.  Educated patient that baby is more efficient and effective at removing milk at the breast. Reiterated that pumping colostrum is not a good indicator of how much the baby is feeding from the breast or of milk supply.   Discussed avoiding/limiting use of bottles until breastfeeding well established, adequate milk supply and baby is back to birth weight unless medically indicated that supplementation is necessary.  Family had questions about supplementation. Reviewed medical indications when supplementation would be  necessary.  Reviewed appropriate measures and timeline of introducing use of breast pump.    Discussed using stimulation techniques and breast compression when baby is at the breast to keep active sucking prolonged. Reviewed if baby does not respond to that, then to switch to the next breast. Reviewed to offer both breasts during a feed. Starting on the breast baby last fed off of if baby takes both and alternating if baby takes the one.     Discussed how to know the baby is feeding adequately at the breast:  -Baby effectively feeding at least 8-12 times in 24 hours.  -Mother feels a comfortable tugging sensation during a feeding.  -Baby is showing fullness cues, post feed.  -Baby is having adequate amounts of diapers and meeting goal diapers.  -Baby's stool is changing from black meconium, to greenish brown to yellow and seedy looking over the first week when exclusively providing breast milk.   -Baby's weight loss is within normal ranges.    Feeding plan:  Patient is encouraged to breastfeed on demand, every 2-3 hours or when baby showing early feeding cues. Reviewed to offer both breasts during a feed.  Discussed the importance of ensuring that baby feeds 8-12x in 24 hours and not waiting over 3 hours to feed. Educated to watch the baby for hunger and fullness cues.  Discussed using manual breast pump after breastfeeding to top baby off with breast milk.     Encouraged family to monitor baby's outputs, ensuring baby is reaching goal diapers. Discussed that soiled diapers transition by changing color from black meconium to yellow/seedy by day 5.    Discussed initial engorgement time frame and reviewed engorgement comfort measures.    Discussed community resources for continued breastfeeding support once discharged home. Encouraged to contact with [facility] as needed.  Family has a pediatrician appointment tomorrow morning to follow up on baby's weight loss.    Patient was encouraged to contact  lactation for a latch assessment, continued support and/or questions that arise before discharge.    LUDY Thomas  5/8/2025 9:44 AM

## 2025-05-08 NOTE — DISCHARGE INSTR - AVS FIRST PAGE
Gestational Hypertension:    Please monitor your blood pressure twice daily following discharge to home and write them all down to bring to your doctor appointment.  Call the office for any top numbers over 150 or bottom numbers greater than 100.  Please call if you have a headache unresolved with medication, vision changes, right side abdominal pain or increased swelling.  Call and make an appointment to see your doctor within 1 week of delivery to check your blood pressures.

## 2025-05-08 NOTE — ASSESSMENT & PLAN NOTE
QBL 353cc, Hgb 12.1 --> 10.5  [place] catheter to be removed, passed VT  DVT ppx:Lovenox 40mg qD in AM  Continue routine post partum care  Encourage ambulation  Encourage breastfeeding  Contraception: declines  Anticipate discharge POD#2

## 2025-05-08 NOTE — DISCHARGE SUMMARY
Discharge Summary - OB/GYN   Name: Wander Barker 34 y.o. female I MRN: [id number]  Unit/Bed#: -01 I Date of Admission: 2025   Date of Service: 2025 I Hospital Day: 2    ADMISSION  Patient of: [facility]  Admission Date: 2025   Admitting Attending: Dr. Sunshine  Admitting Diagnoses:   Patient Active Problem List   Diagnosis    Exercise-induced asthma    Herpes simplex type 1 infection    History of  delivery, antepartum    Adjustment disorder with anxiety    History of gestational hypertension    39 weeks gestation of pregnancy    COVID-19 affecting pregnancy in first trimester    Macrosomia affecting management of mother in third trimester    Elevated blood pressure reading    Status post primary low transverse  section       DELIVERY  Delivery Method: , Low Transverse   Delivery Date and Time: 2025 8:37 AM  Delivery Attending: Dr. Sunshine    DISCHARGE  Discharge Date: 25  Discharge Attending: Dr. Stockton  Discharge Diagnosis:   Same, Delivered  Clinical course: Admission to Delivery  Wander Barker is a 34 y.o.  who was admitted at 39w2d for her scheduled RLTCS. .  Delivery  Route of Delivery: , Low Transverse  Reason for  delivery: Prior       Anesthesia: Spinal,   QBL: 353cc    Delivery: , Low Transverse at 2025 8:37 AM    Baby's Weight: 3610 g (7 lb 15.3 oz); 127.34    Apgar scores: 8  and 9  at 1 and 5 minutes, respectively    Clinical Course: Post-Delivery:  The post delivery course was complicated by GHTN not requiring antihypertensive therapy.     On the day of discharge, the patient was ambulating, voiding spontaneously, tolerating oral intake, and hemodynamically stable. She was able to reasonably perform all ADLs. She had appropriate bowel function. Pain was well-controlled. She was discharged home on postpartum/postop day #2 without complications. Patient was instructed to follow up with  her OB as an outpatient and was given appropriate warnings to call her provider with problems or concerns.    Pertinent lab findings included:   Blood type O+.     Last three Hgb values:  Lab Results   Component Value Date    HGB 10.5 (L) 2025    HGB 12.2 2025    HGB 12.5 2025        Problem-specific follow-up plans included the following:  Assessment & Plan  Exercise-induced asthma  Controlled w/o meds  Herpes simplex type 1 infection  Valtrex suppression started at 36w  Continued PP at patient request  History of gestational hypertension  CBC, CMP and P:C WNL  Macrosomia affecting management of mother in third trimester  EFW 92%ile at 31w5d  Baby weight of 3610g (7lbs 15.3oz)  Elevated blood pressure reading  Elevated BP 150s/90s from home automated cuff on   Status post primary low transverse  section  QBL 353cc, Hgb 12.1 --> 10.5  [place] catheter to be removed, passed VT  DVT ppx:Lovenox 40mg qD in AM  Continue routine post partum care  Encourage ambulation  Encourage breastfeeding  Contraception: declines  Anticipate discharge POD#2      Discharge med list:  Contraception: declined     Medication List      START taking these medications     calcium carbonate 500 mg chewable tablet; Commonly known as: TUMS; Chew   2 tablets (1,000 mg total) daily as needed for indigestion or heartburn   ibuprofen 600 mg tablet; Commonly known as: MOTRIN; Take 1 tablet (600   mg total) by mouth every 6 (six) hours   oxyCODONE 5 immediate release tablet; Commonly known as: Roxicodone;   Take 1 tablet (5 mg total) by mouth every 4 (four) hours as needed for   severe pain for up to 10 days Max Daily Amount: 30 mg     CONTINUE taking these medications     acetaminophen 500 mg tablet; Commonly known as: TYLENOL   PRENATAL VITAMINS PO   valACYclovir 500 mg tablet; Commonly known as: VALTREX; Take 1 tablet   (500 mg total) by mouth 2 (two) times a day     STOP taking these medications     loratadine 10 mg  tablet; Commonly known as: CLARITIN   UNISOM PO       Condition at discharge:   good     Disposition:   See After Visit Summary for discharge disposition information.    Planned Readmission:   No    Catie Halle Olivera MD  OBGYN PGY-4  05/08/25 3:50 PM    I saw and examined Wander after Dr. Olivera on 5/8/2025. I agree with her documentation.     Neema Stockton MD  OB/GYN  5/8/2025 4:10 PM

## 2025-05-08 NOTE — PLAN OF CARE
Problem: PAIN - ADULT  Goal: Verbalizes/displays adequate comfort level or baseline comfort level  Description: Interventions:- Encourage patient to monitor pain and request assistance- Assess pain using appropriate pain scale- Administer analgesics based on type and severity of pain and evaluate response- Implement non-pharmacological measures as appropriate and evaluate response- Consider cultural and social influences on pain and pain management- Notify physician/advanced practitioner if interventions unsuccessful or patient reports new pain  Outcome: Completed     Problem: INFECTION - ADULT  Goal: Absence or prevention of progression during hospitalization  Description: INTERVENTIONS:- Assess and monitor for signs and symptoms of infection- Monitor lab/diagnostic results- Monitor all insertion sites, i.e. indwelling lines, tubes, and drains- Monitor endotracheal if appropriate and nasal secretions for changes in amount and color- [place] appropriate cooling/warming therapies per order- Administer medications as ordered- Instruct and encourage patient and family to use good hand hygiene technique- Identify and instruct in appropriate isolation precautions for identified infection/condition  Outcome: Completed  Goal: Absence of fever/infection during neutropenic period  Description: INTERVENTIONS:- Monitor WBC  Outcome: Completed     Problem: SAFETY ADULT  Goal: Patient will remain free of falls  Description: INTERVENTIONS:- Educate patient/family on patient safety including physical limitations- Instruct patient to call for assistance with activity - Consult OT/PT to assist with strengthening/mobility - Keep Call bell within reach- Keep bed low and locked with side rails adjusted as appropriate- Keep care items and personal belongings within reach- Initiate and maintain comfort rounds.  Outcome: Completed  Goal: Maintain or return to baseline ADL function  Description: INTERVENTIONS:-  Assess patient's  ability to carry out ADLs; assess patient's baseline for ADL function and identify physical deficits which impact ability to perform ADLs (bathing, care of mouth/teeth, toileting, grooming, dressing, etc.)- Assess/evaluate cause of self-care deficits - Assess range of motion- Assess patient's mobility; develop plan if impaired- Assess patient's need for assistive devices and provide as appropriate- Encourage maximum independence but intervene and supervise when necessary- Involve family in performance of ADLs- Assess for home care needs following discharge - Consider OT consult to assist with ADL evaluation and planning for discharge- Provide patient education as appropriate  Outcome: Completed  Goal: Maintains/Returns to pre admission functional level  Description: INTERVENTIONS:- Perform AM-PAC 6 Click Basic Mobility/ Daily Activity assessment daily.- Set and communicate daily mobility goal to care team and patient/family/caregiver. - Collaborate with rehabilitation services on mobility goals if consulted.  Outcome: Completed     Problem: Knowledge Deficit  Goal: Patient/family/caregiver demonstrates understanding of disease process, treatment plan, medications, and discharge instructions  Description: Complete learning assessment and assess knowledge base.Interventions:- Provide teaching at level of understanding- Provide teaching via preferred learning methods  Outcome: Completed     Problem: DISCHARGE PLANNING  Goal: Discharge to home or other facility with appropriate resources  Description: INTERVENTIONS:- Identify barriers to discharge w/patient and caregiver- Arrange for needed discharge resources and transportation as appropriate- Identify discharge learning needs (meds, wound care, etc.)- Arrange for interpretive services to assist at discharge as needed- Refer to Case Management Department for coordinating discharge planning if the patient needs post-hospital services based on physician/advanced  practitioner order or complex needs related to functional status, cognitive ability, or social support system  Outcome: Completed     Problem: POSTPARTUM  Goal: Experiences normal postpartum course  Description: INTERVENTIONS:- Monitor maternal vital signs- Assess uterine involution and lochia  Outcome: Completed  Goal: Appropriate maternal -  bonding  Description: INTERVENTIONS:- Identify family support- Assess for appropriate maternal/infant bonding -Encourage maternal/infant bonding opportunities- Referral to  or  as needed  Outcome: Completed  Goal: Establishment of infant feeding pattern  Description: INTERVENTIONS:- Assess breast/bottle feeding- Refer to lactation as needed  Outcome: Completed  Goal: Incision(s), wounds(s) or drain site(s) healing without S/S of infection  Description: INTERVENTIONS- Assess and document dressing, incision, wound bed, drain sites and surrounding tissue- Provide patient and family education.  Outcome: Completed

## 2025-05-09 ENCOUNTER — NURSE TRIAGE (OUTPATIENT)
Age: 35
End: 2025-05-09

## 2025-05-09 NOTE — TELEPHONE ENCOUNTER
"  FOLLOW UP: [facility] on Call Dr. GERA Morrell-Ibuprofen and cool compresses, avoid excessive massage but continue hand expression, direct feeding or pumping. Avoid hyperlactation on the engorged side but continue to empty. Cool compress will decrease the sarah-duct inflammation and allow for drainage.  If no improvement in 24 hrs call back to consider abx, but most likely will not be needed with these interventions.     8;33 left Vmm back to patient to call office for follow up instructions and sent [facility] msg with provider recommendations.   REASON FOR CONVERSATION: Postpartum Complications    SYMPTOMS: POD # 3 C/S with chills x3 and c/o incisional tenderness and breast engorgement. Incision is clean, dry, intact. No areas of redness. Right breast engorged with few lumps, she hand expressed and got 1.5 colostrum out. Patient asked for appointment today, no office appointment available to schedule.     OTHER: no HA, no n/v/d, bleeding minimal, eating and drinking well, no other concerns.     DISPOSITION: discuss with on call   Answer Assessment - Initial Assessment Questions  1. LEVEL: \"What is the most recent temperature?\"       Did not take take temp  2. MEASUREMENT: \"How was it measured?\"       No temp  3. ONSET: \"When did the fever start?\"       Middle of night   4. CHILLS: \"Do you have chills?\" If yes: \"How bad are they?\"  (e.g., none, mild, moderate, severe)       Had during night and, 1 x this AM   5. OTHER SYMPTOMS: \"Do you have any other symptoms besides the fever?\"  (e.g., abdomen pain, breast pain, cough, diarrhea, urination pain, vaginal discharge)      Breasts are tender and warm, few lumps in right breasts, baby is feeding off left only. Right breast feels engorged.   6. DELIVERY DATE: \"When was your delivery date?\" \"Vaginal delivery or ?\"       25  8.  INCISION SITE: \"If you had a , what does the incision site look like?\" (e.g., red, drainage, N/A)      Clean, dry. Intact, no " "redness, no discharge, little painful when she touches right side of incision    9. CAUSE: If there are no symptoms, ask: \"What do you think is causing the fever?\"       Unsure   10. CONTACTS: \"Does anyone else in the family have an infection?\"        no  11. TREATMENT: \"What have you done so far to treat this fever?\" (e.g., medications)        She tried to hand express, got 1.5 oz out and tried warm compress to try to soften and it doesn't feel as engorged   12. BREASTFEEDING: \"Are you breastfeeding?\"        yes  13. TRAVEL: \"Have you traveled out of the country in the last month?\" (e.g., travel history, exposures)       no    Protocols used: Postpartum - Fever-Adult-[place]    "

## 2025-05-09 NOTE — TELEPHONE ENCOUNTER
Pt calling in, pt missed call, pt was notified of the on call providers recommendations, pt verbalized understanding and was notified when to call back, pt verbalized understanding.

## 2025-05-09 NOTE — UTILIZATION REVIEW
"NOTIFICATION OF INPATIENT ADMISSION   MATERNITY/DELIVERY AUTHORIZATION REQUEST   SERVICING FACILITY:   [facility]  Parent Child Health - L&D, [place], NICU  [address] [place] [address]  Tax ID: [id number]  NPI: [id number] ATTENDING PROVIDER:  Attending Name and NPI#: Karmen Kern Md [[id number]]  Address: [address] [place] [address]  Phone: [phone]     ADMISSION INFORMATION:  Place of Service: Inpatient [facility]  Place of Service Code: 21  Inpatient Admission Date/Time: 25  6:08 AM  Discharge Date/Time: 2025  1:00 PM  Admitting Diagnosis Code/Description:  Delivery with history of  [O34.219]   Mother: Wander Barker 1990 Estimated Date of Delivery: 25  Delivering clinician:     OB History          2    Para   2    Term   2            AB        Living   2         SAB        IAB        Ectopic        Multiple   0    Live Births   2                Name & MRN:   Information for the patient's :  Lucero, Baby Boy (Wander) [[id number]]   [place] Delivery Information:  Sex: male  Delivered 2025 8:37 AM by , Low Transverse; Gestational Age: 39w2d     Measurements:  Weight: 7 lb 15.3 oz (3610 g);  Height: 20.5\"    APGAR 1 minute 5 minutes 10 minutes   Totals: 8 9       UTILIZATION REVIEW CONTACT:  Adriana Perkins, Utilization   Network Utilization Review Department  Phone: [phone]  Fax [phone]  Email: [email or web address]  Contact for approvals/pending authorizations, clinical reviews, and discharge.   PHYSICIAN ADVISORY SERVICES:  Medical Necessity Denial & Keue-yf-Nfom Review  Phone: [phone]  Fax: [phone]  Email: [email or web address]   DISCHARGE SUPPORT TEAM:  For Patients Discharge Needs & Updates  Phone: [phone] opt. 2 Fax: [phone]  Email: [email or web address]      "

## 2025-05-12 ENCOUNTER — TELEPHONE (OUTPATIENT)
Dept: OBGYN CLINIC | Facility: MEDICAL CENTER | Age: 35
End: 2025-05-12

## 2025-05-12 NOTE — TELEPHONE ENCOUNTER
POSTPARTUM PHONE CALL ASSESSMENT    Date of Delivery: 5/6/25  Delivering Provider: Dr. Sunshine  Mode: RLTCS  Delivery Notes/Complications: None.     Do you still have bleeding/pain? Patient reports bleeding is minimal and pain is well controlled. Taking Motrin PRN. Reports no issue with incision site. Has incision check with provider tomorrow.   Regular BMs/Urination? Yes.  Breastfeeding/Formula/Both? Breastfeeding/pumping. Has appointment scheduled with lactation tomorrow though baby's pediatrician.   How are you doing emotionally? Patient doing well has good support at home.  Do you have any other questions or concerns for us or your provider? No.  Have you scheduled the pediatrician appointment with pediatrician? Yes. Victoriano Lopez is doing great!    Do you have a postpartum visit scheduled? Yes.    Date scheduled: 6/4 Provider: Dr. Sunshine

## 2025-05-13 ENCOUNTER — OFFICE VISIT (OUTPATIENT)
Dept: OBGYN CLINIC | Facility: CLINIC | Age: 35
End: 2025-05-13

## 2025-05-13 VITALS
SYSTOLIC BLOOD PRESSURE: 126 MMHG | WEIGHT: 169 LBS | DIASTOLIC BLOOD PRESSURE: 78 MMHG | HEIGHT: 65 IN | BODY MASS INDEX: 28.16 KG/M2

## 2025-05-13 DIAGNOSIS — Z98.891 STATUS POST PRIMARY LOW TRANSVERSE CESAREAN SECTION: Primary | ICD-10-CM

## 2025-05-13 PROCEDURE — 99024 POSTOP FOLLOW-UP VISIT: CPT | Performed by: OBSTETRICS & GYNECOLOGY

## 2025-05-13 NOTE — ASSESSMENT & PLAN NOTE
1 week incision check   Doing well    Keep  scheduled  postpartum check   Able to  drive   Recovering well

## 2025-05-13 NOTE — PROGRESS NOTES
"Name: Wander Barker      : 1990      MRN: [id number]  Encounter Provider: Karmen Sunshine MD  Encounter Date: 2025   Encounter department: [facility]  :  Assessment & Plan  Status post primary low transverse  section       1 week incision check   Doing well    Keep  scheduled  postpartum check   Able to  drive   Recovering well       History of Present Illness   HPI  Wander Barker is a 34 y.o. female who presents for 1  week incision  check   States has  occasional pain but  overall  well controlled   Nursing and pumping  - states producing  well - has appt with  lactation  consultant tomorrow  History obtained from: patient    Review of Systems   Constitutional: Negative.    HENT: Negative.     Respiratory: Negative.     Cardiovascular: Negative.    Gastrointestinal: Negative.    Musculoskeletal: Negative.    Neurological: Negative.    Psychiatric/Behavioral: Negative.          Objective   /78   Ht 5' 5\" (1.651 m)   Wt 76.7 kg (169 lb)   LMP 2024 (Exact Date)   Breastfeeding Yes   BMI 28.12 kg/m²      Physical Exam  Vitals reviewed.   Constitutional:       Appearance: Normal appearance.   HENT:      Head: Normocephalic and atraumatic.      Nose: Nose normal.   Eyes:      Conjunctiva/sclera: Conjunctivae normal.   Pulmonary:      Effort: Pulmonary effort is normal.   Abdominal:      Palpations: Abdomen is soft.      Comments: Incision clean  close and intact   No  sign  of  infection   Neurological:      General: No focal deficit present.      Mental Status: She is alert and oriented to person, place, and time.   Psychiatric:         Mood and Affect: Mood normal.         Behavior: Behavior normal.       "

## 2025-05-14 LAB — PLACENTA IN STORAGE: NORMAL

## 2025-06-04 ENCOUNTER — POSTPARTUM VISIT (OUTPATIENT)
Dept: OBGYN CLINIC | Facility: MEDICAL CENTER | Age: 35
End: 2025-06-04

## 2025-06-04 VITALS
BODY MASS INDEX: 26.66 KG/M2 | HEIGHT: 65 IN | SYSTOLIC BLOOD PRESSURE: 118 MMHG | DIASTOLIC BLOOD PRESSURE: 68 MMHG | WEIGHT: 160 LBS

## 2025-06-04 DIAGNOSIS — Z30.09 FAMILY PLANNING INITIATION: Primary | ICD-10-CM

## 2025-06-04 DIAGNOSIS — Z98.891 STATUS POST PRIMARY LOW TRANSVERSE CESAREAN SECTION: ICD-10-CM

## 2025-06-04 PROCEDURE — 99024 POSTOP FOLLOW-UP VISIT: CPT | Performed by: OBSTETRICS & GYNECOLOGY

## 2025-06-04 RX ORDER — ACETAMINOPHEN AND CODEINE PHOSPHATE 120; 12 MG/5ML; MG/5ML
1 SOLUTION ORAL DAILY
Qty: 84 TABLET | Refills: 3 | Status: SHIPPED | OUTPATIENT
Start: 2025-06-04

## 2025-06-04 NOTE — PROGRESS NOTES
"Name: Wander Barker      : 1990      MRN: [id number]  Encounter Provider: Karmen Sunshine MD  Encounter Date: 2025   Enc[facility]: [facility]  :  Assessment & Plan  Family planning initiation    Orders:  •  norethindrone (Enedina) 0.35 MG tablet; Take 1 tablet (0.35 mg total) by mouth daily    Status post primary low transverse  section         Doing well   Has support at  home   Pumping     History of Present Illness   HPI  Wander Barker is a 34 y.o. female who presents for  postpartum visit  / no complains   History obtained from: patient    Review of Systems   All other systems reviewed and are negative.       Objective   /68   Ht 5' 5\" (1.651 m)   Wt 72.6 kg (160 lb)   LMP 2024 (Exact Date)   Breastfeeding Unknown   BMI 26.63 kg/m²      Physical Exam  Vitals reviewed.   Constitutional:       Appearance: Normal appearance.   HENT:      Head: Normocephalic and atraumatic.      Nose: Nose normal.     Eyes:      Conjunctiva/sclera: Conjunctivae normal.     Pulmonary:      Effort: Pulmonary effort is normal.   Abdominal:      Palpations: Abdomen is soft.      Comments: Incision clean close and  intact       Neurological:      General: No focal deficit present.      Mental Status: She is alert and oriented to person, place, and time.     Psychiatric:         Mood and Affect: Mood normal.         Behavior: Behavior normal.       "

## (undated) DEVICE — SUT VICRYL 0 CT 36 IN J958H

## (undated) DEVICE — SWABSTCK, BENZOIN TINCTURE, 1/PK, STRL: Brand: APLICARE

## (undated) DEVICE — ABG MICROSTICKS SAFETY

## (undated) DEVICE — SUT VICRYL 2-0 CT-1 36 IN J945H

## (undated) DEVICE — SUT CHROMIC 0 CT-1 27 IN 812H

## (undated) DEVICE — CHLORAPREP HI-LITE 26ML ORANGE

## (undated) DEVICE — SUT VICRYL 0 CT-1 36 IN J946H

## (undated) DEVICE — SUT VICRYL 0 CTX 36 IN J978H

## (undated) DEVICE — 3M™ STERI-STRIP™ REINFORCED ADHESIVE SKIN CLOSURES, R1547, 1/2 IN X 4 IN (12 MM X 100 MM), 6 STRIPS/ENVELOPE: Brand: 3M™ STERI-STRIP™

## (undated) DEVICE — ADHESIVE SKN CLSR HISTOACRYL FLEX 0.5ML LF

## (undated) DEVICE — GLOVE INDICATOR PI UNDERGLOVE SZ 7.5 BLUE

## (undated) DEVICE — KIT,BETHLEHEM C SECT DELIVERY: Brand: CARDINAL HEALTH

## (undated) DEVICE — PACK C-SECTION PBDS

## (undated) DEVICE — SUT PLAIN 3-0 CT-1 27 IN 842H

## (undated) DEVICE — GLOVE SRG BIOGEL ECLIPSE 6

## (undated) DEVICE — GLOVE SRG BIOGEL ECLIPSE 7

## (undated) DEVICE — SUT MONOCRYL 4-0 PS-2 27 IN Y426H

## (undated) DEVICE — GLOVE INDICATOR UNDERGLOVE SZ 6 BLUE